# Patient Record
Sex: MALE | Race: WHITE | NOT HISPANIC OR LATINO | Employment: UNEMPLOYED | URBAN - METROPOLITAN AREA
[De-identification: names, ages, dates, MRNs, and addresses within clinical notes are randomized per-mention and may not be internally consistent; named-entity substitution may affect disease eponyms.]

---

## 2017-01-09 ENCOUNTER — HOSPITAL ENCOUNTER (EMERGENCY)
Facility: OTHER | Age: 52
Discharge: HOME | End: 2017-01-09
Attending: EMERGENCY MEDICINE
Payer: MEDICAID

## 2017-01-09 VITALS
TEMPERATURE: 98 F | OXYGEN SATURATION: 98 % | BODY MASS INDEX: 31.08 KG/M2 | SYSTOLIC BLOOD PRESSURE: 118 MMHG | RESPIRATION RATE: 18 BRPM | DIASTOLIC BLOOD PRESSURE: 62 MMHG | WEIGHT: 250 LBS | HEART RATE: 102 BPM | HEIGHT: 75 IN

## 2017-01-09 DIAGNOSIS — M54.50 ACUTE BILATERAL LOW BACK PAIN WITHOUT SCIATICA: Primary | ICD-10-CM

## 2017-01-09 PROCEDURE — 63600175 PHARM REV CODE 636 W HCPCS: Performed by: EMERGENCY MEDICINE

## 2017-01-09 PROCEDURE — 99283 EMERGENCY DEPT VISIT LOW MDM: CPT | Mod: 25

## 2017-01-09 PROCEDURE — 96372 THER/PROPH/DIAG INJ SC/IM: CPT

## 2017-01-09 RX ORDER — DEXAMETHASONE SODIUM PHOSPHATE 4 MG/ML
4 INJECTION, SOLUTION INTRA-ARTICULAR; INTRALESIONAL; INTRAMUSCULAR; INTRAVENOUS; SOFT TISSUE
Status: COMPLETED | OUTPATIENT
Start: 2017-01-09 | End: 2017-01-09

## 2017-01-09 RX ORDER — KETOROLAC TROMETHAMINE 30 MG/ML
60 INJECTION, SOLUTION INTRAMUSCULAR; INTRAVENOUS
Status: COMPLETED | OUTPATIENT
Start: 2017-01-09 | End: 2017-01-09

## 2017-01-09 RX ADMIN — KETOROLAC TROMETHAMINE 60 MG: 30 INJECTION, SOLUTION INTRAMUSCULAR at 10:01

## 2017-01-09 RX ADMIN — DEXAMETHASONE SODIUM PHOSPHATE 4 MG: 4 INJECTION, SOLUTION INTRAMUSCULAR; INTRAVENOUS at 10:01

## 2017-01-09 NOTE — ED AVS SNAPSHOT
[facility]  [address]  [facility] [address]               Yair Granger   2017 10:29 PM   ED    Description:  Male : 1965   Department:  [facility]           Your Care was Coordinated By:     Provider Role From To    Ofe Pat MD Attending Provider 17 [phone] --      Reason for Visit     Back Pain           Diagnoses this Visit        Comments    Acute bilateral low back pain without sciatica    -  Primary       ED Disposition     None           To Do List           Follow-up Information     Schedule an appointment as soon as possible for a visit with [facility].    Why:  As needed    Contact information:    [address] [facility] [address]  [phone]        [facility]     [facility] Care [facility] -  Assistance  Registered nurses in the [facility] provide clinical advisement, health education, appointment booking, and other advisory services.  Call for this free service at [phone].             Medications           Message regarding Medications     Verify the changes and/or additions to your medication regime listed below are the same as discussed with your clinician today.  If any of these changes or additions are incorrect, please notify your healthcare provider.        These medications were administered today        Dose Freq    dexamethasone injection 4 mg 4 mg ED 1 Time    Sig: Inject 1 mL (4 mg total) into the muscle ED 1 Time.    Class: Normal    Route: Intramuscular    ketorolac injection 60 mg 60 mg ED 1 Time    Sig: Inject 60 mg into the muscle ED 1 Time.    Class: Normal    Route: Intramuscular      STOP taking these medications     albuterol 90 mcg/actuation inhaler Inhale 1-2 puffs into the lungs every 6 (six) hours as needed for Wheezing (or cough).    hydrOXYzine HCl (ATARAX) 10 MG Tab Take 1 tablet (10 mg total) by mouth 4 (four) times daily  "as needed for Itching.           Verify that the below list of medications is an accurate representation of the medications you are currently taking.  If none reported, the list may be blank. If incorrect, please contact your healthcare provider. Carry this list with you in case of emergency.           Current Medications            Clinical Reference Information           Your Vitals Were     BP Pulse Temp Resp Height Weight    122/65 (BP Location: Left arm, Patient Position: Sitting) 117 98.3 °F (36.8 °C) (Oral) 18 6' 3" (1.905 m) 113.4 kg (250 lb)    SpO2 BMI             97% 31.25 kg/m2         Allergies as of 1/9/2017     No Known Allergies      Immunizations Administered on Date of Encounter - 1/9/2017     None      ED Micro, Lab, POCT     None      ED Imaging Orders     None        Discharge Instructions         Back Pain (Acute or Chronic)    Back pain is one of the most common problems. The good news is that most people feel better in 1 to 2 weeks, and most of the rest in 1 to 2 months. Most people can remain active.  People experience and describe pain differently; not everyone is the same.  · The pain can be sharp, stabbing, shooting, aching, cramping or burning.  · Movement, standing, bending, lifting, sitting, or walking may worsen pain.  · It can be localized to one spot or area, or it can be more generalized.  · It can spread or radiate upwards, to the front, or go down your arms or legs (sciatica).  · It can cause muscle spasm.  Most of the time, mechanical problems with the muscles or spine cause the pain. Mechanical problems are usually caused by an injury to the muscles or ligaments. While illness can cause back pain, it is usually not caused by a serious illness. Mechanical problems include:   · Physical activity such as sports, exercise, work, or normal activity  · Overexertion, lifting, pushing, pulling incorrectly or too aggressively  · Sudden twisting, bending, or stretching from an accident, " or accidental movement  · Poor posture  · Stretching or moving wrong, without noticing pain at the time  · Poor coordination, lack of regular exercise (check with your doctor about this)  · Spinal disc disease or arthritis  · Stress  Pain can also be related to pregnancy, or illness like appendicitis, bladder or kidney infections, pelvic infections, and many other things.  Acute back pain usually gets better in 1 to 2 weeks. Back pain related to disk disease, arthritis in the spinal joints or spinal stenosis (narrowing of the spinal canal) can become chronic and last for months or years.  Unless you had a physical injury (for example, a car accident or fall) X-rays are usually not needed for the initial evaluation of back pain. If pain continues and does not respond to medical treatment, X-rays and other tests may be needed.  Home care  Try these home care recommendations:  · When in bed, try to find a position of comfort. A firm mattress is best. Try lying flat on your back with pillows under your knees. You can also try lying on your side with your knees bent up towards your chest and a pillow between your knees.  · At first, do not try to stretch out the sore spots. If there is a strain, it is not like the good soreness you get after exercising without an injury. In this case, stretching may make it worse.  · Avoid prolong sitting, long car rides, or travel. This puts more stress on the lower back than standing or walking.  · During the first 24 to 72 hours after an acute injury or flare up of chronic back pain, apply an ice pack to the painful area for 20 minutes and then remove it for 20 minutes. Do this over a period of 60 to 90 minutes or several times a day. This will reduce swelling and pain. Wrap the ice pack in a thin towel or plastic to protect your skin.  · You can start with ice, then switch to heat. Heat (hot shower, hot bath, or heating pad) reduces pain and works well for muscle spasms. Heat can be  applied to the painful area for 20 minutes then remove it for 20 minutes. Do this over a period of 60 to 90 minutes or several times a day. Do not sleep on a heating pad. It can lead to skin burns or tissue damage.  · You can alternate ice and heat therapy. Talk with your doctor about the best treatment for your back pain.  · Therapeutic massage can help relax the back muscles without stretching them.  · Be aware of safe lifting methods and do not lift anything without stretching first.  Medicines  Talk to your doctor before using medicine, especially if you have other medical problems or are taking other medicines.  · You may use over-the-counter medicine as directed on the bottle to control pain, unless another pain medicine was prescribed. If you have chronic conditions like diabetes, liver or kidney disease, stomach ulcers, or gastrointestinal bleeding, or are taking blood thinners, talk to your doctor before taking any medicine.  · Be careful if you are given a prescription medicines, narcotics, or medicine for muscle spasms. They can cause drowsiness, affect your coordination, reflexes, and judgement. Do not drive or operate heavy machinery.  Follow-up care  Follow up with your healthcare provider, or as advised.   A radiologist will review any X-rays that were taken. Your provide will notify you of any new findings that may affect your care.  Call 911  Call emergency services if any of the following occur:  · Trouble breathing  · Confusion  · Very drowsy or trouble awakening  · Fainting or loss of consciousness  · Rapid or very slow heart rate  · Loss of bowel or bladder control  When to seek medical advice  Call your healthcare provider right away if any of these occur:   · Pain becomes worse or spreads to your legs  · Weakness or numbness in one or both legs  · Numbness in the groin or genital area  © 8913-6231 [employer] [address] [place] [address], [place] [address]. All rights reserved. This  information is not intended as a substitute for professional medical care. Always follow your healthcare professional's instructions.          [facility] Sign-Up     Activating your [facility] account is as easy as 1-2-3!     1) Visit [email or web address], select Sign Up Now, enter this activation code and your date of birth, then select Next.  [email or web address]  Expires: 1/28/2017  2:15 PM      2) Create a username and password to use when you visit [facility] in the future and select a security question in case you lose your password and select Next.    3) Enter your e-mail address and click Sign Up!    Additional Information  If you have questions, please e-mail [email or web address] or call [phone] to talk to our [facility] staff. Remember, [facility] is NOT to be used for urgent needs. For medical emergencies, dial 911.         Smoking Cessation     If you would like to quit smoking:   You may be eligible for free services if you are a [place] resident and started smoking cigarettes before September 1, 1988.  Call the Smoking Cessation Trust (SCT) toll free at (305) 916-3610 or (782) 978-1412.   Call -182-QUIT-NOW if you do not meet the above criteria.             [facility] complies with applicable Federal civil rights laws and does not discriminate on the basis of race, color, national origin, age, disability, or sex.        Language Assistance Services     ATTENTION: Language assistance services are available, free of charge. Please call [phone].      ATENCIÓN: Si habla español, tiene a larsen disposición servicios gratuitos de asistencia lingüística. Llame al [phone].     CHÚ Ý: N?u b?n nói Ti?ng Vi?t, có các d?ch v? h? tr? ngôn ng? mi?n phí dành cho b?n. G?i s? [phone].

## 2017-01-10 ENCOUNTER — HOSPITAL ENCOUNTER (EMERGENCY)
Facility: OTHER | Age: 52
Discharge: HOME | End: 2017-01-10
Attending: EMERGENCY MEDICINE
Payer: MEDICAID

## 2017-01-10 VITALS
HEART RATE: 87 BPM | BODY MASS INDEX: 34.35 KG/M2 | TEMPERATURE: 98 F | RESPIRATION RATE: 18 BRPM | DIASTOLIC BLOOD PRESSURE: 84 MMHG | OXYGEN SATURATION: 95 % | SYSTOLIC BLOOD PRESSURE: 129 MMHG | HEIGHT: 75 IN | WEIGHT: 276.25 LBS

## 2017-01-10 DIAGNOSIS — E86.0 MILD DEHYDRATION: Primary | ICD-10-CM

## 2017-01-10 DIAGNOSIS — R41.3 MEMORY LOSS: ICD-10-CM

## 2017-01-10 LAB
ALBUMIN SERPL BCP-MCNC: 3.3 G/DL
ALP SERPL-CCNC: 107 U/L
ALT SERPL W/O P-5'-P-CCNC: 15 U/L
AMMONIA PLAS-SCNC: 24 UMOL/L
ANION GAP SERPL CALC-SCNC: 12 MMOL/L
AST SERPL-CCNC: 11 U/L
BASOPHILS # BLD AUTO: 0.06 K/UL
BASOPHILS NFR BLD: 0.5 %
BILIRUB SERPL-MCNC: 0.7 MG/DL
BUN SERPL-MCNC: 23 MG/DL
CALCIUM SERPL-MCNC: 9.3 MG/DL
CHLORIDE SERPL-SCNC: 110 MMOL/L
CO2 SERPL-SCNC: 21 MMOL/L
CREAT SERPL-MCNC: 1.1 MG/DL
CRP SERPL-MCNC: 12.4 MG/L
DIFFERENTIAL METHOD: ABNORMAL
EOSINOPHIL # BLD AUTO: 0.3 K/UL
EOSINOPHIL NFR BLD: 2 %
ERYTHROCYTE [DISTWIDTH] IN BLOOD BY AUTOMATED COUNT: 13.3 %
ERYTHROCYTE [SEDIMENTATION RATE] IN BLOOD BY WESTERGREN METHOD: 27 MM/HR
EST. GFR  (AFRICAN AMERICAN): >60 ML/MIN/1.73 M^2
EST. GFR  (NON AFRICAN AMERICAN): >60 ML/MIN/1.73 M^2
ETHANOL SERPL-MCNC: <10 MG/DL
GLUCOSE SERPL-MCNC: 137 MG/DL
HCT VFR BLD AUTO: 42.9 %
HGB BLD-MCNC: 14.3 G/DL
HIV1+2 IGG SERPL QL IA.RAPID: NEGATIVE
LYMPHOCYTES # BLD AUTO: 2.3 K/UL
LYMPHOCYTES NFR BLD: 17.2 %
MAGNESIUM SERPL-MCNC: 2.3 MG/DL
MCH RBC QN AUTO: 33 PG
MCHC RBC AUTO-ENTMCNC: 33.3 %
MCV RBC AUTO: 99 FL
MONOCYTES # BLD AUTO: 1.2 K/UL
MONOCYTES NFR BLD: 8.7 %
NEUTROPHILS # BLD AUTO: 9.4 K/UL
NEUTROPHILS NFR BLD: 71.3 %
PLATELET # BLD AUTO: 176 K/UL
PMV BLD AUTO: 12 FL
POTASSIUM SERPL-SCNC: 3.9 MMOL/L
PROT SERPL-MCNC: 6.6 G/DL
RBC # BLD AUTO: 4.33 M/UL
SODIUM SERPL-SCNC: 143 MMOL/L
WBC # BLD AUTO: 13.2 K/UL

## 2017-01-10 PROCEDURE — 82140 ASSAY OF AMMONIA: CPT

## 2017-01-10 PROCEDURE — 99284 EMERGENCY DEPT VISIT MOD MDM: CPT

## 2017-01-10 PROCEDURE — 85025 COMPLETE CBC W/AUTO DIFF WBC: CPT

## 2017-01-10 PROCEDURE — 86140 C-REACTIVE PROTEIN: CPT

## 2017-01-10 PROCEDURE — 80053 COMPREHEN METABOLIC PANEL: CPT

## 2017-01-10 PROCEDURE — 80320 DRUG SCREEN QUANTALCOHOLS: CPT

## 2017-01-10 PROCEDURE — 85651 RBC SED RATE NONAUTOMATED: CPT

## 2017-01-10 PROCEDURE — 83735 ASSAY OF MAGNESIUM: CPT

## 2017-01-10 PROCEDURE — 86701 HIV-1ANTIBODY: CPT

## 2017-01-10 NOTE — ED AVS SNAPSHOT
"          [facility]  [address]  [facility] [address]               Yair Granger   1/10/2017  8:06 PM   ED    Description:  Male : 1965   Department:  [facility]           Your Care was Coordinated By:     Provider Role From To    Joceline Johnson MD Attending Provider 01/10/17 2011 --      Reason for Visit     Memory Loss           Diagnoses this Visit        Comments    Mild dehydration    -  Primary     Memory loss           ED Disposition     None           To Do List           Follow-up Information     Follow up with [facility].    Why:  in 2-3 days    Contact information:    [address] [facility] [address]  [phone]        [facility]     [facility] -  Assistance  Registered nurses in the [facility] provide clinical advisement, health education, appointment booking, and other advisory services.  Call for this free service at [phone].             Medications           Message regarding Medications     Verify the changes and/or additions to your medication regime listed below are the same as discussed with your clinician today.  If any of these changes or additions are incorrect, please notify your healthcare provider.             Verify that the below list of medications is an accurate representation of the medications you are currently taking.  If none reported, the list may be blank. If incorrect, please contact your healthcare provider. Carry this list with you in case of emergency.           Current Medications            Clinical Reference Information           Your Vitals Were     BP Pulse Temp Resp Height Weight    118/68 (BP Location: Left arm, Patient Position: Sitting) 95 98.3 °F (36.8 °C) (Oral) 22 6' 3" (1.905 m) 125.3 kg (276 lb 3.8 oz)    SpO2 BMI             97% 34.53 kg/m2         Allergies as of 1/10/2017     No Known Allergies    "   Immunizations Administered on Date of Encounter - 1/10/2017     None      ED Micro, Lab, POCT     Start Ordered       Status Ordering Provider    01/10/17 2151 01/10/17 2150  TSH  Add-on      Completed     01/10/17 2046 01/10/17 2046  Rapid HIV  STAT      Final result     01/10/17 2046 01/10/17 2046  C-reactive protein  STAT      Final result     01/10/17 2046 01/10/17 2046  Sedimentation rate, manual  STAT      In process     01/10/17 2046 01/10/17 2046  Magnesium  STAT      Final result     01/10/17 2046 01/10/17 2046  CBC auto differential  STAT      Final result     01/10/17 2046 01/10/17 2046  Comprehensive metabolic panel  STAT      Final result     01/10/17 2045 01/10/17 2046  Ethanol  STAT      Final result     01/10/17 2045 01/10/17 2046  Ammonia  STAT      Final result       ED Imaging Orders     Start Ordered       Status Ordering Provider    01/10/17 2046 01/10/17 2046  CT Head Without Contrast  1 time imaging      Final result       Discharge References/Attachments     DEHYDRATION (ADULT) (ENGLISH)    CONFUSION (ENGLISH)      [facility] Sign-Up     Activating your [facility] account is as easy as 1-2-3!     1) Visit [email or web address], select Sign Up Now, enter this activation code and your date of birth, then select Next.  [email or web address]  Expires: 1/28/2017  2:15 PM      2) Create a username and password to use when you visit [facility] in the future and select a security question in case you lose your password and select Next.    3) Enter your e-mail address and click Sign Up!    Additional Information  If you have questions, please e-mail [email or web address] or call [phone] to talk to our [facility] staff. Remember, [facility] is NOT to be used for urgent needs. For medical emergencies, dial 911.         Smoking Cessation     If you would like to quit smoking:   You may be eligible for free services if you are a [place] resident and started smoking cigarettes before September 1, 1988.  Call the  Smoking Cessation Trust ([facility]) toll free at (755) 558-7653 or (645) 561-2348.   Call 1-800-QUIT-NOW if you do not meet the above criteria.             [facility] complies with applicable Federal civil rights laws and does not discriminate on the basis of race, color, national origin, age, disability, or sex.        Language Assistance Services     ATTENTION: Language assistance services are available, free of charge. Please call [phone].      ATENCIÓN: Si habla maribel, tiene a larsen disposición servicios gratuitos de asistencia lingüística. Llame al [phone].     ARNULFO Ý: N?u b?n nói Ti?ng Vi?t, có các d?ch v? h? tr? ngôn ng? mi?n phí dành cho b?n. G?i s? [phone].

## 2017-01-10 NOTE — DISCHARGE INSTRUCTIONS
Back Pain (Acute or Chronic)    Back pain is one of the most common problems. The good news is that most people feel better in 1 to 2 weeks, and most of the rest in 1 to 2 months. Most people can remain active.  People experience and describe pain differently; not everyone is the same.  · The pain can be sharp, stabbing, shooting, aching, cramping or burning.  · Movement, standing, bending, lifting, sitting, or walking may worsen pain.  · It can be localized to one spot or area, or it can be more generalized.  · It can spread or radiate upwards, to the front, or go down your arms or legs (sciatica).  · It can cause muscle spasm.  Most of the time, mechanical problems with the muscles or spine cause the pain. Mechanical problems are usually caused by an injury to the muscles or ligaments. While illness can cause back pain, it is usually not caused by a serious illness. Mechanical problems include:   · Physical activity such as sports, exercise, work, or normal activity  · Overexertion, lifting, pushing, pulling incorrectly or too aggressively  · Sudden twisting, bending, or stretching from an accident, or accidental movement  · Poor posture  · Stretching or moving wrong, without noticing pain at the time  · Poor coordination, lack of regular exercise (check with your doctor about this)  · Spinal disc disease or arthritis  · Stress  Pain can also be related to pregnancy, or illness like appendicitis, bladder or kidney infections, pelvic infections, and many other things.  Acute back pain usually gets better in 1 to 2 weeks. Back pain related to disk disease, arthritis in the spinal joints or spinal stenosis (narrowing of the spinal canal) can become chronic and last for months or years.  Unless you had a physical injury (for example, a car accident or fall) X-rays are usually not needed for the initial evaluation of back pain. If pain continues and does not respond to medical treatment, X-rays and other tests may be  needed.  Home care  Try these home care recommendations:  · When in bed, try to find a position of comfort. A firm mattress is best. Try lying flat on your back with pillows under your knees. You can also try lying on your side with your knees bent up towards your chest and a pillow between your knees.  · At first, do not try to stretch out the sore spots. If there is a strain, it is not like the good soreness you get after exercising without an injury. In this case, stretching may make it worse.  · Avoid prolong sitting, long car rides, or travel. This puts more stress on the lower back than standing or walking.  · During the first 24 to 72 hours after an acute injury or flare up of chronic back pain, apply an ice pack to the painful area for 20 minutes and then remove it for 20 minutes. Do this over a period of 60 to 90 minutes or several times a day. This will reduce swelling and pain. Wrap the ice pack in a thin towel or plastic to protect your skin.  · You can start with ice, then switch to heat. Heat (hot shower, hot bath, or heating pad) reduces pain and works well for muscle spasms. Heat can be applied to the painful area for 20 minutes then remove it for 20 minutes. Do this over a period of 60 to 90 minutes or several times a day. Do not sleep on a heating pad. It can lead to skin burns or tissue damage.  · You can alternate ice and heat therapy. Talk with your doctor about the best treatment for your back pain.  · Therapeutic massage can help relax the back muscles without stretching them.  · Be aware of safe lifting methods and do not lift anything without stretching first.  Medicines  Talk to your doctor before using medicine, especially if you have other medical problems or are taking other medicines.  · You may use over-the-counter medicine as directed on the bottle to control pain, unless another pain medicine was prescribed. If you have chronic conditions like diabetes, liver or kidney disease,  stomach ulcers, or gastrointestinal bleeding, or are taking blood thinners, talk to your doctor before taking any medicine.  · Be careful if you are given a prescription medicines, narcotics, or medicine for muscle spasms. They can cause drowsiness, affect your coordination, reflexes, and judgement. Do not drive or operate heavy machinery.  Follow-up care  Follow up with your healthcare provider, or as advised.   A radiologist will review any X-rays that were taken. Your provide will notify you of any new findings that may affect your care.  Call 911  Call emergency services if any of the following occur:  · Trouble breathing  · Confusion  · Very drowsy or trouble awakening  · Fainting or loss of consciousness  · Rapid or very slow heart rate  · Loss of bowel or bladder control  When to seek medical advice  Call your healthcare provider right away if any of these occur:   · Pain becomes worse or spreads to your legs  · Weakness or numbness in one or both legs  · Numbness in the groin or genital area  © 1873-7757 [employer]. [address] [place] [address], [place] [address]. All rights reserved. This information is not intended as a substitute for professional medical care. Always follow your healthcare professional's instructions.

## 2017-01-10 NOTE — ED PROVIDER NOTES
Encounter Date: 1/9/2017    SCRIBE #1 NOTE: I, Trena Lockett , am scribing for, and in the presence of, Dr. Pat.       History     Chief Complaint   Patient presents with    Back Pain     X a few hours, denies trauma     Review of patient's allergies indicates:  No Known Allergies  HPI Comments: Time seen by provider: 10:32 PM    This is a 51 y.o. male who presents with complaint of back pain. Symptoms began today. Onset of symptoms occurred while walking. Bilateral lower back pain is described as constant and moderate. Pt has no other complaints, and denies fever, chills, nausea, vomiting, incontinence, or any urinary symptoms. Pain worsens with movement, and is better with rest. Pt denies taking any OTC medication, recent injury, or heavy lifting. Episode is inconsistent with previous back pain.        The history is provided by the patient.     History reviewed. No pertinent past medical history.  Past Medical History Pertinent Negatives   Diagnosis Date Noted    Diabetes mellitus 12/14/2016    Hypertension 12/14/2016     Past Surgical History   Procedure Laterality Date    None N/A 6/5/2015     Family History   Problem Relation Age of Onset    Cancer Neg Hx     Diabetes Neg Hx     Heart disease Neg Hx     Hypertension Neg Hx     Stroke Neg Hx      Social History   Substance Use Topics    Smoking status: Current Every Day Smoker     Packs/day: 2.00     Types: Cigarettes    Smokeless tobacco: None    Alcohol use No     Review of Systems   Constitutional: Negative for chills and fever.   HENT: Negative for congestion and sore throat.    Eyes: Negative for redness and visual disturbance.   Respiratory: Negative for cough and shortness of breath.    Cardiovascular: Negative for chest pain and palpitations.   Gastrointestinal: Negative for abdominal pain, diarrhea, nausea and vomiting.   Genitourinary: Negative for decreased urine volume, difficulty urinating, dysuria, frequency, hematuria and urgency.         Positive for inconitinence.    Musculoskeletal: Positive for back pain.   Skin: Negative for rash.   Neurological: Negative for weakness and headaches.   Psychiatric/Behavioral: Negative for confusion.       Physical Exam   Initial Vitals   BP Pulse Resp Temp SpO2   01/09/17 2113 01/09/17 2113 01/09/17 2113 01/09/17 2113 01/09/17 2113   122/65 117 18 98.3 °F (36.8 °C) 97 %     Physical Exam    Nursing note and vitals reviewed.  Constitutional: He appears well-developed and well-nourished. He is not diaphoretic. No distress.   Dirty and disheveled.    HENT:   Head: Normocephalic and atraumatic.   Right Ear: External ear normal.   Left Ear: External ear normal.   Eyes: EOM are normal. Pupils are equal, round, and reactive to light. Right eye exhibits no discharge. Left eye exhibits no discharge.   Neck: Normal range of motion.   Cardiovascular: Normal rate, regular rhythm and normal heart sounds. Exam reveals no gallop and no friction rub.    No murmur heard.  Pulmonary/Chest: Breath sounds normal. No respiratory distress. He has no wheezes. He has no rhonchi. He has no rales.   Abdominal: Soft. There is no tenderness. There is no rebound and no guarding.   Musculoskeletal: Normal range of motion. He exhibits tenderness. He exhibits no edema.   Paraspinal tenderness to palpation to the lumbar region bilaterally. Tenderness over the SI joint. No focal midline tenderness to palpation.    Lymphadenopathy:     He has no cervical adenopathy.   Neurological: He is alert and oriented to person, place, and time.   Strength is 5/5 throughout.    Skin: Skin is warm and dry. No rash and no abscess noted. No erythema. No pallor.   Psychiatric: He has a normal mood and affect. His behavior is normal. Judgment and thought content normal.         ED Course   Procedures  Labs Reviewed - No data to display          Medical Decision Making:   History:   Old Medical Records: I decided to obtain old medical records.  Initial  Assessment:   51-year-old male presents complaining of atraumatic bilateral low back pain which started while walking.  He denied any symptoms concerning for cord compression.  On exam he had diffuse lumbar paraspinal tenderness to palpation bilaterally.  ED Management:  Do not feel any imaging was indicated at this time.  He received Toradol and Decadron 4 mg.  The patient was instructed to follow-up with his primary care doctor if the pain persists.            Scribe Attestation:   Scribe #1: I performed the above scribed service and the documentation accurately describes the services I performed. I attest to the accuracy of the note.    Attending Attestation:           Physician Attestation for Scribe:  Physician Attestation Statement for Scribe #1: I, Dr. Pat, reviewed documentation, as scribed by Trena Lockett  in my presence, and it is both accurate and complete.                 ED Course     Clinical Impression:     1. Acute bilateral low back pain without sciatica                Ofe Pat MD  01/10/17 0138

## 2017-01-11 NOTE — ED PROVIDER NOTES
"Encounter Date: 1/10/2017    SCRIBE #1 NOTE: I, James Rendon, am scribing for, and in the presence of, Dr. Johnson.       History     Chief Complaint   Patient presents with    Memory Loss     today, no trauma, doesn't remember what happened today     Review of patient's allergies indicates:  No Known Allergies  HPI Comments: Time seen by provider: 8:31 PM    This is a 51 y.o. male who presents to the ED complaining he has had difficulty with his memory, stating "its all a blur." He complains he cannot remember events from yesterday. He states that he has not been in the hospital within the last week. He reports that he cannot remember the last time he's seen a physician. The patient's hx is limited.     Additional past medical, surgical, and social history as outlined in the nursing assessment was reviewed by me.    The history is provided by the patient. History limited by: lack of cooperativity.     History reviewed. No pertinent past medical history.  No past medical history pertinent negatives.  Past Surgical History   Procedure Laterality Date    None N/A 6/5/2015     Family History   Problem Relation Age of Onset    Cancer Neg Hx     Diabetes Neg Hx     Heart disease Neg Hx     Hypertension Neg Hx     Stroke Neg Hx      Social History   Substance Use Topics    Smoking status: Current Every Day Smoker     Packs/day: 2.00     Types: Cigarettes    Smokeless tobacco: None    Alcohol use No     Review of Systems   Unable to perform ROS: Other (patient will not answer question)       Physical Exam   Initial Vitals   BP Pulse Resp Temp SpO2   01/10/17 1937 01/10/17 1937 01/10/17 1937 01/10/17 1937 01/10/17 1937   118/68 94 22 98.3 °F (36.8 °C) 97 %     Physical Exam    Nursing note and vitals reviewed.  Constitutional: He appears well-developed and well-nourished. He is not diaphoretic. No distress.   HENT:   Head: Normocephalic and atraumatic.   Right Ear: External ear normal.   Left Ear: External ear " normal.   Nose: Nose normal.   Mouth/Throat: Oropharynx is clear and moist.   Eyes: Conjunctivae and EOM are normal. Pupils are equal, round, and reactive to light. Right eye exhibits no discharge. Left eye exhibits no discharge.   Neck: Normal range of motion. Tracheal deviation present.   Cardiovascular: Normal rate, regular rhythm, normal heart sounds and intact distal pulses. Exam reveals no gallop and no friction rub.    No murmur heard.  Pulmonary/Chest: Breath sounds normal. No stridor. No respiratory distress. He has no wheezes. He has no rhonchi. He has no rales.   Abdominal: Soft. There is no tenderness. There is no rebound and no guarding.   Musculoskeletal: Normal range of motion. He exhibits no edema or tenderness.   Neurological: He is alert. He has normal strength. No cranial nerve deficit.   Oriented to person and place. Unwilling to try and answer what year it is. Normal gait.    Skin: Skin is warm and dry. No rash noted. No erythema. No pallor.   Psychiatric: He has a normal mood and affect. His behavior is normal. Judgment and thought content normal.   Pressured speech.          ED Course   Procedures  Labs Reviewed   C-REACTIVE PROTEIN - Abnormal; Notable for the following:        Result Value    CRP 12.4 (*)     All other components within normal limits   SEDIMENTATION RATE, MANUAL - Abnormal; Notable for the following:     Sed Rate 27 (*)     All other components within normal limits   CBC W/ AUTO DIFFERENTIAL - Abnormal; Notable for the following:     WBC 13.20 (*)     RBC 4.33 (*)     MCV 99 (*)     MCH 33.0 (*)     Gran # 9.4 (*)     Mono # 1.2 (*)     Lymph% 17.2 (*)     All other components within normal limits   COMPREHENSIVE METABOLIC PANEL - Abnormal; Notable for the following:     CO2 21 (*)     Glucose 137 (*)     BUN, Bld 23 (*)     Albumin 3.3 (*)     All other components within normal limits   ALCOHOL,MEDICAL (ETHANOL)   AMMONIA   RAPID HIV   MAGNESIUM   TSH              Imaging  "Results         CT Head Without Contrast (Final result) Result time:  01/10/17 21:06:19    Final result by Fortunato Sanchez MD (01/10/17 21:06:19)    Impression:        No acute intracranial abnormalities, stable chronic findings.            Electronically signed by: FORTUNATO SANCHEZ MD  Date:     01/10/17  Time:    21:06     Narrative:    Comparison: 2/28/2016    Clinical history: Memory loss    Technique:    Axial images of the brain were obtained at 5-mm intervals from the skull base to the vertex without the administration of contrast.    Findings:    Prominent choroidal calcifications again noted.  There is no evidence of acute major vascular territory infarct, hemorrhage, or mass.  There is no hydrocephalus.  There are no abnormal extra-axial fluid collections.  The paranasal sinuses and mastoid air cells are clear, and there is no evidence of calvarial fracture.  The visualized soft tissues are unremarkable.              Medical Decision Making:   Initial Assessment:   Patient presents with memory loss. He is oriented to person and place in the ED. He is minimally cooperative with my hx and exam. When I asked him questions he would not respond and would watch the television. When the television was turned off he became very upset and yelled at the medical team stating "don't touch that, keep your hands to yourself. You've made me mad." His ability to focus his attention and mount such a heightened response leave to suspect that his poor cooperativity is less likely due to delirium or an organic cause for his current complaints.  I will CT his head to look for signs of increased intracranial pressure or space occupying lesion. I will check renal function, ammonia, ethanol level and look for signs of infection. He is following commands and has no evidence of delirium at this time. I will reassess.   ED Management:  Patient labs were unremarkable except a minimally elevated ESR/CRP. I felt as though this " elevation was within the normal standard deviation and I did not believe it represented occult bacteremia or serious bacterial infection. Patient ambulated with ease. I was comfortable with his discharge home.             Scribe Attestation:   Scribe #1: I performed the above scribed service and the documentation accurately describes the services I performed. I attest to the accuracy of the note.    Attending Attestation:           Physician Attestation for Scribe:  Physician Attestation Statement for Scribe #1: I, Dr. Johnson, reviewed documentation, as scribed by James Rendon in my presence, and it is both accurate and complete.                 ED Course     Clinical Impression:     1. Mild dehydration    2. Memory loss                Joceline Johnson MD  01/14/17 4164

## 2017-01-13 ENCOUNTER — HOSPITAL ENCOUNTER (EMERGENCY)
Facility: OTHER | Age: 52
Discharge: HOME | End: 2017-01-13
Attending: EMERGENCY MEDICINE
Payer: MEDICAID

## 2017-01-13 VITALS
HEART RATE: 95 BPM | RESPIRATION RATE: 20 BRPM | WEIGHT: 250 LBS | OXYGEN SATURATION: 96 % | DIASTOLIC BLOOD PRESSURE: 88 MMHG | HEIGHT: 75 IN | TEMPERATURE: 98 F | SYSTOLIC BLOOD PRESSURE: 131 MMHG | BODY MASS INDEX: 31.08 KG/M2

## 2017-01-13 DIAGNOSIS — M54.50 ACUTE RIGHT-SIDED LOW BACK PAIN WITHOUT SCIATICA: Primary | ICD-10-CM

## 2017-01-13 DIAGNOSIS — R21 RASH: ICD-10-CM

## 2017-01-13 PROCEDURE — 63600175 PHARM REV CODE 636 W HCPCS: Performed by: EMERGENCY MEDICINE

## 2017-01-13 PROCEDURE — 96372 THER/PROPH/DIAG INJ SC/IM: CPT

## 2017-01-13 PROCEDURE — 25000003 PHARM REV CODE 250: Performed by: EMERGENCY MEDICINE

## 2017-01-13 PROCEDURE — 99283 EMERGENCY DEPT VISIT LOW MDM: CPT | Mod: 25

## 2017-01-13 RX ORDER — ORPHENADRINE CITRATE 30 MG/ML
60 INJECTION INTRAMUSCULAR; INTRAVENOUS
Status: COMPLETED | OUTPATIENT
Start: 2017-01-13 | End: 2017-01-13

## 2017-01-13 RX ORDER — DIPHENHYDRAMINE HCL 25 MG
25 CAPSULE ORAL EVERY 6 HOURS PRN
Qty: 20 CAPSULE | Refills: 0 | COMMUNITY
Start: 2017-01-13

## 2017-01-13 RX ORDER — ORPHENADRINE CITRATE 100 MG/1
100 TABLET, EXTENDED RELEASE ORAL 2 TIMES DAILY
Qty: 14 TABLET | Refills: 0 | Status: SHIPPED | OUTPATIENT
Start: 2017-01-13 | End: 2017-01-20

## 2017-01-13 RX ORDER — DIPHENHYDRAMINE HCL 25 MG
50 CAPSULE ORAL
Status: COMPLETED | OUTPATIENT
Start: 2017-01-13 | End: 2017-01-13

## 2017-01-13 RX ORDER — KETOROLAC TROMETHAMINE 30 MG/ML
60 INJECTION, SOLUTION INTRAMUSCULAR; INTRAVENOUS
Status: COMPLETED | OUTPATIENT
Start: 2017-01-13 | End: 2017-01-13

## 2017-01-13 RX ORDER — IBUPROFEN 600 MG/1
600 TABLET ORAL EVERY 6 HOURS PRN
Qty: 20 TABLET | Refills: 0 | Status: SHIPPED | OUTPATIENT
Start: 2017-01-13

## 2017-01-13 RX ADMIN — DIPHENHYDRAMINE HYDROCHLORIDE 50 MG: 25 CAPSULE ORAL at 10:01

## 2017-01-13 RX ADMIN — KETOROLAC TROMETHAMINE 60 MG: 30 INJECTION, SOLUTION INTRAMUSCULAR at 10:01

## 2017-01-13 RX ADMIN — ORPHENADRINE CITRATE 60 MG: 30 INJECTION INTRAMUSCULAR; INTRAVENOUS at 10:01

## 2017-01-13 NOTE — ED AVS SNAPSHOT
[facility]  [address]  [facility] [address]               Yair Granger   2017  9:34 PM   ED    Description:  Male : 1965   Department:  [facility]           Your Care was Coordinated By:     Provider Role From To    Del Centeno DO Attending Provider [id number] --      Reason for Visit     Back Pain           Diagnoses this Visit        Comments    Acute right-sided low back pain without sciatica    -  Primary     Rash           ED Disposition     None           To Do List           Follow-up Information     Follow up with [facility] In 1 week.    Contact information:    [address] [facility] [address]  [phone]         These Medications        Disp Refills Start End    ibuprofen (ADVIL,MOTRIN) 600 MG tablet 20 tablet 0 2017     Take 1 tablet (600 mg total) by mouth every 6 (six) hours as needed for Pain. - Oral    orphenadrine (NORFLEX) 100 mg tablet 14 tablet 0 2017    Take 1 tablet (100 mg total) by mouth 2 (two) times daily. - Oral      PURCHASE these Medications (No prescription required)        Start End    diphenhydrAMINE (BENADRYL) 25 mg capsule 2017     Sig - Route: Take 1 each (25 mg total) by mouth every 6 (six) hours as needed for Itching or Allergies. - Oral    Class: OTC      [facility] On Call     [facility] On Call Nurse Care Line -  Assistance  Registered nurses in the [facility] On Call Center provide clinical advisement, health education, appointment booking, and other advisory services.  Call for this free service at [phone].             Medications           Message regarding Medications     Verify the changes and/or additions to your medication regime listed below are the same as discussed with your clinician today.  If any of these changes or additions are incorrect, please notify your healthcare provider.        START taking  these NEW medications        Refills    ibuprofen (ADVIL,MOTRIN) 600 MG tablet 0    Sig: Take 1 tablet (600 mg total) by mouth every 6 (six) hours as needed for Pain.    Class: Print    Route: Oral    diphenhydrAMINE (BENADRYL) 25 mg capsule 0    Sig: Take 1 each (25 mg total) by mouth every 6 (six) hours as needed for Itching or Allergies.    Class: OTC    Route: Oral    orphenadrine (NORFLEX) 100 mg tablet 0    Sig: Take 1 tablet (100 mg total) by mouth 2 (two) times daily.    Class: Print    Route: Oral      These medications were administered today        Dose Freq    diphenhydrAMINE capsule 50 mg 50 mg ED 1 Time    Sig: Take 2 each (50 mg total) by mouth ED 1 Time.    Class: Normal    Route: Oral    ketorolac injection 60 mg 60 mg ED 1 Time    Sig: Inject 60 mg into the muscle ED 1 Time.    Class: Normal    Route: Intramuscular    orphenadrine injection 60 mg 60 mg ED 1 Time    Sig: Inject 2 mLs (60 mg total) into the muscle ED 1 Time.    Class: Normal    Route: Intramuscular           Verify that the below list of medications is an accurate representation of the medications you are currently taking.  If none reported, the list may be blank. If incorrect, please contact your healthcare provider. Carry this list with you in case of emergency.           Current Medications     diphenhydrAMINE (BENADRYL) 25 mg capsule Take 1 each (25 mg total) by mouth every 6 (six) hours as needed for Itching or Allergies.    diphenhydrAMINE capsule 50 mg Take 2 each (50 mg total) by mouth ED 1 Time.    ibuprofen (ADVIL,MOTRIN) 600 MG tablet Take 1 tablet (600 mg total) by mouth every 6 (six) hours as needed for Pain.    ketorolac injection 60 mg Inject 60 mg into the muscle ED 1 Time.    orphenadrine (NORFLEX) 100 mg tablet Take 1 tablet (100 mg total) by mouth 2 (two) times daily.    orphenadrine injection 60 mg Inject 2 mLs (60 mg total) into the muscle ED 1 Time.           Clinical Reference Information           Your Vitals  "Were     BP Pulse Temp Resp Height Weight    134/74 (BP Location: Left arm, Patient Position: Sitting) 93 98 °F (36.7 °C) (Oral) 18 6' 3" (1.905 m) 113.4 kg (250 lb)    SpO2 BMI             96% 31.25 kg/m2         Allergies as of 1/13/2017     No Known Allergies      Immunizations Administered on Date of Encounter - 1/13/2017     None      ED Micro, Lab, POCT     None      ED Imaging Orders     None      Discharge References/Attachments     BACK PAIN (ACUTE OR CHRONIC) (ENGLISH)      [facility] Sign-Up     Activating your [facility] account is as easy as 1-2-3!     1) Visit [email or web address], select Sign Up Now, enter this activation code and your date of birth, then select Next.  [email or web address]  Expires: 1/28/2017  2:15 PM      2) Create a username and password to use when you visit [facility] in the future and select a security question in case you lose your password and select Next.    3) Enter your e-mail address and click Sign Up!    Additional Information  If you have questions, please e-mail [email or web address] or call [phone] to talk to our [facility] staff. Remember, [facility] is NOT to be used for urgent needs. For medical emergencies, dial 911.         Smoking Cessation     If you would like to quit smoking:   You may be eligible for free services if you are a [place] resident and started smoking cigarettes before September 1, 1988.  Call the Smoking Cessation Trust ([facility]) toll free at [phone]) [phone] or [phone] [phone].   Call -295-QUIT-NOW if you do not meet the above criteria.             [facility] complies with applicable Federal civil rights laws and does not discriminate on the basis of race, color, national origin, age, disability, or sex.        Language Assistance Services     ATTENTION: Language assistance services are available, free of charge. Please call [phone].      ATENCIÓN: Si habla español, tiene a larsen disposición servicios gratuitos de asistencia " lingüística. [facility] [phone].     ARNULFO Ý: N?u b?n nói Ti?ng Vi?t, có các d?ch v? h? tr? ngôn ng? mi?n phí dành cho b?n. G?i s? [phone].

## 2017-01-14 NOTE — ED PROVIDER NOTES
Encounter Date: 1/13/2017    SCRIBE #1 NOTE: I, Theresa Leahy, am scribing for, and in the presence of,  Dr. Centeno. I have scribed the entire note.       History     Chief Complaint   Patient presents with    Back Pain     c/o atraumatic low back pain x 1 day. States pain is more on right side     Review of patient's allergies indicates:  No Known Allergies  HPI Comments: Time seen by provider: 9:59 PM    This is a 51 y.o. male who presents with complaint of low back pain. He reports onset of symptoms was a few days ago. The patient describes the pain as sharp. He denies any associated numbness, tingling or weakness in the legs, loss of bowel/bladder control, or genital numbness. The patient denies any recent trauma or injury to the back. According to the patient's record the patient was evaluated for the back pain 4 days ago. The patient received Decadron and Toradol with instructions to follow up with PCP. He reports he has not followed up with his PCP since evaluation.  In addition the patient complains of rash. He reports onset of symptoms was a few days ago. The patient states the rash is present along the shoulders, underarms and back. He notes the rash is itchy. The patient denies the use of any medication for the symptoms. He denies any associated nausea, vomiting, fever or chills.     The history is provided by the patient.     History reviewed. No pertinent past medical history.  No past medical history pertinent negatives.  Past Surgical History   Procedure Laterality Date    None N/A 6/5/2015     Family History   Problem Relation Age of Onset    Cancer Neg Hx     Diabetes Neg Hx     Heart disease Neg Hx     Hypertension Neg Hx     Stroke Neg Hx      Social History   Substance Use Topics    Smoking status: Current Every Day Smoker     Packs/day: 2.00     Types: Cigarettes    Smokeless tobacco: None    Alcohol use No     Review of Systems   Constitutional: Negative for chills and fever.    HENT: Negative for congestion and sore throat.    Eyes: Negative for redness and visual disturbance.   Respiratory: Negative for cough and shortness of breath.    Cardiovascular: Negative for chest pain and palpitations.   Gastrointestinal: Negative for abdominal pain, diarrhea, nausea and vomiting.   Genitourinary: Negative for dysuria.   Musculoskeletal: Positive for back pain.   Skin: Positive for rash.   Neurological: Negative for weakness and headaches.   Psychiatric/Behavioral: Negative for confusion.       Physical Exam   Initial Vitals   BP Pulse Resp Temp SpO2   01/13/17 2058 01/13/17 2058 01/13/17 2058 01/13/17 2058 01/13/17 2058   134/74 93 18 98 °F (36.7 °C) 96 %     Physical Exam    Nursing note and vitals reviewed.  Constitutional: He appears well-developed and well-nourished. He is not diaphoretic. No distress.   HENT:   Head: Normocephalic and atraumatic.   Right Ear: External ear normal.   Left Ear: External ear normal.   Eyes: Conjunctivae and EOM are normal.   Neck: Normal range of motion. Neck supple.   Musculoskeletal: Normal range of motion. He exhibits tenderness. He exhibits no edema.   Tenderness to bilateral lumbar paraspinal region. Tenderness to right SI joint. No midline C/T/L spine tenderness, crepitus or step-off.    Lymphadenopathy:     He has no cervical adenopathy.   Neurological: He is alert and oriented to person, place, and time. He has normal strength.   Skin: Skin is warm and dry. Rash noted.   Maculopapular rash across shoulders and back         ED Course   Procedures  Labs Reviewed - No data to display          Medical Decision Making:   ED Management:  The patient's back pain is likely a musculoskeletal strain.  There are no signs of saddle anesthesia, incontinence, neurologic deficits, fevers, trauma or midline tenderness on history or physical to suggest cauda equina, infectious process, fracture or subluxation.  I will treat with pain medication, anti-inflammatories and  muscle relaxers for relief.     Patient also complains of a rash.  The shoulder axillary region.  Likely from sweat.  Recommend Benadryl and gold Bond medicated powder.  Does not appear to be cellulitic no antibiotics indicated.             Scribe Attestation:   Scribe #1: I performed the above scribed service and the documentation accurately describes the services I performed. I attest to the accuracy of the note.    Attending Attestation:           Physician Attestation for Scribe:  Physician Attestation Statement for Scribe #1: I, Dr. Centeno, reviewed documentation, as scribed by Theresa Leahy in my presence, and it is both accurate and complete.                 ED Course     Clinical Impression:     1. Acute right-sided low back pain without sciatica    2. Yolanda Centeno, DO  01/13/17 4307

## 2017-01-14 NOTE — ED NOTES
LOC: The patient is awake, alert and aware of environment with an appropriate affect, the patient is oriented x 3 and speaking appropriately.  APPEARANCE: Patient resting comfortably and in no acute distress, patient is unkempt and malodorous.   SKIN: The skin is warm and dry, color consistent with ethnicity, patient has normal skin turgor and moist mucus membranes, skin intact, no breakdown or bruising noted.  MUSCULOSKELETAL: Patient moving all extremities well, no obvious swelling or deformities noted.  Pt complaining of lower back pain x 1 day.  RESPIRATORY: Airway is open and patent, respirations are spontaneous, patient has a normal effort and rate, no accessory muscle use noted.  CARDIAC: Patient has a normal rate and rhythm, no periphreal edema noted, capillary refill < 3 seconds.  ABDOMEN: Soft and non tender to palpation, no distention noted.  NEUROLOGIC: PERRL, eyes open spontaneously, behavior appropriate to situation, follows commands, facial expression symmetrical, bilateral hand grasp equal and even, purposeful motor response noted, normal sensation in all extremities when touched with a finger.  Bed in low locked position.  Call light in reach. [place] given

## 2019-12-27 ENCOUNTER — HOSPITAL ENCOUNTER (OUTPATIENT)
Age: 54
Discharge: HOME | End: 2019-12-27
Payer: MEDICAID

## 2019-12-27 LAB
INR BLD: 3.6
PROTHROMBIN TIME: 35.8 SEC (ref 11.8–14.6)

## 2019-12-27 PROCEDURE — 85610 PROTHROMBIN TIME: CPT

## 2019-12-27 PROCEDURE — 36415 COLL VENOUS BLD VENIPUNCTURE: CPT

## 2020-02-18 ENCOUNTER — TELEPHONE (OUTPATIENT)
Dept: UROLOGY | Age: 55
End: 2020-02-18

## 2020-02-26 ENCOUNTER — TELEPHONE (OUTPATIENT)
Dept: UROLOGY | Age: 55
End: 2020-02-26

## 2020-09-23 ENCOUNTER — TELEPHONE (OUTPATIENT)
Dept: PRIMARY CARE CLINIC | Age: 55
End: 2020-09-23

## 2020-09-23 NOTE — TELEPHONE ENCOUNTER
Neisha Mccoy from [facility] states patient will be discharged on 9/25/20 for COPD. She scheduled a new patient appt for hospital f/u on 9/30/20 with Dayan Arrieta. Please requests patient records.

## 2021-05-09 ENCOUNTER — HOSPITAL ENCOUNTER (INPATIENT)
Age: 56
LOS: 3 days | Discharge: HOME | DRG: 140 | End: 2021-05-12
Attending: EMERGENCY MEDICINE | Admitting: INTERNAL MEDICINE
Payer: MEDICAID

## 2021-05-09 ENCOUNTER — APPOINTMENT (OUTPATIENT)
Dept: CT IMAGING | Age: 56
DRG: 140 | End: 2021-05-09
Payer: MEDICAID

## 2021-05-09 ENCOUNTER — APPOINTMENT (OUTPATIENT)
Dept: GENERAL RADIOLOGY | Age: 56
DRG: 140 | End: 2021-05-09
Payer: MEDICAID

## 2021-05-09 DIAGNOSIS — J44.1 COPD EXACERBATION (HCC): Primary | ICD-10-CM

## 2021-05-09 DIAGNOSIS — G47.30 SLEEP APNEA, UNSPECIFIED TYPE: ICD-10-CM

## 2021-05-09 DIAGNOSIS — J44.1 CHRONIC OBSTRUCTIVE PULMONARY DISEASE WITH ACUTE EXACERBATION (HCC): ICD-10-CM

## 2021-05-09 PROBLEM — I82.4Y9 ACUTE VENOUS EMBOLISM AND THROMBOSIS OF DEEP VESSELS OF PROXIMAL LOWER EXTREMITY (HCC): Chronic | Status: ACTIVE | Noted: 2019-12-31

## 2021-05-09 PROBLEM — I82.4Y9 ACUTE VENOUS EMBOLISM AND THROMBOSIS OF DEEP VESSELS OF PROXIMAL LOWER EXTREMITY (HCC): Status: ACTIVE | Noted: 2019-12-31

## 2021-05-09 PROBLEM — J44.9 COPD (CHRONIC OBSTRUCTIVE PULMONARY DISEASE) (HCC): Status: ACTIVE | Noted: 2021-05-09

## 2021-05-09 PROBLEM — E11.9 DIABETES MELLITUS TYPE 2, CONTROLLED (HCC): Status: ACTIVE | Noted: 2021-05-09

## 2021-05-09 PROBLEM — Z72.0 TOBACCO ABUSE: Status: ACTIVE | Noted: 2021-05-09

## 2021-05-09 PROBLEM — R09.02 HYPOXIA: Status: ACTIVE | Noted: 2021-05-09

## 2021-05-09 PROBLEM — K21.9 GERD (GASTROESOPHAGEAL REFLUX DISEASE): Status: ACTIVE | Noted: 2019-11-12

## 2021-05-09 PROBLEM — D68.9 CLOTTING DISORDER (HCC): Status: ACTIVE | Noted: 2021-05-09

## 2021-05-09 LAB
-: NORMAL
ABSOLUTE EOS #: 0.32 K/UL (ref 0–0.4)
ABSOLUTE IMMATURE GRANULOCYTE: ABNORMAL K/UL (ref 0–0.3)
ABSOLUTE LYMPH #: 2.08 K/UL (ref 1–4.8)
ABSOLUTE MONO #: 0.85 K/UL (ref 0.1–1.2)
ALBUMIN SERPL-MCNC: 3.7 G/DL (ref 3.5–5.2)
ALBUMIN/GLOBULIN RATIO: 1.1 (ref 1–2.5)
ALP BLD-CCNC: 156 U/L (ref 40–129)
ALT SERPL-CCNC: 11 U/L (ref 5–41)
ANION GAP SERPL CALCULATED.3IONS-SCNC: 14 MMOL/L (ref 9–17)
AST SERPL-CCNC: 19 U/L
BASOPHILS # BLD: 1 % (ref 0–2)
BASOPHILS ABSOLUTE: 0.04 K/UL (ref 0–0.2)
BILIRUB SERPL-MCNC: 0.52 MG/DL (ref 0.3–1.2)
BNP INTERPRETATION: NORMAL
BUN BLDV-MCNC: 15 MG/DL (ref 6–20)
BUN/CREAT BLD: ABNORMAL (ref 9–20)
CALCIUM SERPL-MCNC: 9.5 MG/DL (ref 8.6–10.4)
CHLORIDE BLD-SCNC: 105 MMOL/L (ref 98–107)
CO2: 24 MMOL/L (ref 20–31)
CREAT SERPL-MCNC: 1 MG/DL (ref 0.7–1.2)
D-DIMER QUANTITATIVE: 0.51 MG/L FEU
D-DIMER QUANTITATIVE: NORMAL MG/L FEU
DIFFERENTIAL TYPE: ABNORMAL
EOSINOPHILS RELATIVE PERCENT: 4 % (ref 1–4)
GFR AFRICAN AMERICAN: >60 ML/MIN
GFR NON-AFRICAN AMERICAN: >60 ML/MIN
GFR SERPL CREATININE-BSD FRML MDRD: ABNORMAL ML/MIN/{1.73_M2}
GFR SERPL CREATININE-BSD FRML MDRD: ABNORMAL ML/MIN/{1.73_M2}
GLUCOSE BLD-MCNC: 113 MG/DL (ref 70–99)
GLUCOSE BLD-MCNC: 137 MG/DL (ref 75–110)
GLUCOSE BLD-MCNC: 174 MG/DL (ref 75–110)
GLUCOSE BLD-MCNC: 238 MG/DL (ref 75–110)
GLUCOSE BLD-MCNC: 274 MG/DL (ref 75–110)
HCT VFR BLD CALC: 48.7 % (ref 41–53)
HEMOGLOBIN: 15.3 G/DL (ref 13.5–17.5)
IMMATURE GRANULOCYTES: ABNORMAL %
LACTIC ACID, SEPSIS WHOLE BLOOD: NORMAL MMOL/L (ref 0.5–1.9)
LACTIC ACID, SEPSIS WHOLE BLOOD: NORMAL MMOL/L (ref 0.5–1.9)
LACTIC ACID, SEPSIS: 1.4 MMOL/L (ref 0.5–1.9)
LACTIC ACID, SEPSIS: 1.6 MMOL/L (ref 0.5–1.9)
LIPASE: 26 U/L (ref 13–60)
LYMPHOCYTES # BLD: 27 % (ref 24–44)
MAGNESIUM: 2.2 MG/DL (ref 1.6–2.6)
MCH RBC QN AUTO: 29.4 PG (ref 26–34)
MCHC RBC AUTO-ENTMCNC: 31.4 G/DL (ref 31–37)
MCV RBC AUTO: 93.7 FL (ref 80–100)
MONOCYTES # BLD: 11 % (ref 2–11)
NRBC AUTOMATED: ABNORMAL PER 100 WBC
PDW BLD-RTO: 16.6 % (ref 12.5–15.4)
PLATELET # BLD: 178 K/UL (ref 140–450)
PLATELET ESTIMATE: ABNORMAL
PMV BLD AUTO: 11.6 FL (ref 8–14)
POC TROPONIN I: 0.03 NG/ML (ref 0–0.1)
POC TROPONIN INTERP: NORMAL
POTASSIUM SERPL-SCNC: 4.4 MMOL/L (ref 3.7–5.3)
PRO-BNP: 95 PG/ML
RBC # BLD: 5.2 M/UL (ref 4.5–5.9)
RBC # BLD: ABNORMAL 10*6/UL
REASON FOR REJECTION: NORMAL
SARS-COV-2, RAPID: NOT DETECTED
SEG NEUTROPHILS: 57 % (ref 36–66)
SEGMENTED NEUTROPHILS ABSOLUTE COUNT: 4.47 K/UL (ref 1.8–7.7)
SODIUM BLD-SCNC: 143 MMOL/L (ref 135–144)
SPECIMEN DESCRIPTION: NORMAL
TOTAL PROTEIN: 7.2 G/DL (ref 6.4–8.3)
TROPONIN INTERP: NORMAL
TROPONIN INTERP: NORMAL
TROPONIN T: NORMAL NG/ML
TROPONIN T: NORMAL NG/ML
TROPONIN, HIGH SENSITIVITY: 12 NG/L (ref 0–22)
TROPONIN, HIGH SENSITIVITY: 15 NG/L (ref 0–22)
WBC # BLD: 7.8 K/UL (ref 3.5–11)
WBC # BLD: ABNORMAL 10*3/UL
ZZ NTE CLEAN UP: ORDERED TEST: NORMAL
ZZ NTE WITH NAME CLEAN UP: SPECIMEN SOURCE: NORMAL

## 2021-05-09 PROCEDURE — 6370000000 HC RX 637 (ALT 250 FOR IP): Performed by: NURSE PRACTITIONER

## 2021-05-09 PROCEDURE — 80053 COMPREHEN METABOLIC PANEL: CPT

## 2021-05-09 PROCEDURE — 2580000003 HC RX 258: Performed by: EMERGENCY MEDICINE

## 2021-05-09 PROCEDURE — 83036 HEMOGLOBIN GLYCOSYLATED A1C: CPT

## 2021-05-09 PROCEDURE — 99255 IP/OBS CONSLTJ NEW/EST HI 80: CPT | Performed by: INTERNAL MEDICINE

## 2021-05-09 PROCEDURE — APPSS180 APP SPLIT SHARED TIME > 60 MINUTES: Performed by: NURSE PRACTITIONER

## 2021-05-09 PROCEDURE — 85379 FIBRIN DEGRADATION QUANT: CPT

## 2021-05-09 PROCEDURE — 85025 COMPLETE CBC W/AUTO DIFF WBC: CPT

## 2021-05-09 PROCEDURE — 6370000000 HC RX 637 (ALT 250 FOR IP): Performed by: EMERGENCY MEDICINE

## 2021-05-09 PROCEDURE — 6360000004 HC RX CONTRAST MEDICATION: Performed by: EMERGENCY MEDICINE

## 2021-05-09 PROCEDURE — 83880 ASSAY OF NATRIURETIC PEPTIDE: CPT

## 2021-05-09 PROCEDURE — 71045 X-RAY EXAM CHEST 1 VIEW: CPT

## 2021-05-09 PROCEDURE — 36415 COLL VENOUS BLD VENIPUNCTURE: CPT

## 2021-05-09 PROCEDURE — 94660 CPAP INITIATION&MGMT: CPT

## 2021-05-09 PROCEDURE — 6360000002 HC RX W HCPCS: Performed by: EMERGENCY MEDICINE

## 2021-05-09 PROCEDURE — 83735 ASSAY OF MAGNESIUM: CPT

## 2021-05-09 PROCEDURE — 94761 N-INVAS EAR/PLS OXIMETRY MLT: CPT

## 2021-05-09 PROCEDURE — 71260 CT THORAX DX C+: CPT

## 2021-05-09 PROCEDURE — 83605 ASSAY OF LACTIC ACID: CPT

## 2021-05-09 PROCEDURE — 96374 THER/PROPH/DIAG INJ IV PUSH: CPT

## 2021-05-09 PROCEDURE — 6360000002 HC RX W HCPCS: Performed by: NURSE PRACTITIONER

## 2021-05-09 PROCEDURE — 87635 SARS-COV-2 COVID-19 AMP PRB: CPT

## 2021-05-09 PROCEDURE — 2000000000 HC ICU R&B

## 2021-05-09 PROCEDURE — 84484 ASSAY OF TROPONIN QUANT: CPT

## 2021-05-09 PROCEDURE — 82947 ASSAY GLUCOSE BLOOD QUANT: CPT

## 2021-05-09 PROCEDURE — 93005 ELECTROCARDIOGRAM TRACING: CPT | Performed by: EMERGENCY MEDICINE

## 2021-05-09 PROCEDURE — 99223 1ST HOSP IP/OBS HIGH 75: CPT | Performed by: INTERNAL MEDICINE

## 2021-05-09 PROCEDURE — 2580000003 HC RX 258: Performed by: NURSE PRACTITIONER

## 2021-05-09 PROCEDURE — 99284 EMERGENCY DEPT VISIT MOD MDM: CPT

## 2021-05-09 PROCEDURE — 94640 AIRWAY INHALATION TREATMENT: CPT

## 2021-05-09 PROCEDURE — 83690 ASSAY OF LIPASE: CPT

## 2021-05-09 PROCEDURE — 99291 CRITICAL CARE FIRST HOUR: CPT | Performed by: NURSE PRACTITIONER

## 2021-05-09 RX ORDER — FUROSEMIDE 40 MG/1
TABLET ORAL
Status: ON HOLD | COMMUNITY
Start: 2021-02-28 | End: 2021-06-12 | Stop reason: HOSPADM

## 2021-05-09 RX ORDER — NICOTINE 21 MG/24HR
1 PATCH, TRANSDERMAL 24 HOURS TRANSDERMAL DAILY PRN
Status: DISCONTINUED | OUTPATIENT
Start: 2021-05-09 | End: 2021-05-12 | Stop reason: HOSPADM

## 2021-05-09 RX ORDER — ACETAMINOPHEN 325 MG/1
650 TABLET ORAL EVERY 6 HOURS PRN
Status: DISCONTINUED | OUTPATIENT
Start: 2021-05-09 | End: 2021-05-12 | Stop reason: HOSPADM

## 2021-05-09 RX ORDER — ACETAMINOPHEN 650 MG/1
650 SUPPOSITORY RECTAL EVERY 6 HOURS PRN
Status: DISCONTINUED | OUTPATIENT
Start: 2021-05-09 | End: 2021-05-12 | Stop reason: HOSPADM

## 2021-05-09 RX ORDER — METHYLPREDNISOLONE SODIUM SUCCINATE 125 MG/2ML
60 INJECTION, POWDER, LYOPHILIZED, FOR SOLUTION INTRAMUSCULAR; INTRAVENOUS EVERY 6 HOURS
Status: COMPLETED | OUTPATIENT
Start: 2021-05-09 | End: 2021-05-11

## 2021-05-09 RX ORDER — FAMOTIDINE 20 MG/1
20 TABLET, FILM COATED ORAL 2 TIMES DAILY
Status: DISCONTINUED | OUTPATIENT
Start: 2021-05-09 | End: 2021-05-12 | Stop reason: HOSPADM

## 2021-05-09 RX ORDER — BUDESONIDE AND FORMOTEROL FUMARATE DIHYDRATE 160; 4.5 UG/1; UG/1
2 AEROSOL RESPIRATORY (INHALATION) 2 TIMES DAILY
Status: DISCONTINUED | OUTPATIENT
Start: 2021-05-09 | End: 2021-05-12 | Stop reason: HOSPADM

## 2021-05-09 RX ORDER — ZOLPIDEM TARTRATE 10 MG/1
10 TABLET ORAL NIGHTLY PRN
COMMUNITY
Start: 2021-04-27

## 2021-05-09 RX ORDER — GABAPENTIN 600 MG/1
600 TABLET ORAL DAILY
Status: ON HOLD | COMMUNITY
End: 2021-05-10

## 2021-05-09 RX ORDER — ALBUTEROL SULFATE 2.5 MG/3ML
SOLUTION RESPIRATORY (INHALATION)
COMMUNITY
Start: 2021-02-22

## 2021-05-09 RX ORDER — LORAZEPAM 2 MG/ML
0.5 INJECTION INTRAMUSCULAR EVERY 4 HOURS PRN
Status: DISCONTINUED | OUTPATIENT
Start: 2021-05-09 | End: 2021-05-12 | Stop reason: HOSPADM

## 2021-05-09 RX ORDER — DEXTROSE MONOHYDRATE 50 MG/ML
100 INJECTION, SOLUTION INTRAVENOUS PRN
Status: DISCONTINUED | OUTPATIENT
Start: 2021-05-09 | End: 2021-05-12 | Stop reason: HOSPADM

## 2021-05-09 RX ORDER — ONDANSETRON 2 MG/ML
4 INJECTION INTRAMUSCULAR; INTRAVENOUS EVERY 6 HOURS PRN
Status: DISCONTINUED | OUTPATIENT
Start: 2021-05-09 | End: 2021-05-12 | Stop reason: HOSPADM

## 2021-05-09 RX ORDER — GABAPENTIN 400 MG/1
400 CAPSULE ORAL 2 TIMES DAILY
Status: DISCONTINUED | OUTPATIENT
Start: 2021-05-09 | End: 2021-05-09

## 2021-05-09 RX ORDER — ALBUTEROL SULFATE 2.5 MG/3ML
2.5 SOLUTION RESPIRATORY (INHALATION) ONCE
Status: COMPLETED | OUTPATIENT
Start: 2021-05-09 | End: 2021-05-09

## 2021-05-09 RX ORDER — PREDNISONE 20 MG/1
40 TABLET ORAL DAILY
Status: DISCONTINUED | OUTPATIENT
Start: 2021-05-11 | End: 2021-05-09

## 2021-05-09 RX ORDER — PROMETHAZINE HYDROCHLORIDE 25 MG/1
12.5 TABLET ORAL EVERY 6 HOURS PRN
Status: DISCONTINUED | OUTPATIENT
Start: 2021-05-09 | End: 2021-05-12 | Stop reason: HOSPADM

## 2021-05-09 RX ORDER — NICOTINE POLACRILEX 4 MG
15 LOZENGE BUCCAL PRN
Status: DISCONTINUED | OUTPATIENT
Start: 2021-05-09 | End: 2021-05-12 | Stop reason: HOSPADM

## 2021-05-09 RX ORDER — DEXTROSE MONOHYDRATE 25 G/50ML
12.5 INJECTION, SOLUTION INTRAVENOUS PRN
Status: DISCONTINUED | OUTPATIENT
Start: 2021-05-09 | End: 2021-05-12 | Stop reason: HOSPADM

## 2021-05-09 RX ORDER — GABAPENTIN 400 MG/1
400 CAPSULE ORAL 2 TIMES DAILY
Status: ON HOLD | COMMUNITY
End: 2021-05-10

## 2021-05-09 RX ORDER — BUDESONIDE AND FORMOTEROL FUMARATE DIHYDRATE 160; 4.5 UG/1; UG/1
2 AEROSOL RESPIRATORY (INHALATION) 2 TIMES DAILY
COMMUNITY
Start: 2020-10-08

## 2021-05-09 RX ORDER — TRAMADOL HYDROCHLORIDE 50 MG/1
TABLET ORAL
COMMUNITY
Start: 2021-04-22

## 2021-05-09 RX ORDER — SODIUM CHLORIDE 0.9 % (FLUSH) 0.9 %
10 SYRINGE (ML) INJECTION PRN
Status: DISCONTINUED | OUTPATIENT
Start: 2021-05-09 | End: 2021-05-12 | Stop reason: HOSPADM

## 2021-05-09 RX ORDER — METHYLPREDNISOLONE SODIUM SUCCINATE 125 MG/2ML
125 INJECTION, POWDER, LYOPHILIZED, FOR SOLUTION INTRAMUSCULAR; INTRAVENOUS ONCE
Status: COMPLETED | OUTPATIENT
Start: 2021-05-09 | End: 2021-05-09

## 2021-05-09 RX ORDER — HYDROXYZINE HYDROCHLORIDE 25 MG/1
25 TABLET, FILM COATED ORAL EVERY 8 HOURS PRN
Status: ON HOLD | COMMUNITY
End: 2021-06-12 | Stop reason: HOSPADM

## 2021-05-09 RX ORDER — FUROSEMIDE 10 MG/ML
20 INJECTION INTRAMUSCULAR; INTRAVENOUS ONCE
Status: COMPLETED | OUTPATIENT
Start: 2021-05-09 | End: 2021-05-09

## 2021-05-09 RX ORDER — SODIUM CHLORIDE 0.9 % (FLUSH) 0.9 %
5-40 SYRINGE (ML) INJECTION EVERY 12 HOURS SCHEDULED
Status: DISCONTINUED | OUTPATIENT
Start: 2021-05-09 | End: 2021-05-12 | Stop reason: HOSPADM

## 2021-05-09 RX ORDER — PREGABALIN 25 MG/1
50 CAPSULE ORAL 3 TIMES DAILY
Status: DISCONTINUED | OUTPATIENT
Start: 2021-05-09 | End: 2021-05-12 | Stop reason: HOSPADM

## 2021-05-09 RX ORDER — PREDNISONE 20 MG/1
40 TABLET ORAL DAILY
Status: DISCONTINUED | OUTPATIENT
Start: 2021-05-11 | End: 2021-05-11

## 2021-05-09 RX ORDER — IPRATROPIUM BROMIDE AND ALBUTEROL SULFATE 2.5; .5 MG/3ML; MG/3ML
1 SOLUTION RESPIRATORY (INHALATION)
Status: DISCONTINUED | OUTPATIENT
Start: 2021-05-09 | End: 2021-05-12 | Stop reason: HOSPADM

## 2021-05-09 RX ORDER — TRAMADOL HYDROCHLORIDE 50 MG/1
50 TABLET ORAL EVERY 6 HOURS PRN
Status: DISCONTINUED | OUTPATIENT
Start: 2021-05-09 | End: 2021-05-12 | Stop reason: HOSPADM

## 2021-05-09 RX ORDER — SODIUM CHLORIDE 9 MG/ML
25 INJECTION, SOLUTION INTRAVENOUS PRN
Status: DISCONTINUED | OUTPATIENT
Start: 2021-05-09 | End: 2021-05-12 | Stop reason: HOSPADM

## 2021-05-09 RX ORDER — POLYETHYLENE GLYCOL 3350 17 G/17G
17 POWDER, FOR SOLUTION ORAL DAILY PRN
Status: DISCONTINUED | OUTPATIENT
Start: 2021-05-09 | End: 2021-05-12 | Stop reason: HOSPADM

## 2021-05-09 RX ORDER — SODIUM CHLORIDE 0.9 % (FLUSH) 0.9 %
5-40 SYRINGE (ML) INJECTION PRN
Status: DISCONTINUED | OUTPATIENT
Start: 2021-05-09 | End: 2021-05-12 | Stop reason: HOSPADM

## 2021-05-09 RX ORDER — LORAZEPAM 2 MG/ML
0.5 INJECTION INTRAMUSCULAR EVERY 6 HOURS PRN
Status: DISCONTINUED | OUTPATIENT
Start: 2021-05-09 | End: 2021-05-12 | Stop reason: HOSPADM

## 2021-05-09 RX ORDER — 0.9 % SODIUM CHLORIDE 0.9 %
80 INTRAVENOUS SOLUTION INTRAVENOUS ONCE
Status: COMPLETED | OUTPATIENT
Start: 2021-05-09 | End: 2021-05-09

## 2021-05-09 RX ORDER — HYDROXYZINE PAMOATE 25 MG/1
CAPSULE ORAL
COMMUNITY
Start: 2021-05-06

## 2021-05-09 RX ORDER — METHYLPREDNISOLONE SODIUM SUCCINATE 40 MG/ML
40 INJECTION, POWDER, LYOPHILIZED, FOR SOLUTION INTRAMUSCULAR; INTRAVENOUS EVERY 6 HOURS
Status: DISCONTINUED | OUTPATIENT
Start: 2021-05-09 | End: 2021-05-09

## 2021-05-09 RX ORDER — 0.9 % SODIUM CHLORIDE 0.9 %
1000 INTRAVENOUS SOLUTION INTRAVENOUS ONCE
Status: COMPLETED | OUTPATIENT
Start: 2021-05-09 | End: 2021-05-09

## 2021-05-09 RX ORDER — ALBUTEROL SULFATE 2.5 MG/3ML
2.5 SOLUTION RESPIRATORY (INHALATION)
Status: DISCONTINUED | OUTPATIENT
Start: 2021-05-09 | End: 2021-05-12 | Stop reason: HOSPADM

## 2021-05-09 RX ORDER — IPRATROPIUM BROMIDE AND ALBUTEROL SULFATE 2.5; .5 MG/3ML; MG/3ML
1 SOLUTION RESPIRATORY (INHALATION) ONCE
Status: COMPLETED | OUTPATIENT
Start: 2021-05-09 | End: 2021-05-09

## 2021-05-09 RX ORDER — GABAPENTIN 600 MG/1
600 TABLET ORAL DAILY
Status: DISCONTINUED | OUTPATIENT
Start: 2021-05-09 | End: 2021-05-09

## 2021-05-09 RX ORDER — PREGABALIN 50 MG/1
50 CAPSULE ORAL 3 TIMES DAILY
COMMUNITY
Start: 2021-04-27

## 2021-05-09 RX ADMIN — INSULIN LISPRO 1 UNITS: 100 INJECTION, SOLUTION INTRAVENOUS; SUBCUTANEOUS at 08:40

## 2021-05-09 RX ADMIN — SODIUM CHLORIDE, PRESERVATIVE FREE 10 ML: 5 INJECTION INTRAVENOUS at 20:13

## 2021-05-09 RX ADMIN — BUDESONIDE AND FORMOTEROL FUMARATE DIHYDRATE 2 PUFF: 160; 4.5 AEROSOL RESPIRATORY (INHALATION) at 08:48

## 2021-05-09 RX ADMIN — INSULIN LISPRO 2 UNITS: 100 INJECTION, SOLUTION INTRAVENOUS; SUBCUTANEOUS at 20:04

## 2021-05-09 RX ADMIN — METHYLPREDNISOLONE SODIUM SUCCINATE 125 MG: 125 INJECTION, POWDER, FOR SOLUTION INTRAMUSCULAR; INTRAVENOUS at 03:30

## 2021-05-09 RX ADMIN — ENOXAPARIN SODIUM 40 MG: 40 INJECTION SUBCUTANEOUS at 08:11

## 2021-05-09 RX ADMIN — ALBUTEROL SULFATE 2.5 MG: 2.5 SOLUTION RESPIRATORY (INHALATION) at 02:34

## 2021-05-09 RX ADMIN — IPRATROPIUM BROMIDE AND ALBUTEROL SULFATE 1 AMPULE: .5; 3 SOLUTION RESPIRATORY (INHALATION) at 19:37

## 2021-05-09 RX ADMIN — LORAZEPAM 0.5 MG: 2 INJECTION INTRAMUSCULAR; INTRAVENOUS at 11:28

## 2021-05-09 RX ADMIN — SODIUM CHLORIDE 1000 ML: 9 INJECTION, SOLUTION INTRAVENOUS at 03:00

## 2021-05-09 RX ADMIN — IPRATROPIUM BROMIDE AND ALBUTEROL SULFATE 1 AMPULE: .5; 3 SOLUTION RESPIRATORY (INHALATION) at 02:33

## 2021-05-09 RX ADMIN — METHYLPREDNISOLONE SODIUM SUCCINATE 60 MG: 125 INJECTION, POWDER, FOR SOLUTION INTRAMUSCULAR; INTRAVENOUS at 11:27

## 2021-05-09 RX ADMIN — GABAPENTIN 600 MG: 600 TABLET ORAL at 16:10

## 2021-05-09 RX ADMIN — FAMOTIDINE 20 MG: 20 TABLET ORAL at 08:11

## 2021-05-09 RX ADMIN — LORAZEPAM 0.5 MG: 2 INJECTION INTRAMUSCULAR; INTRAVENOUS at 20:03

## 2021-05-09 RX ADMIN — BUDESONIDE AND FORMOTEROL FUMARATE DIHYDRATE 2 PUFF: 160; 4.5 AEROSOL RESPIRATORY (INHALATION) at 19:45

## 2021-05-09 RX ADMIN — APIXABAN 5 MG: 5 TABLET, FILM COATED ORAL at 17:02

## 2021-05-09 RX ADMIN — SODIUM CHLORIDE, PRESERVATIVE FREE 10 ML: 5 INJECTION INTRAVENOUS at 08:12

## 2021-05-09 RX ADMIN — SODIUM CHLORIDE, PRESERVATIVE FREE 10 ML: 5 INJECTION INTRAVENOUS at 04:20

## 2021-05-09 RX ADMIN — FAMOTIDINE 20 MG: 20 TABLET ORAL at 20:04

## 2021-05-09 RX ADMIN — INSULIN LISPRO 3 UNITS: 100 INJECTION, SOLUTION INTRAVENOUS; SUBCUTANEOUS at 12:00

## 2021-05-09 RX ADMIN — IPRATROPIUM BROMIDE AND ALBUTEROL SULFATE 1 AMPULE: .5; 3 SOLUTION RESPIRATORY (INHALATION) at 08:35

## 2021-05-09 RX ADMIN — FUROSEMIDE 20 MG: 10 INJECTION, SOLUTION INTRAMUSCULAR; INTRAVENOUS at 08:11

## 2021-05-09 RX ADMIN — METHYLPREDNISOLONE SODIUM SUCCINATE 60 MG: 125 INJECTION, POWDER, FOR SOLUTION INTRAMUSCULAR; INTRAVENOUS at 20:03

## 2021-05-09 RX ADMIN — IPRATROPIUM BROMIDE AND ALBUTEROL SULFATE 1 AMPULE: .5; 3 SOLUTION RESPIRATORY (INHALATION) at 11:43

## 2021-05-09 RX ADMIN — PREGABALIN 50 MG: 25 CAPSULE ORAL at 15:45

## 2021-05-09 RX ADMIN — IPRATROPIUM BROMIDE AND ALBUTEROL SULFATE 1 AMPULE: .5; 3 SOLUTION RESPIRATORY (INHALATION) at 15:34

## 2021-05-09 RX ADMIN — SODIUM CHLORIDE 80 ML: 9 INJECTION, SOLUTION INTRAVENOUS at 04:20

## 2021-05-09 RX ADMIN — PREGABALIN 50 MG: 25 CAPSULE ORAL at 20:03

## 2021-05-09 RX ADMIN — IOPAMIDOL 75 ML: 755 INJECTION, SOLUTION INTRAVENOUS at 04:19

## 2021-05-09 ASSESSMENT — ENCOUNTER SYMPTOMS
VOMITING: 0
COUGH: 1
ABDOMINAL PAIN: 0
NAUSEA: 0
STRIDOR: 0
DIARRHEA: 0
CONSTIPATION: 0
SORE THROAT: 0
BLOOD IN STOOL: 0
CHEST TIGHTNESS: 1
COUGH: 0
WHEEZING: 1
SHORTNESS OF BREATH: 1

## 2021-05-09 ASSESSMENT — PAIN DESCRIPTION - FREQUENCY: FREQUENCY: CONTINUOUS

## 2021-05-09 ASSESSMENT — PAIN SCALES - GENERAL
PAINLEVEL_OUTOF10: 0
PAINLEVEL_OUTOF10: 10

## 2021-05-09 NOTE — ED PROVIDER NOTES
[id number] Pending sale to [facility]  [address] [facility] [address]  Phone: [phone]  Fax: [phone] [facility]          Pt Name: Teresa Kelly  MRN: [id number]  [place] 1965  Date of evaluation: 5/9/2021      CHIEF COMPLAINT     No chief complaint on file. HISTORY OF PRESENT ILLNESS       Teresa Kelly is a 49 y.o. male who presents with difficulty breathing that began about 2 to 3 hours prior to arrival.  States it woke him up from sleeping. Came in by EMS. Had 1 DuoNeb treatment prior to arrival with EMS but still is having difficulty breathing. Does have history of COPD. Nothing otherwise makes his symptoms better or worse. Denies any chest pain or pressure. Denies other symptoms or concerns or anything outside his baseline. REVIEW OF SYSTEMS       Review of Systems   Constitutional: Negative for chills and fever. HENT: Negative for sore throat. Respiratory: Positive for cough, shortness of breath and wheezing. Cardiovascular: Negative for chest pain. Gastrointestinal: Negative for abdominal pain, diarrhea, nausea and vomiting. Musculoskeletal: Negative for neck pain. Skin: Negative for rash. Neurological: Negative for weakness and numbness. PAST MEDICAL HISTORY    has no past medical history on file. SURGICAL HISTORY      has no past surgical history on file. CURRENT MEDICATIONS       Previous Medications    No medications on file       ALLERGIES     has no allergies on file. FAMILY HISTORY     has no family status information on file. family history is not on file. SOCIAL HISTORY          PHYSICAL EXAM     INITIAL VITALS:  oxygen saturation is 88% (abnormal). Physical Exam   Constitutional: He appears well-developed and well-nourished. No distress. HENT:   Head: Normocephalic and atraumatic.    Right Ear: External ear normal.   Left Ear: External ear normal.   Eyes: Lids are normal. Right eye exhibits no discharge. Left eye exhibits no discharge. Neck: No tracheal deviation present. Cardiovascular: Normal rate and regular rhythm. Pulmonary/Chest: Effort normal. No accessory muscle usage. No tachypnea. No respiratory distress. He has decreased breath sounds. He has wheezes. Abdominal: Soft. There is no abdominal tenderness. Neurological: He is alert. GCS eye subscore is 4. GCS verbal subscore is 5. GCS motor subscore is 6. Skin: Skin is warm and dry. Psychiatric: He has a normal mood and affect. His behavior is normal.         DIFFERENTIAL DIAGNOSIS/ MDM:     Plan at this time will be to initiate a further work-up and treat symptomatically. We will also start steroids. DIAGNOSTIC RESULTS     EKG: All EKG's are interpreted by the Emergency Department Physician who either signs or Co-signs this chart in the absence of a cardiologist.  Interpreted by Chelo Collins DO     Rhythm: sinus   Rate: 111  Axis: Left  Ectopy: PACs  Conduction: Sinus tachycardia  ST Segments: No acute elevation or depression  T Waves: No acute findings    Clinical Impression: Nonspecific ECG. Sinus rhythm. No acute infarction/ischemia noted. RADIOLOGY:   Interpretation per the Radiologist below, if available at the time of this note:  CT CHEST PULMONARY EMBOLISM W CONTRAST   Final Result   No evidence of central and proximal pulmonary embolus but the exam is quite   limited as pulmonary arterial opacification is poor. Cholelithiasis without evidence of cholecystitis. No results found.     LABS:  Results for orders placed or performed during the hospital encounter of 05/09/21   Troponin   Result Value Ref Range    Troponin, High Sensitivity 15 0 - 22 ng/L    Troponin T NOT REPORTED <0.03 ng/mL    Troponin Interp NOT REPORTED    Comprehensive Metabolic Panel   Result Value Ref Range    Glucose 113 (H) 70 - 99 mg/dL    BUN 15 6 - 20 mg/dL    CREATININE 1.00 0.70 - 1.20 mg/dL    Bun/Cre Ratio NOT REPORTED 9 - 20    Calcium 9.5 8.6 - 10.4 mg/dL    Sodium 143 135 - 144 mmol/L    Potassium 4.4 3.7 - 5.3 mmol/L    Chloride 105 98 - 107 mmol/L    CO2 24 20 - 31 mmol/L    Anion Gap 14 9 - 17 mmol/L    Alkaline Phosphatase 156 (H) 40 - 129 U/L    ALT 11 5 - 41 U/L    AST 19 <40 U/L    Total Bilirubin 0.52 0.3 - 1.2 mg/dL    Total Protein 7.2 6.4 - 8.3 g/dL    Albumin 3.7 3.5 - 5.2 g/dL    Albumin/Globulin Ratio 1.1 1.0 - 2.5    GFR Non-African American >60 >60 mL/min    GFR African American >60 >60 mL/min    GFR Comment          GFR Staging NOT REPORTED    Magnesium   Result Value Ref Range    Magnesium 2.2 1.6 - 2.6 mg/dL   Lactate, Sepsis   Result Value Ref Range    Lactic Acid, Sepsis 1.4 0.5 - 1.9 mmol/L    Lactic Acid, Sepsis, Whole Blood NOT REPORTED 0.5 - 1.9 mmol/L   Lipase   Result Value Ref Range    Lipase 26 13 - 60 U/L   D-Dimer, Quantitative   Result Value Ref Range    D-Dimer, Quant 0.56 mg/L FEU   Brain Natriuretic Peptide   Result Value Ref Range    Pro-BNP 95 <300 pg/mL    BNP Interpretation Pro-BNP Reference Range:    CBC Auto Differential   Result Value Ref Range    WBC 7.8 3.5 - 11.0 k/uL    RBC 5.20 4.5 - 5.9 m/uL    Hemoglobin 15.3 13.5 - 17.5 g/dL    Hematocrit 48.7 41 - 53 %    MCV 93.7 80 - 100 fL    MCH 29.4 26 - 34 pg    MCHC 31.4 31 - 37 g/dL    RDW 16.6 (H) 12.5 - 15.4 %    Platelets 178 008 - 246 k/uL    MPV 11.6 8.0 - 14.0 fL    NRBC Automated NOT REPORTED per 100 WBC    Differential Type NOT REPORTED     Immature Granulocytes NOT REPORTED 0 %    Absolute Immature Granulocyte NOT REPORTED 0.00 - 0.30 k/uL    WBC Morphology NOT REPORTED     RBC Morphology NOT REPORTED     Platelet Estimate NOT REPORTED     Seg Neutrophils 57 36 - 66 %    Lymphocytes 27 24 - 44 %    Monocytes 11 2 - 11 %    Eosinophils % 4 1 - 4 %    Basophils 1 0 - 2 %    Segs Absolute 4.47 1.8 - 7.7 k/uL    Absolute Lymph # 2.08 1.0 - 4.8 k/uL    Absolute Mono # 0.85 0.1 - 1.2 k/uL    Absolute Eos # 0.32 0.0 - 0.4 k/uL    Basophils Absolute 0.04 0.0 - 0.2 k/uL   D-Dimer, Quantitative   Result Value Ref Range    D-Dimer, Quant 0.51 mg/L FEU   SPECIMEN REJECTION   Result Value Ref Range    Specimen Source . BLOOD     Ordered Test DIME CDP     Reason for Rejection Unable to perform testing: Specimen clotted. - NOT REPORTED    POCT troponin   Result Value Ref Range    POC Troponin I 0.03 0.00 - 0.10 ng/mL    POC Troponin Interp             EMERGENCY DEPARTMENT COURSE:     The patient was given the following medications:  Orders Placed This Encounter   Medications    0.9 % sodium chloride bolus    ipratropium-albuterol (DUONEB) nebulizer solution 1 ampule    albuterol (PROVENTIL) nebulizer solution 2.5 mg    methylPREDNISolone sodium (SOLU-MEDROL) injection 125 mg    iopamidol (ISOVUE-370) 76 % injection 75 mL    sodium chloride flush 0.9 % injection 10 mL    0.9 % sodium chloride bolus        Vitals:    Vitals:    05/09/21 0449   SpO2: (!) 88%     -------------------------   ,  ,  ,        Re-evaluation Notes    Patient doing well on reevaluation. No acute changes. I spoke with the hospitalist who accepted admission of the patient. Patient still having respiratory complaints and is still wheezy. Will check coronavirus swab. CT shows no evidence of lobar consolidation or pneumonia. I feel this is a COPD exacerbation. We will continue with steroids. Patient updated and agreeable with the plan. Suspicion low for PE. No leukocytosis. I do not feel he requires antibiotics at this time. CONSULTS:    None    CRITICAL CARE:     None    PROCEDURES:    None    FINAL IMPRESSION      1. COPD exacerbation (HCC)          DISPOSITION/PLAN   DISPOSITION        Condition on Disposition    Improved    PATIENT REFERRED TO:  No follow-up provider specified.     DISCHARGE MEDICATIONS:  New Prescriptions    No medications on file       (Please note that portions of this note were completed with a voice recognition program.

## 2021-05-09 NOTE — H&P
Attending Supervising Physicians Attestation Statement  I have seen and examined Moi Gonzalez and the reed elements of all parts of the encounter have been performed by me. I agree with the assessment, plan and orders as documented by the Advanced Practice Provider. I discussed the findings and plans with the nurse practitioner and agree as documented in her note . See note of Alex Peace for more details    Additional Comments:   49 M with hx of COPD who presented with sob  History of chronic sob, symptoms worse the past day  Smokes 1 pack every 4 days  No home O2 use  In ER CT negative for PE/PNA    Principal Problem:    Chronic obstructive pulmonary disease with acute exacerbation ([facility] [address]  Active Problems:    Diabetes mellitus type 2, controlled ([facility] [address]    GERD (gastroesophageal reflux disease)    Sleep apnea    Tobacco abuse    COPD exacerbation ([facility] [address]  Resolved Problems:    * No resolved hospital problems.  *    Plan:  - Continue IV steroids  - Supplemental O2  - Home O2 eval on d/c if needed  - Tobacco cessation     Electronically signed by Shree Montoya MD on 5/9/2021 at 10:00 AM      Addendum:    Notified of increasing sob  O2 saturations stable  Seen at bedside   Started on bipap, RT at bedside  Anti-anxiety medications   Pulmonology consulted  Discussed with NP    Electronically signed by Shree Montoya MD on 5/9/2021 at 11:17 AM

## 2021-05-09 NOTE — CONSULTS
Pulmonary/Critical Care consultation    Patient's name:  Rosalinda Haynes  Medical Record Number: [id number]  Patient's account/billing number: [de-identified]  Patient's YOB: 1965  Age: 49 y.o. Date of Admission: 5/9/2021  2:24 AM  Date of Consult: 5/9/2021      Primary Care Physician: Paz Soulier, APRN - CNP      Code Status: Full Code    Reason for consult: Acute respiratory failure with hypoxemia secondary to COPD exacerbation in a patient with morbid obesity and sleep apnea      HISTORY OF PRESENT ILLNESS:   History was obtained from chart review and the patient. Mr. Rosalinda Haynes is a 49 y. o. white gentleman with known history of COPD and sleep apnea has been having increasing shortness of breath lately but worse in the past 1 day. Evaluation for pulmonary embolism was negative. Patient has worsening shortness of breath requiring noninvasive ventilation. When I saw the patient, he was on noninvasive ventilation and appears comfortable without using multiple accessory musculature for exhalation  Denied much of cough or sputum production  No hemoptysis  No orthopnea or PND  Has shortness of breath with wheezing, improving with medications  No chest pain or pressure  No increasing pedal edema  Patient is a smoker and promises to quit  Also with known sleep apnea but never had used any machine but now willing to use the CPAP machine          Past Medical History:        Diagnosis Date    COPD (chronic obstructive pulmonary disease) ([facility] [address]     Diabetes mellitus ([facility] [address]        Past Surgical History:  History reviewed. No pertinent surgical history. Allergies:    No Known Allergies      Home Meds:   Prior to Admission medications    Medication Sig Start Date End Date Taking?  Authorizing Provider   albuterol (PROVENTIL) (2.5 MG/3ML) 0.083% nebulizer solution USE 1 VIAL IN NEBULIZER EVERY 6 HOURS AS NEEDED FOR WHEEZING FOR SHORTNESS OF BREATH 2/22/21 Yes Historical Provider, MD   apixaban (ELIQUIS) 5 MG TABS tablet Take 1 tablet by mouth twice daily 3/29/21  Yes Historical Provider, MD   budesonide-formoterol (SYMBICORT) 160-4.5 MCG/ACT AERO Inhale 2 puffs into the lungs 2 times daily 10/8/20  Yes Historical Provider, MD   furosemide (LASIX) 40 MG tablet Take 1 tablet by mouth once daily 2/28/21  Yes Historical Provider, MD   hydrOXYzine (VISTARIL) 25 MG capsule TAKE 1 CAPSULE BY MOUTH EVERY 8 HOURS AS NEEDED FOR ANXIETY 5/6/21  Yes Historical Provider, MD   pregabalin (LYRICA) 50 MG capsule Take 50 mg by mouth 3 times daily. 4/27/21  Yes Historical Provider, MD   traMADol (ULTRAM) 50 MG tablet TAKE 1 TABLET BY MOUTH ONCE DAILY AS NEEDED FOR PAIN 4/22/21  Yes Historical Provider, MD   zolpidem (AMBIEN) 10 MG tablet Take 10 mg by mouth nightly as needed. 4/27/21  Yes Historical Provider, MD   gabapentin (NEURONTIN) 400 MG capsule Take 400 mg by mouth 2 times daily. Yes Historical Provider, MD   hydrOXYzine (ATARAX) 25 MG tablet Take 25 mg by mouth every 8 hours as needed for Itching   Yes Historical Provider, MD   gabapentin (NEURONTIN) 600 MG tablet Take 600 mg by mouth daily. Every night   Yes Historical Provider, MD       Family History:       Problem Relation Age of Onset    Lung Cancer Mother          Social History:   TOBACCO:   reports that he has been smoking cigarettes. He has a 25.00 pack-year smoking history. He has never used smokeless tobacco.  ETOH:   reports previous alcohol use. DRUGS:  reports previous drug use. OCCUPATION:   There  is not history of TB or TB exposure. There is not asbestos or silica dust exposure. The patient reports does not have coal, foundry, quarry or [employer] exposure. Travel history reveals nothing of significance. There is not  history of recreational or IV drug use. There is not hot tube exposure. The patient does not have pets, dogs, cats turtles or exotic birds.            REVIEW OF SYSTEMS:    Review of Systems -   General ROS: Completed and except as mentioned above were negative   Psychological ROS:  Completed and except as mentioned above were negative  Ophthalmic ROS:  Completed and except as mentioned above were negative  ENT ROS:  Completed and except as mentioned above were negative  Allergy and Immunology ROS:  Completed and except as mentioned above were negative  Hematological and Lymphatic ROS:  Completed and except as mentioned above were negative  Endocrine ROS: Completed and except as mentioned above were negative  Breast ROS:  Completed and except as mentioned above were negative  Respiratory ROS:  Completed and except as mentioned above were negative  Cardiovascular ROS:  Completed and except as mentioned above were negative  Gastrointestinal ROS: Completed and except as mentioned above were negative  Genito-Urinary ROS:  Completed and except as mentioned above were negative  Musculoskeletal ROS:  Completed and except as mentioned above were negative  Neurological ROS:  Completed and except as mentioned above were negative  Dermatological ROS:  Completed and except as mentioned above were negative          Physical Exam:    Vitals: /77   Pulse 79   Temp 98.1 °F (36.7 °C) (Oral)   Resp 20   Ht 6' 3\" (1.905 m)   Wt (!) 335 lb 4.8 oz (152.1 kg)   SpO2 96%   BMI 41.91 kg/m²     Last Body weight:   Wt Readings from Last 3 Encounters:   05/09/21 (!) 335 lb 4.8 oz (152.1 kg)       Body Mass Index : Body mass index is 41.91 kg/m².       Intake and Output summary:     Intake/Output Summary (Last 24 hours) at 5/9/2021 1350  Last data filed at 5/9/2021 1000  Gross per 24 hour   Intake --   Output 700 ml   Net -700 ml       Physical Examination:   PHYSICAL EXAMINATION:  Vitals:    05/09/21 0645 05/09/21 0835 05/09/21 1050 05/09/21 1143   BP: 124/77      Pulse: 79      Resp: 16 20 23 20   Temp: 98.1 °F (36.7 °C)      TempSrc: Oral      SpO2: 95% 94%  96%   Weight: (!) 335 lb 4.8 oz (152.1 kg) Height: 6' 3\" (1.905 m)        Constitutional: This is a well developed, well nourished, Greater than 40 - Morbid Obesity / Extreme Obesity / Grade III 49y.o. year old male who is alert, oriented, cooperative and in no apparent distress. Head:normocephalic and atraumatic. EENT:   YOLETTE. No conjunctival injections. Septum was midline, mucosa was without erythema, exudates or cobblestoning. No thrush was noted. Mallampati III (soft palate, base of uvula visible)  Neck: Supple without thyromegaly. No elevated JVP. Trachea was midline. Respiratory: Chest was symmetrical without dullness to percussion. Breath sounds bilaterally were clear to auscultation. There were no wheezes, rhonchi or rales. There is no intercostal retraction or use of accessory muscles. No egophony noted. Patient is on the BiPAP  Cardiovascular: Regular without murmur, clicks, gallops or rubs. Abdomen: Slightly rounded and soft without organomegaly. No rebound tenderness, rigidity or guarding was appreciated. Lymphatic: No lymphadenopathy. Musculoskeletal: Normal curvature of the spine. No gross muscle weakness. Extremities:  No lower extremity edema, ulcerations, tenderness, varicosities or erythema. Muscle size, tone and strength are normal.  No involuntary movements are noted. Skin:  Warm and dry. Good color, turgor and pigmentation. No lesions or scars. No cyanosis or clubbing  Neurological/Psychiatric: The patient's general behavior, level of consciousness, thought content and emotional status is normal.            Laboratory findings:-    CBC:   Recent Labs     05/09/21  0338   WBC 7.8   HGB 15.3        BMP:    Recent Labs     05/09/21  0239      K 4.4      CO2 24   BUN 15   CREATININE 1.00   GLUCOSE 113*     S. Calcium:  Recent Labs     05/09/21  0239   CALCIUM 9.5     S. Ionized Calcium:No results for input(s): IONCA in the last 72 hours.   S. Magnesium:  Recent Labs     05/09/21  0239   MG 2.2     S. Phosphorus:No results for input(s): PHOS in the last 72 hours. S. Glucose:  Recent Labs     05/09/21  0810 05/09/21  1112   POCGLU 174* 274*     Glycosylated hemoglobin A1C: No results for input(s): LABA1C in the last 72 hours. INR: No results for input(s): INR in the last 72 hours. Hepatic functions:   Recent Labs     05/09/21  0239   ALKPHOS 156*   ALT 11   AST 19   PROT 7.2   BILITOT 0.52   LABALBU 3.7     Pancreatic functions:No results for input(s): LACTA, AMYLASE in the last 72 hours. S. Lactic Acid: No results for input(s): LACTA in the last 72 hours. Cardiac enzymes:  Recent Labs     05/09/21  0256   TROPONINI 0.03     BNP:No results for input(s): BNP in the last 72 hours. Lipid profile: No results for input(s): CHOL, TRIG, HDL, LDLCALC in the last 72 hours. Invalid input(s): LDL  Blood Gases: No results found for: PH, PCO2, PO2, HCO3, O2SAT  Thyroid functions: No results found for: TSH         Microbiology:    Cultures during this admission:     Blood cultures:      [] None drawn      [] Negative             []  Positive (Details:  )  Urine Culture:        [] None drawn      [] Negative             []  Positive (Details:  )  Sputum Culture:   [] None drawn       [] Negative             []  Positive (Details:  )     Pulmonary function tests: None    Radiological reports:    CXR: REVIEWED:    Result Date: 5/9/2021  No acute process. Ct Chest Pulmonary Embolism W Contrast    Result Date: 5/9/2021  No evidence of central and proximal pulmonary embolus but the exam is quite limited as pulmonary arterial opacification is poor. Cholelithiasis without evidence of cholecystitis. Assessment and Plan       IMPRESSION:   1.  COPD exacerbation (HCC)        Principal Problem:    Chronic obstructive pulmonary disease with acute exacerbation (HCC)  Active Problems:    Diabetes mellitus type 2, controlled ([facility] [address]    GERD (gastroesophageal reflux disease)    Sleep apnea    Tobacco abuse Hypoxia  Resolved Problems:    * No resolved hospital problems. *  Acute hypoxemic respiratory failure secondary to COPD exacerbation along with sleep apnea and possible obesity hypoventilation syndrome    PLAN:       Continue noninvasive ventilation  Corticosteroids  Bronchodilators  Patient is on Eliquis  Patient is on Pepcid  Patient was recommended to have prednisone and an antibiotic available for use during an exacerbation  Educated and clarified the medication use. Discussed use, benefit, and side effects of prescribed medications. Barriers to medication compliance addressed. Fernanda Matthews received counseling on the following healthy behaviors: nutrition, exercise and medication adherence  Recommend flu vaccination in the fall annually. Recommendations given regarding pneumococcal vaccinations. Maintain an active lifestyle. recommend smoking cessation. Fernanda Matthews was instructed on smoking cessation for greater than 10 minutes. I discussed with this patient today the risks and benefits of various tobacco cessation strategies  Recommend pulmonary rehabilitation. Patient was explained importance of pulmonary rehabilitation with regards to decreasing the hospitalization risk and also help with his dyspnea  Patient was educated on how to use the respiratory medications. All the questions that the patient has had were answered to   his satisfaction. Home O2 evaluation will be done prior to discharge  Supplemental oxygen  Will most likely be needed   Pulmonary function tests were not available  Chest x-ray was reviewed and discussed. CT scan of the chest was reviewed and discussed  After reviewing the patient's smoking history and his age patient does not meet the criteria for lung cancer screening. Polysomnogram with CPAP/BiPAP titration if needed as an outpatient. Weight loss was recommended and discussed. Recommended following good sleep hygiene instructions.   Explained importance of compliance with treatment of sleep apnea. Pt is not to drive if sleepy. Thank you for having us involved in the care of your patient. Please call us if you have any questions or concerns.         Fernando Sinclair M.D.            5/9/2021, 1:50 PM

## 2021-05-09 NOTE — PLAN OF CARE
Problem: Pain:  Description: Pain management should include both nonpharmacologic and pharmacologic interventions.   Goal: Pain level will decrease  Description: Pain level will decrease  5/9/2021 1020 by Los Beck RN  Outcome: Ongoing  5/9/2021 0820 by Joseline Serrato  Outcome: Ongoing  Goal: Control of acute pain  Description: Control of acute pain  5/9/2021 1020 by Los Beck RN  Outcome: Ongoing  5/9/2021 0820 by Joseline Serrato  Outcome: Ongoing  Goal: Control of chronic pain  Description: Control of chronic pain  5/9/2021 1020 by Los Beck RN  Outcome: Ongoing  5/9/2021 0820 by Joseline Serrato  Outcome: Ongoing  Goal: Patient's pain/discomfort is manageable  Description: Patient's pain/discomfort is manageable  5/9/2021 1020 by Los Beck RN  Outcome: Ongoing  5/9/2021 0820 by Joseline Serrato  Outcome: Ongoing     Problem: Skin Integrity:  Goal: Will show no infection signs and symptoms  Description: Will show no infection signs and symptoms  5/9/2021 1020 by Los Beck RN  Outcome: Ongoing  5/9/2021 0820 by Joseline Serrato  Outcome: Ongoing  Goal: Absence of new skin breakdown  Description: Absence of new skin breakdown  5/9/2021 1020 by Los Beck RN  Outcome: Ongoing  5/9/2021 0820 by Joseline Serrato  Outcome: Ongoing  Goal: Risk for impaired skin integrity will decrease  Description: Risk for impaired skin integrity will decrease  Outcome: Ongoing     Problem: Falls - Risk of:  Goal: Will remain free from falls  Description: Will remain free from falls  5/9/2021 1020 by Los Beck RN  Outcome: Ongoing  5/9/2021 0820 by Joseline Serrato  Outcome: Ongoing  Goal: Absence of physical injury  Description: Absence of physical injury  5/9/2021 1020 by Los Beck RN  Outcome: Ongoing  5/9/2021 0820 by Joseline Serrato  Outcome: Ongoing     Problem: Infection:  Goal: Will remain free from infection  Description: Will remain free from infection  5/9/2021 1020 by Los Beck RN  Outcome: Ongoing  5/9/2021 0820 by Liebertha Burn  Outcome: Ongoing     Problem: Safety:  Goal: Free from accidental physical injury  Description: Free from accidental physical injury  5/9/2021 1020 by Alondra Cramer RN  Outcome: Ongoing  5/9/2021 0820 by Liebertha Burn  Outcome: Ongoing  Goal: Free from intentional harm  Description: Free from intentional harm  5/9/2021 1020 by Alondra Cramer RN  Outcome: Ongoing  5/9/2021 0820 by  Burn  Outcome: Ongoing     Problem: Daily Care:  Goal: Daily care needs are met  Description: Daily care needs are met  5/9/2021 1020 by Alondra Cramer RN  Outcome: Ongoing  5/9/2021 0820 by  Burn  Outcome: Ongoing     Problem: Skin Integrity:  Goal: Skin integrity will stabilize  Description: Skin integrity will stabilize  5/9/2021 1020 by Alondra Cramer RN  Outcome: Ongoing  5/9/2021 0820 by  Burn  Outcome: Ongoing     Problem: Discharge Planning:  Goal: Patients continuum of care needs are met  Description: Patients continuum of care needs are met  5/9/2021 1020 by Alondra Cramer RN  Outcome: Ongoing  5/9/2021 0820 by  Burn  Outcome: Ongoing     Problem: Breathing Pattern - Ineffective:  Goal: Ability to achieve and maintain a regular respiratory rate will improve  Description: Ability to achieve and maintain a regular respiratory rate will improve  5/9/2021 1020 by Alondra Cramer RN  Outcome: Ongoing  5/9/2021 0820 by  Burn  Outcome: Ongoing     Problem: Respiratory:  Goal: Ability to maintain a clear airway will improve  Description: Ability to maintain a clear airway will improve  Outcome: Ongoing  Goal: Ability to maintain adequate ventilation will improve  Description: Ability to maintain adequate ventilation will improve  Outcome: Ongoing     Problem:  Activity:  Goal: Risk for activity intolerance will decrease  Description: Risk for activity intolerance will decrease  Outcome: Ongoing     Problem: Coping:  Goal: Ability to adjust to condition or change in health will improve  Description: Ability to adjust to condition or change in health will improve  Outcome: Ongoing     Problem: Fluid Volume:  Goal: Ability to maintain a balanced intake and output will improve  Description: Ability to maintain a balanced intake and output will improve  Outcome: Ongoing     Problem: Health Behavior:  Goal: Ability to identify and utilize available resources and services will improve  Description: Ability to identify and utilize available resources and services will improve  Outcome: Ongoing  Goal: Ability to manage health-related needs will improve  Description: Ability to manage health-related needs will improve  Outcome: Ongoing     Problem: Metabolic:  Goal: Ability to maintain appropriate glucose levels will improve  Description: Ability to maintain appropriate glucose levels will improve  Outcome: Ongoing     Problem: Nutritional:  Goal: Maintenance of adequate nutrition will improve  Description: Maintenance of adequate nutrition will improve  Outcome: Ongoing  Goal: Progress toward achieving an optimal weight will improve  Description: Progress toward achieving an optimal weight will improve  Outcome: Ongoing     Problem: Tissue Perfusion:  Goal: Adequacy of tissue perfusion will improve  Description: Adequacy of tissue perfusion will improve  Outcome: Ongoing

## 2021-05-09 NOTE — PROGRESS NOTES
Patient is resting on cot. Respirations easy and unlabored. Patient updated on plan of care. Patients aeration has increased.

## 2021-05-09 NOTE — PROGRESS NOTES
I was called and asked to evaluate the patient related to shortness of breath and stating he feels like he cannot breathe. When out of the room he is slightly tachypneic and tearful. Tells me he feels like he cannot breathe. Lung sounds are diminished. I requested respiratory place the patient on BiPAP. Ordered Ativan 0.5 mg IV to help with anxiety and consulted pulmonary medicine. I also requested they move the patient into our ICU room 324 for closer observation. After placing the patient on BiPAP he immediately says he feels a little better. And to continue to monitor. Due to the immediate potential for life-threatening deterioration due to respiratory disctress , I spent >30 minutes providing critical care. This time is excluding time spent performing procedures.

## 2021-05-09 NOTE — H&P
[facility]  Office: Karina Huddleston, DO, Ida Cullen, DO, Giselle Bates, DO, Stefania Pizarro Blood, DO, Dl Heath MD, Hunter Fonseca MD, Gaudencio Metz MD, Nilton Lopez MD, Laya Stephenson MD, Loi Garza MD, Manfred Cota MD, Wandy Parnell MD, Yahaira Sahu, DO, Adelina Donaldson MD, Carla Greer, DO, Josh Anderson MD,  Jer Ortega DO, Farhad Noel MD, Victoria Cole MD, Sorin Anglin MD, Odalys Godinez MD, Roula Medrano, [facility], [facility], CNP, Queenie Claros, CNP, Miguel Cordoba, CNS, Cady Zhang, CNP, Taniya Sheehan, CNP, Emely Lopez, CNP, Evelyn Shannon, CNP, Felecia Velazquez, CNP, Andrews Smith PA-C, Brook Khoury, [facility], Tiffanie Taylor, CNP, Keon Alfonso, [facility], Dilshad Raymundo, CNP, Amy Cespedes, CNP, Orlando Zavala, CNP, Marcelle Kimble, [facility]   [address] [facility]    HISTORY AND PHYSICAL EXAMINATION            Date:   5/9/2021  Patient name:  Romy Arechiga  Date of admission:  5/9/2021  2:24 AM  MRN:   [id number]  Account:  [de-identified]  YOB: 1965  PCP:    DANIEL Sanchez CNP  Room:   94/284-65  Code Status:    Full Code    Chief Complaint:     SOB    History Obtained From:     patient    History of Present Illness:     Romy Arechiga is a 49 y.o. Non-/non  male who presents with No chief complaint on file. and is admitted to the hospital for the management of Chronic obstructive pulmonary disease with acute exacerbation ([facility] [address] Patient states he woke up abruptly short of breath last night. Said his chest and lungs felt tight. States he has had problems with shortness of breath and COPD for years but last night it was worse. He reports noticing some wheezing but he denies cough, fever, chills or nausea and vomiting. He also reports chronic lower leg edema.   He does not follow with a pulmonologist.  According to the chart he has a history of COPD, clotting disorder with history of DVT, long-term anticoagulation, diabetes, GERD, sleep apnea and tobacco use. The patient himself is not a good historian he says he may or may not have had a sleep study but he does not wear a CPAP and is not sure if he is on any blood thinners. I called his sister who does confirm that he takes Eliquis. BMP and CBC are unremarkable. BNP is 95  CT shows no evidence of central or proximal pulmonary embolus but the exam is quite limited as pulmonary arterial opacification is poor. Per documentation he was hypoxic at 88% and placed on 2 L nasal cannula while in the ER    Solu-Medrol and duo nebs ordered  Continuing home Symbicort and Eliquis      Past Medical History:     Past Medical History:   Diagnosis Date    COPD (chronic obstructive pulmonary disease) ([facility] [address]     Diabetes mellitus ([facility] [address]         Past Surgical History:     History reviewed. No pertinent surgical history. Medications Prior to Admission:     Prior to Admission medications    Medication Sig Start Date End Date Taking? Authorizing Provider   albuterol (PROVENTIL) (2.5 MG/3ML) 0.083% nebulizer solution USE 1 VIAL IN NEBULIZER EVERY 6 HOURS AS NEEDED FOR WHEEZING FOR SHORTNESS OF BREATH 2/22/21  Yes Historical Provider, MD   apixaban (ELIQUIS) 5 MG TABS tablet Take 1 tablet by mouth twice daily 3/29/21  Yes Historical Provider, MD   budesonide-formoterol (SYMBICORT) 160-4.5 MCG/ACT AERO Inhale 2 puffs into the lungs 2 times daily 10/8/20  Yes Historical Provider, MD   furosemide (LASIX) 40 MG tablet Take 1 tablet by mouth once daily 2/28/21  Yes Historical Provider, MD   hydrOXYzine (VISTARIL) 25 MG capsule TAKE 1 CAPSULE BY MOUTH EVERY 8 HOURS AS NEEDED FOR ANXIETY 5/6/21  Yes Historical Provider, MD   pregabalin (LYRICA) 50 MG capsule Take 50 mg by mouth 3 times daily.  4/27/21  Yes Historical Provider, MD   traMADol (ULTRAM) 50 MG tablet TAKE 1 TABLET BY MOUTH ONCE DAILY AS NEEDED FOR PAIN 4/22/21  Yes Historical Provider, MD hours ending 21 1032    Physical Exam  Vitals signs and nursing note reviewed. HENT:      Mouth/Throat:      Mouth: Mucous membranes are moist.   Eyes:      Extraocular Movements: Extraocular movements intact. Neck:      Musculoskeletal: Neck supple. Cardiovascular:      Rate and Rhythm: Normal rate and regular rhythm. Pulses: Normal pulses. Heart sounds: Normal heart sounds. Pulmonary:      Effort: Pulmonary effort is normal.      Breath sounds: Decreased air movement present. Examination of the right-upper field reveals decreased breath sounds and wheezing. Examination of the left-upper field reveals decreased breath sounds. Examination of the right-middle field reveals decreased breath sounds and wheezing. Examination of the left-middle field reveals decreased breath sounds. Examination of the right-lower field reveals decreased breath sounds and wheezing. Examination of the left-lower field reveals decreased breath sounds. Decreased breath sounds and wheezing present. Abdominal:      General: Bowel sounds are normal.      Palpations: Abdomen is soft. Musculoskeletal:      Right lower le+ Edema present. Left lower le+ Edema present. Skin:     General: Skin is warm and dry. Capillary Refill: Capillary refill takes less than 2 seconds. Comments: Chronic vascular discoloration to the lower extremites   Neurological:      Mental Status: He is alert and oriented to person, place, and time.          Investigations:      Laboratory Testing:  Recent Results (from the past 24 hour(s))   Troponin    Collection Time: 21  2:39 AM   Result Value Ref Range    Troponin, High Sensitivity 15 0 - 22 ng/L    Troponin T NOT REPORTED <0.03 ng/mL    Troponin Interp NOT REPORTED    Comprehensive Metabolic Panel    Collection Time: 21  2:39 AM   Result Value Ref Range    Glucose 113 (H) 70 - 99 mg/dL    BUN 15 6 - 20 mg/dL    CREATININE 1.00 0.70 - 1.20 mg/dL    Bun/Cre Ratio NOT REPORTED 9 - 20    Calcium 9.5 8.6 - 10.4 mg/dL    Sodium 143 135 - 144 mmol/L    Potassium 4.4 3.7 - 5.3 mmol/L    Chloride 105 98 - 107 mmol/L    CO2 24 20 - 31 mmol/L    Anion Gap 14 9 - 17 mmol/L    Alkaline Phosphatase 156 (H) 40 - 129 U/L    ALT 11 5 - 41 U/L    AST 19 <40 U/L    Total Bilirubin 0.52 0.3 - 1.2 mg/dL    Total Protein 7.2 6.4 - 8.3 g/dL    Albumin 3.7 3.5 - 5.2 g/dL    Albumin/Globulin Ratio 1.1 1.0 - 2.5    GFR Non-African American >60 >60 mL/min    GFR African American >60 >60 mL/min    GFR Comment          GFR Staging NOT REPORTED    Magnesium    Collection Time: 05/09/21  2:39 AM   Result Value Ref Range    Magnesium 2.2 1.6 - 2.6 mg/dL   Lactate, Sepsis    Collection Time: 05/09/21  2:39 AM   Result Value Ref Range    Lactic Acid, Sepsis 1.4 0.5 - 1.9 mmol/L    Lactic Acid, Sepsis, Whole Blood NOT REPORTED 0.5 - 1.9 mmol/L   Lipase    Collection Time: 05/09/21  2:39 AM   Result Value Ref Range    Lipase 26 13 - 60 U/L   D-Dimer, Quantitative    Collection Time: 05/09/21  2:39 AM   Result Value Ref Range    D-Dimer, Quant DISREGARD RESULT. SPECIMEN CLOTTED. mg/L FEU   Brain Natriuretic Peptide    Collection Time: 05/09/21  2:39 AM   Result Value Ref Range    Pro-BNP 95 <300 pg/mL    BNP Interpretation Pro-BNP Reference Range:    SPECIMEN REJECTION    Collection Time: 05/09/21  2:39 AM   Result Value Ref Range    Specimen Source . BLOOD     Ordered Test DIME CDP     Reason for Rejection Unable to perform testing: Specimen clotted.      - NOT REPORTED    POCT troponin    Collection Time: 05/09/21  2:56 AM   Result Value Ref Range    POC Troponin I 0.03 0.00 - 0.10 ng/mL    POC Troponin Interp         EKG 12 Lead    Collection Time: 05/09/21  3:10 AM   Result Value Ref Range    Ventricular Rate 111 BPM    Atrial Rate 111 BPM    P-R Interval 178 ms    QRS Duration 88 ms    Q-T Interval 324 ms    QTc Calculation (Bazett) 440 ms    P Axis 78 degrees    R Axis -37 degrees    T Axis 67 proximal pulmonary embolus but the exam is quite limited as pulmonary arterial opacification is poor. Cholelithiasis without evidence of cholecystitis. Assessment :      Hospital Problems           Last Modified POA    * (Principal) Chronic obstructive pulmonary disease with acute exacerbation ([facility] [address] 5/9/2021 Yes    Diabetes mellitus type 2, controlled ([facility] [address] 5/9/2021 Yes    GERD (gastroesophageal reflux disease) 5/9/2021 Yes    Sleep apnea 5/9/2021 Yes    Tobacco abuse 5/9/2021 Yes    COPD exacerbation ([facility] [address] 5/9/2021 Yes    Hypoxia 5/9/2021 Yes          Plan:     Patient status observation in the Med/Surge    COPD with acute exacerbation and hypoxia; Solu-Medrol, DuoNeb, PPI, oxygen to maintain sat greater than 90%, resume home Symbicort. Monitor vitals including pulse ox    Type 2 diabetes; insulin sliding scale and check A1c    Tobacco abuse; encourage cessation of tobacco and offer nicotine patch    Continue home Lyrica    Repeat BMP and CBC in a.m.     Low-carb diet    Patient encouraged to follow through with outpatient sleep study to assess for ROSEY    Consultations:   None        DANIEL Irvin CNP  5/9/2021  10:32 AM    Copy sent to DANIEL Araujo - ASHA

## 2021-05-09 NOTE — PROGRESS NOTES
RT in to administer Aerosol therapy . Pt has remained on the Bipap unit prior to tx . Good mask tolerated . Breath sounds remains unchanged diminished with faint expiratory wheezes . Congested non productive present . Pt off machine for a break to eat lunch , will resume as need ed . 2lpm of nasal 02 was reapplied .

## 2021-05-09 NOTE — PLAN OF CARE
Problem: Pain:  Description: Pain management should include both nonpharmacologic and pharmacologic interventions. Goal: Pain level will decrease  Description: Pain level will decrease  Outcome: Ongoing  Goal: Control of acute pain  Description: Control of acute pain  Outcome: Ongoing  Goal: Control of chronic pain  Description: Control of chronic pain  Outcome: Ongoing  Goal: Patient's pain/discomfort is manageable  Description: Patient's pain/discomfort is manageable  Outcome: Ongoing    : Will assess for pain throughout shift and administer pain medication as needed      Problem: Skin Integrity:  Goal: Will show no infection signs and symptoms  Description: Will show no infection signs and symptoms  Outcome: Ongoing  Goal: Absence of new skin breakdown  Description: Absence of new skin breakdown  Outcome: Ongoing    : Assessed patient's skin; will help ensure skin remains intact. Will assess. Problem: Falls - Risk of:  Goal: Will remain free from falls  Description: Will remain free from falls  Outcome: Ongoing  Goal: Absence of physical injury  Description: Absence of physical injury  Outcome: Ongoing    : Siderails up x 2  Hourly rounding  Call light in reach  Instructed to call for assist before attempting out of bed. Remains free from falls and accidental injury at this time   Floor free from obstacles  Bed is locked and in lowest position  Adequate lighting provided     Problem: Infection:  Goal: Will remain free from infection  Description: Will remain free from infection  Outcome: Ongoing    : Will frequently check for signs and symptoms of infection and address accordingly      Problem: Safety:  Goal: Free from accidental physical injury  Description: Free from accidental physical injury  Outcome: Ongoing  Goal: Free from intentional harm  Description: Free from intentional harm  Outcome: Ongoing    :  Will ensure the safety of the patient and do what is in their best interest in conjunction with their care plan. Problem: Daily Care:  Goal: Daily care needs are met  Description: Daily care needs are met  Outcome: Ongoing    : Will assist patient with their ADLs as needed      Problem: Discharge Planning:  Goal: Patients continuum of care needs are met  Description: Patients continuum of care needs are met  Outcome: Ongoing    : Will ask open-ended questions to assess patient's understanding of plan of care. Problem: Breathing Pattern - Ineffective:  Goal: Ability to achieve and maintain a regular respiratory rate will improve  Description: Ability to achieve and maintain a regular respiratory rate will improve  Outcome: Ongoing    : Will monitor SpO2, lung sounds, and will notify RT when necessary.     : Patient is in bed with no needs at this time. Call light is within reach.  Will continue to monitor and assess hourly/PRN

## 2021-05-10 LAB
ANION GAP SERPL CALCULATED.3IONS-SCNC: 11 MMOL/L (ref 9–17)
BUN BLDV-MCNC: 22 MG/DL (ref 6–20)
BUN/CREAT BLD: ABNORMAL (ref 9–20)
CALCIUM SERPL-MCNC: 9.6 MG/DL (ref 8.6–10.4)
CHLORIDE BLD-SCNC: 108 MMOL/L (ref 98–107)
CO2: 22 MMOL/L (ref 20–31)
CREAT SERPL-MCNC: 1.02 MG/DL (ref 0.7–1.2)
EKG ATRIAL RATE: 111 BPM
EKG P AXIS: 78 DEGREES
EKG P-R INTERVAL: 178 MS
EKG Q-T INTERVAL: 324 MS
EKG QRS DURATION: 88 MS
EKG QTC CALCULATION (BAZETT): 440 MS
EKG R AXIS: -37 DEGREES
EKG T AXIS: 67 DEGREES
EKG VENTRICULAR RATE: 111 BPM
ESTIMATED AVERAGE GLUCOSE: 114 MG/DL
GFR AFRICAN AMERICAN: >60 ML/MIN
GFR NON-AFRICAN AMERICAN: >60 ML/MIN
GFR SERPL CREATININE-BSD FRML MDRD: ABNORMAL ML/MIN/{1.73_M2}
GFR SERPL CREATININE-BSD FRML MDRD: ABNORMAL ML/MIN/{1.73_M2}
GLUCOSE BLD-MCNC: 177 MG/DL (ref 70–99)
GLUCOSE BLD-MCNC: 198 MG/DL (ref 75–110)
GLUCOSE BLD-MCNC: 324 MG/DL (ref 75–110)
GLUCOSE BLD-MCNC: 351 MG/DL (ref 75–110)
HBA1C MFR BLD: 5.6 % (ref 4–6)
HCT VFR BLD CALC: 43.8 % (ref 41–53)
HEMOGLOBIN: 13.9 G/DL (ref 13.5–17.5)
LV EF: 65 %
LVEF MODALITY: NORMAL
MCH RBC QN AUTO: 29 PG (ref 26–34)
MCHC RBC AUTO-ENTMCNC: 31.8 G/DL (ref 31–37)
MCV RBC AUTO: 91.1 FL (ref 80–100)
NRBC AUTOMATED: ABNORMAL PER 100 WBC
PDW BLD-RTO: 17.2 % (ref 12.5–15.4)
PLATELET # BLD: 204 K/UL (ref 140–450)
PMV BLD AUTO: 9.8 FL (ref 6–12)
POTASSIUM SERPL-SCNC: 4.5 MMOL/L (ref 3.7–5.3)
RBC # BLD: 4.81 M/UL (ref 4.5–5.9)
SODIUM BLD-SCNC: 141 MMOL/L (ref 135–144)
WBC # BLD: 15.1 K/UL (ref 3.5–11)

## 2021-05-10 PROCEDURE — 6360000002 HC RX W HCPCS: Performed by: NURSE PRACTITIONER

## 2021-05-10 PROCEDURE — 2580000003 HC RX 258: Performed by: NURSE PRACTITIONER

## 2021-05-10 PROCEDURE — 94660 CPAP INITIATION&MGMT: CPT

## 2021-05-10 PROCEDURE — 6370000000 HC RX 637 (ALT 250 FOR IP): Performed by: NURSE PRACTITIONER

## 2021-05-10 PROCEDURE — 85027 COMPLETE CBC AUTOMATED: CPT

## 2021-05-10 PROCEDURE — 36415 COLL VENOUS BLD VENIPUNCTURE: CPT

## 2021-05-10 PROCEDURE — 94761 N-INVAS EAR/PLS OXIMETRY MLT: CPT

## 2021-05-10 PROCEDURE — 2700000000 HC OXYGEN THERAPY PER DAY

## 2021-05-10 PROCEDURE — 94640 AIRWAY INHALATION TREATMENT: CPT

## 2021-05-10 PROCEDURE — 97530 THERAPEUTIC ACTIVITIES: CPT

## 2021-05-10 PROCEDURE — 80048 BASIC METABOLIC PNL TOTAL CA: CPT

## 2021-05-10 PROCEDURE — 99232 SBSQ HOSP IP/OBS MODERATE 35: CPT | Performed by: INTERNAL MEDICINE

## 2021-05-10 PROCEDURE — APPSS180 APP SPLIT SHARED TIME > 60 MINUTES: Performed by: NURSE PRACTITIONER

## 2021-05-10 PROCEDURE — 93306 TTE W/DOPPLER COMPLETE: CPT

## 2021-05-10 PROCEDURE — 87070 CULTURE OTHR SPECIMN AEROBIC: CPT

## 2021-05-10 PROCEDURE — 97535 SELF CARE MNGMENT TRAINING: CPT

## 2021-05-10 PROCEDURE — 82947 ASSAY GLUCOSE BLOOD QUANT: CPT

## 2021-05-10 PROCEDURE — 97166 OT EVAL MOD COMPLEX 45 MIN: CPT

## 2021-05-10 PROCEDURE — 2060000000 HC ICU INTERMEDIATE R&B

## 2021-05-10 PROCEDURE — 87205 SMEAR GRAM STAIN: CPT

## 2021-05-10 PROCEDURE — 97162 PT EVAL MOD COMPLEX 30 MIN: CPT

## 2021-05-10 RX ADMIN — IPRATROPIUM BROMIDE AND ALBUTEROL SULFATE 1 AMPULE: .5; 3 SOLUTION RESPIRATORY (INHALATION) at 12:33

## 2021-05-10 RX ADMIN — PREGABALIN 50 MG: 25 CAPSULE ORAL at 12:42

## 2021-05-10 RX ADMIN — PREGABALIN 50 MG: 25 CAPSULE ORAL at 07:59

## 2021-05-10 RX ADMIN — IPRATROPIUM BROMIDE AND ALBUTEROL SULFATE 1 AMPULE: .5; 3 SOLUTION RESPIRATORY (INHALATION) at 16:10

## 2021-05-10 RX ADMIN — INSULIN LISPRO 2 UNITS: 100 INJECTION, SOLUTION INTRAVENOUS; SUBCUTANEOUS at 07:59

## 2021-05-10 RX ADMIN — BUDESONIDE AND FORMOTEROL FUMARATE DIHYDRATE 2 PUFF: 160; 4.5 AEROSOL RESPIRATORY (INHALATION) at 19:48

## 2021-05-10 RX ADMIN — FAMOTIDINE 20 MG: 20 TABLET ORAL at 07:59

## 2021-05-10 RX ADMIN — SODIUM CHLORIDE, PRESERVATIVE FREE 10 ML: 5 INJECTION INTRAVENOUS at 20:45

## 2021-05-10 RX ADMIN — INSULIN LISPRO 8 UNITS: 100 INJECTION, SOLUTION INTRAVENOUS; SUBCUTANEOUS at 18:08

## 2021-05-10 RX ADMIN — FAMOTIDINE 20 MG: 20 TABLET ORAL at 20:45

## 2021-05-10 RX ADMIN — BUDESONIDE AND FORMOTEROL FUMARATE DIHYDRATE 2 PUFF: 160; 4.5 AEROSOL RESPIRATORY (INHALATION) at 12:33

## 2021-05-10 RX ADMIN — LORAZEPAM 0.5 MG: 2 INJECTION INTRAMUSCULAR; INTRAVENOUS at 22:25

## 2021-05-10 RX ADMIN — METHYLPREDNISOLONE SODIUM SUCCINATE 60 MG: 125 INJECTION, POWDER, FOR SOLUTION INTRAMUSCULAR; INTRAVENOUS at 18:08

## 2021-05-10 RX ADMIN — INSULIN LISPRO 2 UNITS: 100 INJECTION, SOLUTION INTRAVENOUS; SUBCUTANEOUS at 12:42

## 2021-05-10 RX ADMIN — METHYLPREDNISOLONE SODIUM SUCCINATE 60 MG: 125 INJECTION, POWDER, FOR SOLUTION INTRAMUSCULAR; INTRAVENOUS at 12:42

## 2021-05-10 RX ADMIN — INSULIN LISPRO 4 UNITS: 100 INJECTION, SOLUTION INTRAVENOUS; SUBCUTANEOUS at 20:44

## 2021-05-10 RX ADMIN — METHYLPREDNISOLONE SODIUM SUCCINATE 60 MG: 125 INJECTION, POWDER, FOR SOLUTION INTRAMUSCULAR; INTRAVENOUS at 06:34

## 2021-05-10 RX ADMIN — METHYLPREDNISOLONE SODIUM SUCCINATE 60 MG: 125 INJECTION, POWDER, FOR SOLUTION INTRAMUSCULAR; INTRAVENOUS at 00:33

## 2021-05-10 RX ADMIN — SODIUM CHLORIDE, PRESERVATIVE FREE 10 ML: 5 INJECTION INTRAVENOUS at 07:59

## 2021-05-10 RX ADMIN — IPRATROPIUM BROMIDE AND ALBUTEROL SULFATE 1 AMPULE: .5; 3 SOLUTION RESPIRATORY (INHALATION) at 19:48

## 2021-05-10 RX ADMIN — PREGABALIN 50 MG: 25 CAPSULE ORAL at 20:45

## 2021-05-10 RX ADMIN — IPRATROPIUM BROMIDE AND ALBUTEROL SULFATE 1 AMPULE: .5; 3 SOLUTION RESPIRATORY (INHALATION) at 09:30

## 2021-05-10 RX ADMIN — APIXABAN 5 MG: 5 TABLET, FILM COATED ORAL at 20:44

## 2021-05-10 RX ADMIN — APIXABAN 5 MG: 5 TABLET, FILM COATED ORAL at 07:59

## 2021-05-10 ASSESSMENT — ENCOUNTER SYMPTOMS
CONSTIPATION: 0
COUGH: 1
WHEEZING: 0
ABDOMINAL PAIN: 0
DIARRHEA: 0
BLOOD IN STOOL: 0
SHORTNESS OF BREATH: 0
NAUSEA: 0
VOMITING: 0
CHEST TIGHTNESS: 0

## 2021-05-10 ASSESSMENT — PAIN SCALES - GENERAL
PAINLEVEL_OUTOF10: 0

## 2021-05-10 NOTE — PROGRESS NOTES
Restriction  Other position/activity restrictions: \"Can increase activity level as tolerated as long as O2 saturation maintained above 92% and as tolerated. \"   Pt on 4L via nasal canula at therapist arrival, increased to 6L O2 per RN with activity, returned back to 4L O2 at end of session.      Subjective    General  Patient assessed for rehabilitation services?: Yes  Family / Caregiver Present: No  General Comment  Comments: RN ok'd eval this PM. Pt was pleasant and cooperative during session  Patient Currently in Pain: Denies    Social/Functional History  Social/Functional History  Lives With: Family  Type of Home: House  Home Layout: One level  Home Access: Stairs to enter without rails  Entrance Stairs - Number of Steps: 3  Bathroom Shower/Tub: Tub/Shower unit  Bathroom Toilet: Standard  Bathroom Equipment: Grab bars in shower, Shower chair, Toilet raiser  Bathroom Accessibility: Gaetana Scales accessible  Home Equipment: 4 wheeled walker  ADL Assistance: Independent  Homemaking Assistance: (Family performs)  Homemaking Responsibilities: No  Ambulation Assistance: Independent  Transfer Assistance: Independent  Active : No  Patient's  Info: sister or niece drive  Mode of Transportation: Family, [employer]  Occupation: Retired  Type of occupation:   Leisure & Hobbies: Enjoys working on cars       Objective   Vision: Impaired  Hearing: Within functional limits    Orientation  Overall Orientation Status: Within Functional Limits        Balance  Sitting Balance: Contact guard assistance(Pt seated EOB > toliet > recliner chair during sitting balance to promote trunk stability and pursed lip/deep breathing secondary to fatigue/SOB)  Standing Balance: Contact guard assistance  Time: ~45secs/1min  Activity: Pt performed standing balance during toileting task requiring increased/time effort secondary to fatigue/SOB    Functional Mobility  Functional - Mobility Device: Rolling Walker  Activity: To/from bathroom  Assist WFL(Pt denies numbness/tingling at this time)        LUE AROM : WFL  Left Hand AROM: WFL  RUE AROM : WFL  Right Hand AROM: WFL  LUE Strength  Gross LUE Strength: WFL  L Shoulder Flex: 4/5  L Elbow Flex: 4/5  L Hand General: 4/5  RUE Strength  Gross RUE Strength: WFL  R Shoulder Flex: 4/5  R Elbow Flex: 4/5  R Hand General: 4/5    Plan   Plan  Times per week: 5-6x/wk  Times per day: Daily  Current Treatment Recommendations: Balance Training, Functional Mobility Training, Endurance Training, Safety Education & Training, Self-Care / ADL, Home Management Training    AM-PAC Score  AM-PAC Inpatient Daily Activity Raw Score: 17 (05/10/21 1506)  AM-PAC Inpatient ADL T-Scale Score : 37.26 (05/10/21 1506)  ADL Inpatient CMS 0-100% Score: 50.11 (05/10/21 1506)  ADL Inpatient CMS G-Code Modifier : CK (05/10/21 1506)    Goals  Short term goals  Time Frame for Short term goals: 14 visits  Short term goal 1: Pt will demo functional mobility/transfers w/mod IND using LRD w/no LOB  Short term goal 2: Pt will demo 10mins of activity tolerance to increase engagement in ADL/functional tasks  Short term goal 3: Pt will demo LB ADLs w/mod IND use of AE/DME PRN  Short term goal 4: Pt will demo BUE strengthing HEP to increase participation in functional tasks/ADLs  Short term goal 5: Pt will independently demo good safety awareness throughout engagement in all ADLs/functional tasks  Short term goal 6: Pt will independently demo ability to incorporate energy conservation techniques as needed during engagement in all functional tasks        Therapy Time   Individual Concurrent Group Co-treatment   Time In 1356         Time Out 1431         Minutes 35         Timed Code Treatment Minutes: [address] [facility] S/OT

## 2021-05-10 NOTE — PROGRESS NOTES
[facility]  Office: [address], DO, Maria A Weston, DO, Shabana Pali, DO, Francoise Ibarra Blood, DO, Cuco Isabel MD, Gene Grossman MD, Alisson Noble MD, Lexi Solomon MD, Sindi Crawford MD, Joelle Kern MD, Kobi Hernandez MD, Annmarie Kirby MD, Todd Daugherty, DO, Shameka Rosado MD, Memo Bonner, DO, Nj Bush MD,  Pako Rivas, DO, Buffy Fowler MD, Lizzie Crigler, MD, Wilmar Young MD, Aspen Chamberlain MD, Jonny Vazquez, [facility], [facility] Jonatan, CNP, Mirian Trujillo, CNP, Serge Darby, [facility], Erik Allen, CNP, Mario Phillip, CNP, Miriam Mayberry, CNP, Reema Zhang, CNP, Steve Horn, CNP, Davidson Walden PA-C, Michele King, [facility], Chay Gomez, CNP, Fawad Brown, CNP, Mayco Lundberg, CNP, Silvestre Godinez, CNP, Wei Gifford, CNP, [id number]    Progress Note    5/10/2021    8:03 AM    Name:   Hudson Jennings  MRN:     [id number]     [place]:      [de-identified]   Room:   [address] [place] [address] Day:  1  Admit Date:  5/9/2021  2:24 AM    PCP:   DANIEL Galloway CNP  Code Status:  Full Code    Subjective:     C/C:sob    Interval History Status: improved. Not necessarily sob at rest but he hasn't done much. Planning to work with PT/OT today. May need home oxygen. Off BiPAP since last night. He says he has had 2 sleep studies but no one every got back with him about a machine. He has a productive cough but hasn't looked at what he coughs up. Currently on 4 L NC. He continues to have Wheezing t/o on assessment    VSS  Glucose 177-ISS started  Continue solumedrol  Leukocytosis related to steroids-continue to monitor for fever, chills  Wean oxygen as tolerated-may need home o2 evaluation    Brief History:     Hudson Jennings is a 49 y.o. Non-/non  male who presents with No chief complaint on file.    and is admitted to the hospital for the management of Chronic obstructive pulmonary disease with acute exacerbation ([facility] [address]    Patient states he woke up abruptly short of breath last night. Said his chest and lungs felt tight. States he has had problems with shortness of breath and COPD for years but last night it was worse. He reports noticing some wheezing but he denies cough, fever, chills or nausea and vomiting. He also reports chronic lower leg edema. He does not follow with a pulmonologist.  According to the chart he has a history of COPD, clotting disorder with history of DVT, long-term anticoagulation, diabetes, GERD, sleep apnea and tobacco use. The patient himself is not a good historian he says he may or may not have had a sleep study but he does not wear a CPAP and is not sure if he is on any blood thinners. I called his sister who does confirm that he takes Eliquis.     BMP and CBC are unremarkable. BNP is 95  CT shows no evidence of central or proximal pulmonary embolus but the exam is quite limited as pulmonary arterial opacification is poor. Per documentation he was hypoxic at 88% and placed on 2 L nasal cannula while in the ER     Solu-Medrol and duo nebs ordered  Continuing home Symbicort and Eliquis    On Sunday I was called and asked to evaluate the patient related to shortness of breath and stating he feels like he cannot breathe. When out of the room he is slightly tachypneic and tearful. Tells me he feels like he cannot breathe. Lung sounds are diminished. I requested respiratory place the patient on BiPAP. Ordered Ativan 0.5 mg IV to help with anxiety and consulted pulmonary medicine. I also requested they move the patient into our ICU room 324 for closer observation. After placing the patient on BiPAP he immediately says he feels a little better. Doing much better  Pulmonary following    Review of Systems:     Review of Systems   Constitutional: Negative for chills, diaphoresis and fever. HENT: Negative for congestion.     Eyes: Negative for visual disturbance. Respiratory: Positive for cough. Negative for chest tightness, shortness of breath and wheezing. Cardiovascular: Negative for chest pain, palpitations and leg swelling. Gastrointestinal: Negative for abdominal pain, blood in stool, constipation, diarrhea, nausea and vomiting. Genitourinary: Negative for difficulty urinating. Neurological: Negative for dizziness, weakness, light-headedness, numbness and headaches. All other systems reviewed and are negative. Medications: Allergies:  No Known Allergies    Current Meds:   Scheduled Meds:    sodium chloride flush  5-40 mL Intravenous 2 times per day    famotidine  20 mg Oral BID    ipratropium-albuterol  1 ampule Inhalation Q4H WA    methylPREDNISolone  60 mg Intravenous Q6H    Followed by   Esaw Demar ON 5/11/2021] predniSONE  40 mg Oral Daily    apixaban  5 mg Oral BID    budesonide-formoterol  2 puff Inhalation BID    pregabalin  50 mg Oral TID    insulin lispro  0-12 Units Subcutaneous TID WC    insulin lispro  0-6 Units Subcutaneous Nightly     Continuous Infusions:    sodium chloride      dextrose       PRN Meds: sodium chloride flush, sodium chloride flush, sodium chloride, promethazine **OR** ondansetron, nicotine, polyethylene glycol, acetaminophen **OR** acetaminophen, albuterol, glucose, dextrose, glucagon (rDNA), dextrose, traMADol, LORazepam, LORazepam    Data:     Past Medical History:   has a past medical history of COPD (chronic obstructive pulmonary disease) ([facility] [address] and Diabetes mellitus ([facility] Social History:   reports that he has been smoking cigarettes. He has a 25.00 pack-year smoking history. He has never used smokeless tobacco. He reports previous alcohol use. He reports previous drug use.      Family History:   Family History   Problem Relation Age of Onset    Lung Cancer Mother        Vitals:  /76   Pulse 75   Temp 97.7 °F (36.5 °C) (Oral)   Resp 20   Ht 6' 3\" (1.905 m)   Wt (!) 334 lb 4.8 oz (151.6 kg)   SpO2 92%   BMI 41.78 kg/m²   Temp (24hrs), Av.2 °F (36.8 °C), Min:97.7 °F (36.5 °C), Max:98.5 °F (36.9 °C)    Recent Labs     21  1556 21   POCGLU 174* 274* 137* 238*       I/O (24Hr): Intake/Output Summary (Last 24 hours) at 5/10/2021 0803  Last data filed at 5/10/2021 0030  Gross per 24 hour   Intake 490 ml   Output 1250 ml   Net -760 ml       Labs:  Hematology:  Recent Labs     21  0338 05/10/21  0544   WBC  --  7.8 15.1*   RBC  --  5.20 4.81   HGB  --  15.3 13.9   HCT  --  48.7 43.8   MCV  --  93.7 91.1   MCH  --  29.4 29.0   MCHC  --  31.4 31.8   RDW  --  16.6* 17.2*   PLT  --  178 204   MPV  --  11.6 9.8   DDIMER DISREGARD RESULT. SPECIMEN CLOTTED. 0.51  --      Chemistry:  Recent Labs     21  0607 05/10/21  0544     --  141   K 4.4  --  4.5     --  108*   CO2 24  --  22   GLUCOSE 113*  --  177*   BUN 15  --  22*   CREATININE 1.00  --  1.02   MG 2.2  --   --    ANIONGAP 14  --  11   LABGLOM >60  --  >60   GFRAA >60  --  >60   CALCIUM 9.5  --  9.6   PROBNP 95  --   --    TROPHS 15 12  --      Recent Labs     21  1556 21   PROT 7.2  --   --   --   --    LABALBU 3.7  --   --   --   --    AST 19  --   --   --   --    ALT 11  --   --   --   --    ALKPHOS 156*  --   --   --   --    BILITOT 0.52  --   --   --   --    LIPASE 26  --   --   --   --    POCGLU  --  174* 274* 137* 238*     ABG:No results found for: POCPH, PHART, PH, POCPCO2, MDJ7PNF, PCO2, POCPO2, PO2ART, PO2, POCHCO3, IFQ3UPU, HCO3, NBEA, PBEA, BEART, BE, THGBART, THB, JQS7TGO, KJQU7DEG, R0PAVTUQ, O2SAT, FIO2  No results found for: SPECIAL  No results found for: CULTURE    Radiology:  Xr Chest Portable    Result Date: 2021  No acute process.      Ct Chest Pulmonary Embolism W Contrast    Result Date: 2021  No evidence of central and proximal pulmonary embolus but the exam is quite limited as pulmonary arterial opacification is poor. Cholelithiasis without evidence of cholecystitis. Physical Examination:     Physical Exam  Vitals signs and nursing note reviewed. HENT:      Mouth/Throat:      Mouth: Mucous membranes are moist.   Eyes:      Extraocular Movements: Extraocular movements intact. Cardiovascular:      Rate and Rhythm: Normal rate and regular rhythm. Pulses: Normal pulses. Heart sounds: Normal heart sounds. Pulmonary:      Effort: Pulmonary effort is normal.      Breath sounds: Examination of the right-upper field reveals wheezing. Examination of the left-upper field reveals wheezing. Examination of the right-middle field reveals wheezing. Examination of the left-middle field reveals wheezing. Examination of the right-lower field reveals wheezing. Examination of the left-lower field reveals wheezing. Wheezing present. Abdominal:      General: Bowel sounds are normal.      Palpations: Abdomen is soft. Musculoskeletal: Normal range of motion. Skin:     General: Skin is warm. Capillary Refill: Capillary refill takes less than 2 seconds. Neurological:      Mental Status: He is alert and oriented to person, place, and time. Assessment:     Hospital Problems           Last Modified POA    * (Principal) Chronic obstructive pulmonary disease with acute exacerbation ([address] 5/9/2021 Yes    Diabetes mellitus type 2, controlled ([address] 5/9/2021 Yes    GERD (gastroesophageal reflux disease) 5/9/2021 Yes    Sleep apnea 5/9/2021 Yes    Tobacco abuse 5/9/2021 Yes    Hypoxia 5/9/2021 Yes          Plan:     COPD with acute exacerbation and hypoxia; Solu-Medrol, DuoNeb, PPI, oxygen to maintain sat greater than 90%, resume home Symbicort. Monitor vitals including pulse ox.  Pulmonary following     Type 2 diabetes; insulin sliding scale and check A1c     Tobacco abuse; encourage cessation of tobacco and offer nicotine patch     Continue home Lyrica     Repeat BMP and CBC in a.m.     He asked to stop the low carb diet     Patient encouraged to follow through with outpatient sleep study to assess for ROSEY    DANIEL Montez CNP  5/10/2021  8:03 AM

## 2021-05-10 NOTE — PLAN OF CARE
Respiratory therapy following patient. Taking aerosol treatments and Symbicort as ordered. Breath sounds diminished with expiratory wheezing. SpO2 95% on 4lpm. O2 increased to 6lpm with exertion to maintain SpO2. Patient requested to wear BiPAP for short period of time today, during nap. Sputum sent to lab today. Will continue to follow respiratory status.

## 2021-05-10 NOTE — PROGRESS NOTES
Nutrition Note    Consulted for diet education. Pt declines any education on diet at this time. Did briefly discuss rationale for carbohydrate controlled diet to help with blood sugar management, however pt continues to decline any verbal or written information. Please re-consult if pt is accepting of education at a later date.      Electronically signed by Alberto North RD, LD on 5/10/21 at 11:20 AM EDT    Alberto North RD,RJ  Clinical Dietitian  [facility]  [phone]

## 2021-05-10 NOTE — PROGRESS NOTES
Physical Therapy    Facility/Department: [facility] SURG ICU  Initial Assessment    NAME: Holly Souza  : 1965  MRN: [id number]    Date of Service: 5/10/2021  Pt presented with difficulty breathing with need for BiPap. Discharge Recommendations:  Patient would benefit from continued therapy after discharge   PT Equipment Recommendations  Equipment Needed: (TBD- likely none as pt owns a rollator but will continue assess once able to attempt rollator at next session)    Assessment   Body structures, Functions, Activity limitations: Decreased functional mobility ; Decreased strength;Decreased endurance;Decreased balance  Assessment: Pt is most limited at this time by his decreased endurance. Pt able to ambulate 12ft at farther bout of gait on 6L of oxygen with walker and CGA. Pt requires min A to stand. Pt currently doesn't have the endurance to ambulate a household distance and therefore would be unsafe to return to prior living arrangements but will continue to assess once able to further attempt mobility. Pt likely to benefit from further therapy at discharge. Prognosis: Good  Decision Making: Medium Complexity  PT Education: Goals;PT Role;Plan of Care;Transfer Training;Functional Mobility Training;Gait Training  REQUIRES PT FOLLOW UP: Yes  Activity Tolerance  Activity Tolerance: Patient limited by fatigue;Patient limited by endurance       Patient Diagnosis(es): The encounter diagnosis was COPD exacerbation ([facility] [address] has a past medical history of COPD (chronic obstructive pulmonary disease) ([facility] [address] and Diabetes mellitus ([facility] [address] has no past surgical history on file. Restrictions  Restrictions/Precautions  Restrictions/Precautions: Fall Risk, Up as Tolerated  Required Braces or Orthoses?: No  Position Activity Restriction  Other position/activity restrictions: \"Can increase activity level as tolerated as long as O2 saturation maintained above 92% and as tolerated. \"  Vision/Hearing  Vision: Impaired(reports he needs glasses but doesn't own any)  Hearing: Within functional limits     Subjective  General  Patient assessed for rehabilitation services?: Yes  Response To Previous Treatment: Not applicable  Family / Caregiver Present: No  Follows Commands: Within Functional Limits  Subjective  Subjective: Pt seated on EOB and agreeable to therapy this afternoon. Pt denies any pain at this time. RN agreeable to therapy.   Pain Screening  Patient Currently in Pain: Denies  Vital Signs  Patient Currently in Pain: Denies       Orientation  Orientation  Overall Orientation Status: Within Functional Limits  Social/Functional History  Social/Functional History  Lives With: Family  Type of Home: House  Home Layout: One level  Home Access: Stairs to enter without rails  Entrance Stairs - Number of Steps: 3  Bathroom Shower/Tub: Tub/Shower unit  Bathroom Toilet: Standard  Bathroom Equipment: Grab bars in shower, Shower chair, Toilet raiser  Bathroom Accessibility: [facility] Blotter accessible  Home Equipment: 4 wheeled walker  ADL Assistance: [address] [place] [address] Avenue: (Family performs)  Homemaking Responsibilities: No  Ambulation Assistance: Independent  Transfer Assistance: Independent  Active : No  Patient's  Info: sister or niece drive  Mode of Transportation: Family, [employer]  Occupation: Retired  Type of occupation:   Leisure & Hobbies: Enjoys working on cars  Cognition   Cognition  Overall Cognitive Status: WFL    Objective          AROM RLE (degrees)  RLE AROM: WFL  AROM LLE (degrees)  LLE AROM : WFL  AROM RUE (degrees)  RUE AROM : WFL  AROM LUE (degrees)  LUE AROM : WFL  Strength RLE  Comment: grossly 5/5  Strength LLE  Comment: grossly 5/5  Strength RUE  Comment: Co evaluation with OT-see OT evaluation for full UE assessment  Strength LUE  Comment: Co evaluation with OT-see OT evaluation for full UE assessment     Sensation  Overall Sensation Status: WFL(Pt denies any numbness or tingling)  Bed mobility  Supine to Sit: (Pt up to EOB upon arrival)  Sit to Supine: (Pt retired to chair at end of session)  Scooting: Stand by assistance  Transfers  Sit to Stand: Minimal Assistance  Stand to sit: Minimal Assistance  Ambulation  Ambulation?: Yes  Ambulation 1  Surface: level tile  Device: Rolling Walker  Other Apparatus: O2(6L)  Assistance: Contact guard assistance  Quality of Gait: forward flexed, decreased endurance (cues for breathing techniques), decreased step length, decreased gait speed  Gait Deviations: Slow Ashley;Decreased step length  Distance: 8ft, seated rest break, 12ft  Comments: Oxygen saturation 97% when checked once seated on toilet; 93% upon seated in chair  Stairs/Curb  Stairs?: No     Balance  Posture: Fair  Sitting - Static: Good  Sitting - Dynamic: Good;-  Standing - Static: Fair;+  Standing - Dynamic: Fair;+  Comments: standing balance assessed with RW        Plan   Plan  Times per week: 5-6x  Times per day: Daily  Current Treatment Recommendations: Strengthening, Transfer Training, Functional Mobility Training, Balance Training, Endurance Training, Gait Training, Stair training, Patient/Caregiver Education & Training, Home Exercise Program, Safety Education & Training  Safety Devices  Type of devices: Call light within reach, Gait belt, Nurse notified, Left in chair  Restraints  Initially in place: No      AM-PAC Score  AM-PAC Inpatient Mobility Raw Score : 16 (05/10/21 1559)  AM-PAC Inpatient T-Scale Score : 40.78 (05/10/21 1559)  Mobility Inpatient CMS 0-100% Score: 54.16 (05/10/21 1559)  Mobility Inpatient CMS G-Code Modifier : CK (05/10/21 1559)          Goals  Short term goals  Time Frame for Short term goals: 14 visits  Short term goal 1: Pt to ambulate [id number] modified independently with rollator to allow for return to prior functional level  Short term goal 2: Pt to sit <> stand transfer independently to allow for return to prior functional level  Short term goal 3: Pt to ascend/descend 3 steps independently without rails to allow for safe entrance/exit to the home  Short term goal 4: Pt to tolerate 30 minutes of therapy for endurance  Short term goal 5: Pt to demonstrate independent bed mobility to allow for return to prior functional level       Therapy Time   Individual Concurrent Group Co-treatment   Time In 1355         Time Out 1432         Minutes 37         Timed Code Treatment Minutes: 1325 [facility], PT

## 2021-05-10 NOTE — PLAN OF CARE
Problem: Pain:  Goal: Control of chronic pain  Description: Control of chronic pain  5/10/2021 0023 by Aubrie Oviedo RN  Outcome: Ongoing   No signs/symptoms of pain noted, pain rating <3 on scale 0-10, pain controlled with medication/repositioning   Problem: Skin Integrity:  Goal: Will show no infection signs and symptoms  Description: Will show no infection signs and symptoms  5/10/2021 0023 by Aubrie Oviedo RN  Outcome: Ongoing   No signs/symptoms of pain noted, pain rating <3 on scale 0-10, pain controlled with medication/repositioning   Problem: Falls - Risk of:  Goal: Will remain free from falls  Description: Will remain free from falls  5/10/2021 0023 by Aubrie Oviedo RN  Outcome: Ongoing   No falls/injuries this shift, bed in lowest position, brakes on, bed alarm on, call light in reach, side rails up x2   Problem: Safety:  Goal: Free from accidental physical injury  Description: Free from accidental physical injury  5/10/2021 0023 by Aubrie Oviedo RN  Outcome: Ongoing   No falls/injuries this shift, bed in lowest position, brakes on, bed alarm on, call light in reach, side rails up x2   Problem: Daily Care:  Goal: Daily care needs are met  Description: Daily care needs are met  5/10/2021 0023 by Aubrie Oviedo RN  Outcome: Ongoing   Pt. Encouraged to participate in ADL's and plan of care   Problem: Discharge Planning:  Goal: Patients continuum of care needs are met  Description: Patients continuum of care needs are met  5/10/2021 0023 by Aubrie Oviedo RN  Outcome: Ongoing   Pt.  Encouraged to participate in ADL's and plan of care   Problem: Breathing Pattern - Ineffective:  Goal: Ability to achieve and maintain a regular respiratory rate will improve  Description: Ability to achieve and maintain a regular respiratory rate will improve  5/10/2021 0023 by Aubrie Oviedo RN  Outcome: Ongoing   Wore Bipap this shift, O2 sats > 90%

## 2021-05-11 LAB
-: NORMAL
ANION GAP SERPL CALCULATED.3IONS-SCNC: 8 MMOL/L (ref 9–17)
BUN BLDV-MCNC: 23 MG/DL (ref 6–20)
BUN/CREAT BLD: ABNORMAL (ref 9–20)
CALCIUM SERPL-MCNC: 9 MG/DL (ref 8.6–10.4)
CHLORIDE BLD-SCNC: 105 MMOL/L (ref 98–107)
CO2: 24 MMOL/L (ref 20–31)
CREAT SERPL-MCNC: 0.84 MG/DL (ref 0.7–1.2)
CULTURE: ABNORMAL
DIRECT EXAM: ABNORMAL
GFR AFRICAN AMERICAN: >60 ML/MIN
GFR NON-AFRICAN AMERICAN: >60 ML/MIN
GFR SERPL CREATININE-BSD FRML MDRD: ABNORMAL ML/MIN/{1.73_M2}
GFR SERPL CREATININE-BSD FRML MDRD: ABNORMAL ML/MIN/{1.73_M2}
GLUCOSE BLD-MCNC: 192 MG/DL (ref 75–110)
GLUCOSE BLD-MCNC: 198 MG/DL (ref 70–99)
GLUCOSE BLD-MCNC: 204 MG/DL (ref 75–110)
GLUCOSE BLD-MCNC: 235 MG/DL (ref 75–110)
GLUCOSE BLD-MCNC: 263 MG/DL (ref 75–110)
HCT VFR BLD CALC: 42.6 % (ref 41–53)
HEMOGLOBIN: 13.7 G/DL (ref 13.5–17.5)
Lab: ABNORMAL
MCH RBC QN AUTO: 29.1 PG (ref 26–34)
MCHC RBC AUTO-ENTMCNC: 32.1 G/DL (ref 31–37)
MCV RBC AUTO: 90.5 FL (ref 80–100)
NRBC AUTOMATED: ABNORMAL PER 100 WBC
PDW BLD-RTO: 17.6 % (ref 12.5–15.4)
PLATELET # BLD: 188 K/UL (ref 140–450)
PMV BLD AUTO: 10.2 FL (ref 6–12)
POTASSIUM SERPL-SCNC: 4.7 MMOL/L (ref 3.7–5.3)
RBC # BLD: 4.7 M/UL (ref 4.5–5.9)
REASON FOR REJECTION: NORMAL
SODIUM BLD-SCNC: 137 MMOL/L (ref 135–144)
SPECIMEN DESCRIPTION: ABNORMAL
WBC # BLD: 14.6 K/UL (ref 3.5–11)
ZZ NTE CLEAN UP: ORDERED TEST: NORMAL
ZZ NTE WITH NAME CLEAN UP: SPECIMEN SOURCE: NORMAL

## 2021-05-11 PROCEDURE — 36415 COLL VENOUS BLD VENIPUNCTURE: CPT

## 2021-05-11 PROCEDURE — 6360000002 HC RX W HCPCS: Performed by: NURSE PRACTITIONER

## 2021-05-11 PROCEDURE — 97110 THERAPEUTIC EXERCISES: CPT

## 2021-05-11 PROCEDURE — 82947 ASSAY GLUCOSE BLOOD QUANT: CPT

## 2021-05-11 PROCEDURE — 6370000000 HC RX 637 (ALT 250 FOR IP): Performed by: NURSE PRACTITIONER

## 2021-05-11 PROCEDURE — 94660 CPAP INITIATION&MGMT: CPT

## 2021-05-11 PROCEDURE — 94761 N-INVAS EAR/PLS OXIMETRY MLT: CPT

## 2021-05-11 PROCEDURE — 99232 SBSQ HOSP IP/OBS MODERATE 35: CPT | Performed by: INTERNAL MEDICINE

## 2021-05-11 PROCEDURE — 94640 AIRWAY INHALATION TREATMENT: CPT

## 2021-05-11 PROCEDURE — 2060000000 HC ICU INTERMEDIATE R&B

## 2021-05-11 PROCEDURE — 2700000000 HC OXYGEN THERAPY PER DAY

## 2021-05-11 PROCEDURE — 2580000003 HC RX 258: Performed by: NURSE PRACTITIONER

## 2021-05-11 PROCEDURE — 85027 COMPLETE CBC AUTOMATED: CPT

## 2021-05-11 PROCEDURE — 80048 BASIC METABOLIC PNL TOTAL CA: CPT

## 2021-05-11 PROCEDURE — 97116 GAIT TRAINING THERAPY: CPT

## 2021-05-11 RX ORDER — METHYLPREDNISOLONE SODIUM SUCCINATE 40 MG/ML
40 INJECTION, POWDER, LYOPHILIZED, FOR SOLUTION INTRAMUSCULAR; INTRAVENOUS EVERY 8 HOURS
Status: DISCONTINUED | OUTPATIENT
Start: 2021-05-11 | End: 2021-05-12

## 2021-05-11 RX ADMIN — APIXABAN 5 MG: 5 TABLET, FILM COATED ORAL at 08:08

## 2021-05-11 RX ADMIN — SODIUM CHLORIDE, PRESERVATIVE FREE 10 ML: 5 INJECTION INTRAVENOUS at 08:07

## 2021-05-11 RX ADMIN — PREDNISONE 40 MG: 20 TABLET ORAL at 08:07

## 2021-05-11 RX ADMIN — TRAMADOL HYDROCHLORIDE 50 MG: 50 TABLET, FILM COATED ORAL at 20:59

## 2021-05-11 RX ADMIN — IPRATROPIUM BROMIDE AND ALBUTEROL SULFATE 1 AMPULE: .5; 3 SOLUTION RESPIRATORY (INHALATION) at 19:32

## 2021-05-11 RX ADMIN — INSULIN LISPRO 4 UNITS: 100 INJECTION, SOLUTION INTRAVENOUS; SUBCUTANEOUS at 12:09

## 2021-05-11 RX ADMIN — BUDESONIDE AND FORMOTEROL FUMARATE DIHYDRATE 2 PUFF: 160; 4.5 AEROSOL RESPIRATORY (INHALATION) at 19:32

## 2021-05-11 RX ADMIN — FAMOTIDINE 20 MG: 20 TABLET ORAL at 21:00

## 2021-05-11 RX ADMIN — CEFTRIAXONE SODIUM 1000 MG: 1 INJECTION, POWDER, FOR SOLUTION INTRAMUSCULAR; INTRAVENOUS at 12:09

## 2021-05-11 RX ADMIN — METHYLPREDNISOLONE SODIUM SUCCINATE 40 MG: 40 INJECTION, POWDER, FOR SOLUTION INTRAMUSCULAR; INTRAVENOUS at 20:59

## 2021-05-11 RX ADMIN — INSULIN LISPRO 3 UNITS: 100 INJECTION, SOLUTION INTRAVENOUS; SUBCUTANEOUS at 20:50

## 2021-05-11 RX ADMIN — SODIUM CHLORIDE, PRESERVATIVE FREE 10 ML: 5 INJECTION INTRAVENOUS at 20:59

## 2021-05-11 RX ADMIN — LORAZEPAM 0.5 MG: 2 INJECTION INTRAMUSCULAR; INTRAVENOUS at 20:59

## 2021-05-11 RX ADMIN — INSULIN LISPRO 4 UNITS: 100 INJECTION, SOLUTION INTRAVENOUS; SUBCUTANEOUS at 16:19

## 2021-05-11 RX ADMIN — BUDESONIDE AND FORMOTEROL FUMARATE DIHYDRATE 2 PUFF: 160; 4.5 AEROSOL RESPIRATORY (INHALATION) at 08:29

## 2021-05-11 RX ADMIN — IPRATROPIUM BROMIDE AND ALBUTEROL SULFATE 1 AMPULE: .5; 3 SOLUTION RESPIRATORY (INHALATION) at 15:54

## 2021-05-11 RX ADMIN — PREGABALIN 50 MG: 25 CAPSULE ORAL at 13:42

## 2021-05-11 RX ADMIN — IPRATROPIUM BROMIDE AND ALBUTEROL SULFATE 1 AMPULE: .5; 3 SOLUTION RESPIRATORY (INHALATION) at 08:28

## 2021-05-11 RX ADMIN — IPRATROPIUM BROMIDE AND ALBUTEROL SULFATE 1 AMPULE: .5; 3 SOLUTION RESPIRATORY (INHALATION) at 12:04

## 2021-05-11 RX ADMIN — INSULIN LISPRO 2 UNITS: 100 INJECTION, SOLUTION INTRAVENOUS; SUBCUTANEOUS at 08:07

## 2021-05-11 RX ADMIN — APIXABAN 5 MG: 5 TABLET, FILM COATED ORAL at 21:00

## 2021-05-11 RX ADMIN — METHYLPREDNISOLONE SODIUM SUCCINATE 40 MG: 40 INJECTION, POWDER, FOR SOLUTION INTRAMUSCULAR; INTRAVENOUS at 16:19

## 2021-05-11 RX ADMIN — METHYLPREDNISOLONE SODIUM SUCCINATE 60 MG: 125 INJECTION, POWDER, FOR SOLUTION INTRAMUSCULAR; INTRAVENOUS at 01:58

## 2021-05-11 RX ADMIN — FAMOTIDINE 20 MG: 20 TABLET ORAL at 08:08

## 2021-05-11 RX ADMIN — PREGABALIN 50 MG: 25 CAPSULE ORAL at 21:00

## 2021-05-11 RX ADMIN — PREGABALIN 50 MG: 25 CAPSULE ORAL at 08:07

## 2021-05-11 ASSESSMENT — PAIN SCALES - GENERAL
PAINLEVEL_OUTOF10: 7
PAINLEVEL_OUTOF10: 0

## 2021-05-11 ASSESSMENT — ENCOUNTER SYMPTOMS
DIARRHEA: 0
BLOOD IN STOOL: 0
WHEEZING: 0
CHEST TIGHTNESS: 0
NAUSEA: 0
COUGH: 1
CONSTIPATION: 0
VOMITING: 0
ABDOMINAL PAIN: 0
SHORTNESS OF BREATH: 0

## 2021-05-11 ASSESSMENT — PAIN DESCRIPTION - LOCATION: LOCATION: LEG

## 2021-05-11 ASSESSMENT — PAIN DESCRIPTION - PAIN TYPE: TYPE: CHRONIC PAIN

## 2021-05-11 NOTE — PROGRESS NOTES
[facility]  Office: [address], DO, Mayra Saleh, DO, Carrie Loboport, DO, Marlo Kanner Blood, DO, Josef Loving MD, Etelvina Day MD, Kelle Stephens MD, Jaleesa Low MD, Rebecca Mccoy MD, Faith Rausch MD, Savi Cordova MD, Marco Antonio Herrera MD, Chandrika Bassett DO, Tez Sanders MD, Caryn Sanchez, DO, Luis Flor MD,  Maryann Pantoja DO, Yael Akers MD, Arnel Ramirez MD, Nereida Larsen MD, Cristi Oseguera MD, Bertram Abrams [facility], [facility], CNP, Mor Knight, CNP, April Salvador, CNS, Ghazal Vidales, CNP, Guy Lira, CNP, Nidhi Bourgeois, CNP, Anna Sainz, CNP, Leonard Horner, CNP, Caitlyn Rebolledo PA-C, Kj Cabrera, [facility], Janeth Rooney, CNP, Isael Kumar, CNP, Geri Camacho, CNP, Chiqui Fermin, CNP, Ellie Ybarra, CNP, [facility] [address]    Progress Note    5/11/2021    4:41 PM    Name:   Holly Souza  MRN:     [id number]     [place]:      [de-identified]   Room:   [address] [place] [address] Day:  2  Admit Date:  5/9/2021  2:24 AM    PCP:   DANIEL Carter CNP  Code Status:  Full Code    Subjective:     C/C:sob    Interval History Status: improved. No issues overnight  Respiratory status improved  Still requiring O2  Still having some wheezing at bases  Did not sleep well  No other complaints     Brief History:     Holly Souza is a 49 y.o. Non-/non  male who presents with No chief complaint on file. and is admitted to the hospital for the management of Chronic obstructive pulmonary disease with acute exacerbation ([facility] [address] [facility]    Patient states he woke up abruptly short of breath last night. Said his chest and lungs felt tight. States he has had problems with shortness of breath and COPD for years but last night it was worse. He reports noticing some wheezing but he denies cough, fever, chills or nausea and vomiting. He also reports chronic lower leg edema.   He does not follow with a pulmonologist.  According to the chart he has a history of COPD, clotting disorder with history of DVT, long-term anticoagulation, diabetes, GERD, sleep apnea and tobacco use. The patient himself is not a good historian he says he may or may not have had a sleep study but he does not wear a CPAP and is not sure if he is on any blood thinners. I called his sister who does confirm that he takes Eliquis.     BMP and CBC are unremarkable. BNP is 95  CT shows no evidence of central or proximal pulmonary embolus but the exam is quite limited as pulmonary arterial opacification is poor. Per documentation he was hypoxic at 88% and placed on 2 L nasal cannula while in the ER     Solu-Medrol and duo nebs ordered  Continuing home Symbicort and Eliquis    On Sunday I was called and asked to evaluate the patient related to shortness of breath and stating he feels like he cannot breathe. When out of the room he is slightly tachypneic and tearful. Tells me he feels like he cannot breathe. Lung sounds are diminished. I requested respiratory place the patient on BiPAP. Ordered Ativan 0.5 mg IV to help with anxiety and consulted pulmonary medicine. I also requested they move the patient into our ICU room 324 for closer observation. After placing the patient on BiPAP he immediately says he feels a little better. Doing much better  Pulmonary following    Review of Systems:     Review of Systems   Constitutional: Negative for chills, diaphoresis and fever. HENT: Negative for congestion. Eyes: Negative for visual disturbance. Respiratory: Positive for cough. Negative for chest tightness, shortness of breath and wheezing. Cardiovascular: Negative for chest pain, palpitations and leg swelling. Gastrointestinal: Negative for abdominal pain, blood in stool, constipation, diarrhea, nausea and vomiting. Genitourinary: Negative for difficulty urinating.    Neurological: Negative for dizziness, weakness, light-headedness, numbness and headaches. All other systems reviewed and are negative. Medications: Allergies:  No Known Allergies    Current Meds:   Scheduled Meds:    methylPREDNISolone  40 mg Intravenous Q8H    sodium chloride flush  5-40 mL Intravenous 2 times per day    famotidine  20 mg Oral BID    ipratropium-albuterol  1 ampule Inhalation Q4H WA    apixaban  5 mg Oral BID    budesonide-formoterol  2 puff Inhalation BID    pregabalin  50 mg Oral TID    insulin lispro  0-12 Units Subcutaneous TID WC    insulin lispro  0-6 Units Subcutaneous Nightly     Continuous Infusions:    sodium chloride      dextrose       PRN Meds: sodium chloride flush, sodium chloride flush, sodium chloride, promethazine **OR** ondansetron, nicotine, polyethylene glycol, acetaminophen **OR** acetaminophen, albuterol, glucose, dextrose, glucagon (rDNA), dextrose, traMADol, LORazepam, LORazepam    Data:     Past Medical History:   has a past medical history of COPD (chronic obstructive pulmonary disease) ([facility] [address] [facility] and Diabetes mellitus ([facility] Social History:   reports that he has been smoking cigarettes. He has a 25.00 pack-year smoking history. He has never used smokeless tobacco. He reports previous alcohol use. He reports previous drug use. Family History:   Family History   Problem Relation Age of Onset    Lung Cancer Mother        Vitals:  /74   Pulse 72   Temp 98.1 °F (36.7 °C) (Oral)   Resp 16   Ht 6' 3\" (1.905 m)   Wt (!) 340 lb 8 oz (154.4 kg)   SpO2 94%   BMI 42.56 kg/m²   Temp (24hrs), Av.2 °F (36.8 °C), Min:97.7 °F (36.5 °C), Max:98.5 °F (36.9 °C)    Recent Labs     05/10/21  2016 21  0751 21  1103 21  1558   POCGLU 351* 192* 204* 235*       I/O (24Hr):     Intake/Output Summary (Last 24 hours) at 2021 1641  Last data filed at 2021 1640  Gross per 24 hour   Intake 200 ml   Output 1700 ml   Net -1500 ml       Labs:  Hematology:  Recent Labs 05/09/21  0239 05/09/21  0338 05/10/21  0544 05/11/21  0543   WBC  --  7.8 15.1* 14.6*   RBC  --  5.20 4.81 4.70   HGB  --  15.3 13.9 13.7   HCT  --  48.7 43.8 42.6   MCV  --  93.7 91.1 90.5   MCH  --  29.4 29.0 29.1   MCHC  --  31.4 31.8 32.1   RDW  --  16.6* 17.2* 17.6*   PLT  --  178 204 188   MPV  --  11.6 9.8 10.2   DDIMER DISREGARD RESULT. SPECIMEN CLOTTED. 0.51  --   --      Chemistry:  Recent Labs     05/09/21 0239 05/09/21  0607 05/10/21  0544 05/11/21  0728     --  141 137   K 4.4  --  4.5 4.7     --  108* 105   CO2 24  --  22 24   GLUCOSE 113*  --  177* 198*   BUN 15  --  22* 23*   CREATININE 1.00  --  1.02 0.84   MG 2.2  --   --   --    ANIONGAP 14  --  11 8*   LABGLOM >60  --  >60 >60   GFRAA >60  --  >60 >60   CALCIUM 9.5  --  9.6 9.0   PROBNP 95  --   --   --    TROPHS 15 12  --   --      Recent Labs     05/09/21  0239 05/09/21  0338 05/09/21  0338 05/10/21  1202 05/10/21  1634 05/10/21  2016 05/11/21  0751 05/11/21  1103 05/11/21  1558   PROT 7.2  --   --   --   --   --   --   --   --    LABALBU 3.7  --   --   --   --   --   --   --   --    LABA1C  --  5.6  --   --   --   --   --   --   --    AST 19  --   --   --   --   --   --   --   --    ALT 11  --   --   --   --   --   --   --   --    ALKPHOS 156*  --   --   --   --   --   --   --   --    BILITOT 0.52  --   --   --   --   --   --   --   --    LIPASE 26  --   --   --   --   --   --   --   --    POCGLU  --   --    < > 198* 324* 351* 192* 204* 235*    < > = values in this interval not displayed.      ABG:No results found for: POCPH, PHART, PH, POCPCO2, UBC9VUQ, PCO2, POCPO2, PO2ART, PO2, POCHCO3, JMH3AWS, HCO3, NBEA, PBEA, BEART, BE, THGBART, THB, LSA4GLT, CZLH5MZK, U1XVQHBP, O2SAT, FIO2  Lab Results   Component Value Date/Time    SPECIAL NOT REPORTED 05/10/2021 09:35 AM     Lab Results   Component Value Date/Time    CULTURE NORMAL RESPIRATORY CARLOS ALBERTO HEAVY GROWTH 05/10/2021 09:35 AM       Radiology:  [facility] Chest Portable    Result Date: study as OP  - Continue home medications  - Likely dc tomorrow if respiratory status stable     Lazara Kendall MD  5/11/2021  4:41 PM

## 2021-05-11 NOTE — FLOWSHEET NOTE
SPIRITUAL CARE DEPARTMENT - [facility]  PROGRESS NOTE    Shift date: Day   Shift day: Tuesday   Shift # 1    Room # 324/324-01   Name: Wei Dumont            Age: 49 y.o. Gender: male          Alevism: Unknown    Place of Evangelical: Unknown     Referral: Routine Visit    Admit Date & Time: 5/9/2021  2:24 AM    PATIENT/EVENT DESCRIPTION:  Wei Dumont is a 49 y.o. male whom writer met in the inpatient setting. RN gave referral for writer to visit. SPIRITUAL ASSESSMENT/INTERVENTION:  Mr. Maikel Ferrari was sitting in the bedside chair, watching TV. He greeted writer. He told writer he wanted to go home. He told writer where he lived and that he lived with his sister. He denied any spiritual needs at this time. Writer offered words of support and encouragement and wished Pt well. SPIRITUAL CARE FOLLOW-UP PLAN:  Chaplains will remain available to offer spiritual and emotional support as needed. 05/11/21 1449   Encounter Summary   Services provided to: Patient   Referral/Consult From: [facility] Family members   Continue Visiting   (5/11/21)   Complexity of Encounter Low   Length of Encounter 15 minutes   Routine   Type Initial   Assessment Calm; Approachable   Intervention Sustaining presence/ Ministry of presence; Explored feelings, thoughts, concerns;Explored coping resources   Outcome Expressed gratitude;Coping     Electronically signed by Karma Garcia, Oncology Outpatient [facility], [address] [facility] Radiation Oncology  5/11/2021  2:53 PM

## 2021-05-11 NOTE — PLAN OF CARE
Problem: Pain:  Goal: Pain level will decrease  Description: Pain level will decrease  Outcome: Ongoing  Goal: Control of acute pain  Description: Control of acute pain  Outcome: Ongoing  Goal: Control of chronic pain  Description: Control of chronic pain  Outcome: Ongoing  Goal: Patient's pain/discomfort is manageable  Description: Patient's pain/discomfort is manageable  Outcome: Ongoing   Pt assessed for pain, non-pharm interventions used, pain meds given if needed, Pt reassesed for pain. Problem: Skin Integrity:  Goal: Will show no infection signs and symptoms  Description: Will show no infection signs and symptoms  Outcome: Ongoing  Goal: Absence of new skin breakdown  Description: Absence of new skin breakdown  Outcome: Ongoing  Goal: Risk for impaired skin integrity will decrease  Description: Risk for impaired skin integrity will decrease  Outcome: Ongoing   Patient turned and repositioned q 2 hrs and as needed. Heels elevated as needed. Wedges / pillow support used. Dressings changed as needed and per orders. Waffle mattress in place and properly inflated. Continue to monitor skin for breakdown. Problem: Falls - Risk of:  Goal: Will remain free from falls  Description: Will remain free from falls  Outcome: Ongoing  Goal: Absence of physical injury  Description: Absence of physical injury  Outcome: Ongoing   Pt remained free of falls, non skid socks are on, bed alarm on, call light within reach, and patient instructed to call for help when needed.      Problem: Infection:  Goal: Will remain free from infection  Description: Will remain free from infection  Outcome: Ongoing   Pt VS will remain stable and Labs will return to normal.      Problem: Safety:  Goal: Free from accidental physical injury  Description: Free from accidental physical injury  Outcome: Ongoing  Goal: Free from intentional harm  Description: Free from intentional harm  Outcome: Ongoing   Pt remained free of falls, non skid socks are on, bed alarm on, call light within reach, and patient instructed to call for help when needed. Problem: Daily Care:  Goal: Daily care needs are met  Description: Daily care needs are met  Outcome: Ongoing   RN will assist pt with ADLs as needed. Problem: Respiratory:  Goal: Ability to maintain a clear airway will improve  Description: Ability to maintain a clear airway will improve  5/10/2021 2348 by Nathaly Barber RN  Outcome: Ongoing  5/10/2021 1757 by Pepe Lopez RCP  Outcome: Ongoing  Goal: Ability to maintain adequate ventilation will improve  Description: Ability to maintain adequate ventilation will improve  5/10/2021 2348 by Nathaly Barber RN  Outcome: Ongoing  5/10/2021 1757 by Pepe Lopez RCP  Outcome: Ongoing   Will monitor pt Spo2 continuously and adjust O2 liters as needed. Problem: Activity:  Goal: Risk for activity intolerance will decrease  Description: Risk for activity intolerance will decrease  Outcome: Ongoing   Pt will improve strength and gate with use of adaptive equipment and Physical Therapy.      Problem: Coping:  Goal: Ability to adjust to condition or change in health will improve  Description: Ability to adjust to condition or change in health will improve  Outcome: Ongoing

## 2021-05-11 NOTE — PROGRESS NOTES
Physical Therapy  Facility/Department: [facility] SURG ICU  Daily Treatment Note  NAME: Romy Arechiga  : 1965  MRN: [id number]    Date of Service: 2021    Discharge Recommendations:  Patient would benefit from continued therapy after discharge        Assessment   Body structures, Functions, Activity limitations: Decreased functional mobility ; Decreased strength;Decreased endurance;Decreased balance  Assessment: Pt with improved activity tolerance this date with two bouts of gait 160' and 200' with CGA and RW on 3 L O2. Pt noted slight SOB after mobility. Pt can ambulate household distances as of this date and would be able to return to prior living arrangements with assistance as needed. Pt will benefit from further therapy at discharge. Prognosis: Good  PT Education: Transfer Training;Functional Mobility Training;Gait Training;General Safety  REQUIRES PT FOLLOW UP: Yes  Activity Tolerance  Activity Tolerance: Patient limited by endurance     Patient Diagnosis(es): The encounter diagnosis was COPD exacerbation ([facility] has a past medical history of COPD (chronic obstructive pulmonary disease) ([facility] [address] and [facility] mellitus ([facility] has no past surgical history on file. Restrictions  Restrictions/Precautions  Restrictions/Precautions: Fall Risk, Up as Tolerated  Required Braces or Orthoses?: No  Position Activity Restriction  Other position/activity restrictions: \"Can increase activity level as tolerated as long as O2 saturation maintained above 92% and as tolerated. \"  Subjective   General  Response To Previous Treatment: Patient with no complaints from previous session. Family / Caregiver Present: No  Subjective  Subjective: Pt up in chair and agreeable to therapy. Pt denies pain at this time.   Pain Screening  Patient Currently in Pain: No  Pain Assessment  Pain Assessment: 0-10  Pain Level: 0  Vital Signs  Patient Currently in Pain: No       Orientation  Orientation  Overall Orientation Status: Within Functional Limits  Cognition      Objective   Bed mobility  Comment: Pt up in chair at start of session and retired to chair at end. Transfers  Sit to Stand: Contact guard assistance  Stand to sit: Contact guard assistance  Stand Pivot Transfers: Contact guard assistance  Comment: Transfers with RW. Ambulation  Ambulation?: Yes  Ambulation 1  Surface: level tile  Device: Rolling Walker  Other Apparatus: O2(3 L)  Assistance: Contact guard assistance  Quality of Gait: Improved endurance with increased gait distance, speed, and elmer.   Gait Deviations: Slow Elmer;Decreased step length  Distance: 160', 200' x 1  Comments: O2 saturation 94-96% during ambulation with O2 at 3 L  Stairs/Curb  Stairs?: No     Balance  Posture: Fair  Sitting - Static: Good  Sitting - Dynamic: Good  Standing - Static: Good;-  Standing - Dynamic: Fair;+  Comments: standing balance assessed with RW  Exercises  Hamstring Sets: 2 x 10 (B) LE in chair  Hip Flexion: 2 x 6 (B) LE  Knee Long Arc Quad: 2 x 10 (B) LE                        G-Code     OutComes Score                                                     AM-PAC Score             Goals  Short term goals  Time Frame for Short term goals: 14 visits  Short term goal 1: Pt to ambulate [id number] modified independently with rollator to allow for return to prior functional level  Short term goal 2: Pt to sit <> stand transfer independently to allow for return to prior functional level  Short term goal 3: Pt to ascend/descend 3 steps independently without rails to allow for safe entrance/exit to the home  Short term goal 4: Pt to tolerate 30 minutes of therapy for endurance  Short term goal 5: Pt to demonstrate independent bed mobility to allow for return to prior functional level    Plan    Plan  Times per week: 5-6x  Times per day: Daily  Current Treatment Recommendations: Strengthening, Transfer Training, Functional Mobility Training, Balance Training, Endurance Training, Gait

## 2021-05-11 NOTE — PROGRESS NOTES
92% and as tolerated. \"     Subjective   General  Patient assessed for rehabilitation services?: Yes  Response to previous treatment: Patient with no complaints from previous session  Family / Caregiver Present: No  General Comment  Comments: RN ok'd for tx this PM. Increased encouragement provided for activity participation this date. Vital Signs  Patient Currently in Pain: Denies     Orientation  Orientation  Overall Orientation Status: Within Functional Limits     Objective    ADL  Additional Comments: 2x verbal prompts,benefit, and encouragement for ADL participation with persistent pt decline        Balance  Sitting Balance: Supervision(seated in recliner for BUE rom/strengthening)    Functional Mobility  Functional Mobility Comments: Pt provided with verbal prompts/benefit for functional mobility to/from door of room with persistent decline for participation    Toilet Transfers  Toilet Transfers Comments: Pt declines use/need for toileting at this time. Bed mobility  Comment: Pt up to chair at start and end of today's session. Transfers  Transfer Comments: Pt declines all attempts for functional transfers/mobility as this time. Cognition  Overall Cognitive Status: WFL           Type of ROM/Therapeutic Exercise  Type of ROM/Therapeutic Exercise: AROM; Resistive Bands  Comment: Pt provided education on, HEP, and demo'd BUE ROM/strengthening with yellow theraband to promote activity tolerance required for participation in ADLs and functional transfers/mobility.   Exercises  Shoulder Elevation: Bilaterally, 1x10 reps  Shoulder Flexion: Bilaterally 1x10 reps  Shoulder ABduction: Bilaterally 1x5, 1x5 reps  Horizontal ABduction: Bilaterally 1x5, 1x5 reps  Horizontal ADduction: Bilaterally 1x5, 1x5 reps  Elbow Flexion: Bilaterally, 1x10 reps  Elbow Extension: Bilaterally, 1x10 reps  Wrist Flexion: yellow theraband, bilaterallly, 1x 10 reps  Wrist Extension: yellow theraband, bilaterally, 1x10 reps  Other: pt seated in recliner during BUE exercises - Increase time to perform, rest breaks x3 required throughout with O2 ranging from ~86-94% via monitor - education and prompts provided to incorporate energy conservation techniques (pursed liip breathing)         Plan   Plan  Times per week: 5-6x/wk  Times per day: Daily  Current Treatment Recommendations: Balance Training, Functional Mobility Training, Endurance Training, Safety Education & Training, Self-Care / ADL, Home Management Training, Strengthening, ROM, Patient/Caregiver Education & Training    Goals  Short term goals  Time Frame for Short term goals: 14 visits  Short term goal 1: Pt will demo functional mobility/transfers w/mod IND using LRD w/no LOB  Short term goal 2: Pt will demo 10mins of activity tolerance to increase engagement in ADL/functional tasks  Short term goal 3: Pt will demo LB ADLs w/mod IND use of AE/DME PRN  Short term goal 4: Pt will demo BUE strengthing HEP to increase participation in functional tasks/ADLs  Short term goal 5: Pt will independently demo good safety awareness throughout engagement in all ADLs/functional tasks  Short term goal 6: Pt will independently demo ability to incorporate energy conservation techniques as needed during engagement in all functional tasks       Therapy Time   Individual Concurrent Group Co-treatment   Time In 1349         Time Out 1419         Minutes 30         Timed Code Treatment Minutes: [address], OTR/L

## 2021-05-12 VITALS
HEART RATE: 70 BPM | TEMPERATURE: 98.2 F | BODY MASS INDEX: 39.17 KG/M2 | DIASTOLIC BLOOD PRESSURE: 88 MMHG | SYSTOLIC BLOOD PRESSURE: 141 MMHG | OXYGEN SATURATION: 95 % | WEIGHT: 315 LBS | RESPIRATION RATE: 14 BRPM | HEIGHT: 75 IN

## 2021-05-12 PROBLEM — J44.1 CHRONIC OBSTRUCTIVE PULMONARY DISEASE WITH ACUTE EXACERBATION (HCC): Status: RESOLVED | Noted: 2021-05-09 | Resolved: 2021-05-12

## 2021-05-12 PROBLEM — R09.02 HYPOXIA: Status: RESOLVED | Noted: 2021-05-09 | Resolved: 2021-05-12

## 2021-05-12 LAB
GLUCOSE BLD-MCNC: 165 MG/DL (ref 75–110)
HCT VFR BLD CALC: 42.6 % (ref 41–53)
HEMOGLOBIN: 13.7 G/DL (ref 13.5–17.5)
MCH RBC QN AUTO: 29.3 PG (ref 26–34)
MCHC RBC AUTO-ENTMCNC: 32.1 G/DL (ref 31–37)
MCV RBC AUTO: 91.1 FL (ref 80–100)
NRBC AUTOMATED: ABNORMAL PER 100 WBC
PDW BLD-RTO: 17.7 % (ref 12.5–15.4)
PLATELET # BLD: 192 K/UL (ref 140–450)
PMV BLD AUTO: 10.4 FL (ref 6–12)
RBC # BLD: 4.68 M/UL (ref 4.5–5.9)
WBC # BLD: 10.5 K/UL (ref 3.5–11)

## 2021-05-12 PROCEDURE — 97116 GAIT TRAINING THERAPY: CPT

## 2021-05-12 PROCEDURE — 94660 CPAP INITIATION&MGMT: CPT

## 2021-05-12 PROCEDURE — 6370000000 HC RX 637 (ALT 250 FOR IP): Performed by: NURSE PRACTITIONER

## 2021-05-12 PROCEDURE — 97535 SELF CARE MNGMENT TRAINING: CPT

## 2021-05-12 PROCEDURE — 97530 THERAPEUTIC ACTIVITIES: CPT

## 2021-05-12 PROCEDURE — 2580000003 HC RX 258: Performed by: NURSE PRACTITIONER

## 2021-05-12 PROCEDURE — 85027 COMPLETE CBC AUTOMATED: CPT

## 2021-05-12 PROCEDURE — 6360000002 HC RX W HCPCS: Performed by: NURSE PRACTITIONER

## 2021-05-12 PROCEDURE — 36415 COLL VENOUS BLD VENIPUNCTURE: CPT

## 2021-05-12 PROCEDURE — 94761 N-INVAS EAR/PLS OXIMETRY MLT: CPT

## 2021-05-12 PROCEDURE — 82947 ASSAY GLUCOSE BLOOD QUANT: CPT

## 2021-05-12 PROCEDURE — 94640 AIRWAY INHALATION TREATMENT: CPT

## 2021-05-12 PROCEDURE — 99239 HOSP IP/OBS DSCHRG MGMT >30: CPT | Performed by: INTERNAL MEDICINE

## 2021-05-12 PROCEDURE — 2700000000 HC OXYGEN THERAPY PER DAY

## 2021-05-12 PROCEDURE — 94618 PULMONARY STRESS TESTING: CPT

## 2021-05-12 PROCEDURE — APPSS45 APP SPLIT SHARED TIME 31-45 MINUTES: Performed by: NURSE PRACTITIONER

## 2021-05-12 RX ORDER — PREDNISONE 10 MG/1
30 TABLET ORAL DAILY
Qty: 9 TABLET | Refills: 0 | Status: SHIPPED | OUTPATIENT
Start: 2021-05-12 | End: 2021-05-15

## 2021-05-12 RX ORDER — PREDNISONE 10 MG/1
20 TABLET ORAL DAILY
Qty: 6 TABLET | Refills: 0 | Status: SHIPPED | OUTPATIENT
Start: 2021-05-12 | End: 2021-05-15

## 2021-05-12 RX ORDER — PREDNISONE 20 MG/1
40 TABLET ORAL DAILY
Qty: 6 TABLET | Refills: 0 | Status: SHIPPED | OUTPATIENT
Start: 2021-05-12 | End: 2021-05-15

## 2021-05-12 RX ORDER — METHYLPREDNISOLONE SODIUM SUCCINATE 40 MG/ML
40 INJECTION, POWDER, LYOPHILIZED, FOR SOLUTION INTRAMUSCULAR; INTRAVENOUS EVERY 12 HOURS
Status: DISCONTINUED | OUTPATIENT
Start: 2021-05-12 | End: 2021-05-12 | Stop reason: HOSPADM

## 2021-05-12 RX ORDER — PREDNISONE 1 MG/1
10 TABLET ORAL DAILY
Qty: 6 TABLET | Refills: 0 | Status: SHIPPED | OUTPATIENT
Start: 2021-05-12 | End: 2021-05-15

## 2021-05-12 RX ADMIN — PREGABALIN 50 MG: 25 CAPSULE ORAL at 08:01

## 2021-05-12 RX ADMIN — SODIUM CHLORIDE, PRESERVATIVE FREE 10 ML: 5 INJECTION INTRAVENOUS at 08:01

## 2021-05-12 RX ADMIN — INSULIN LISPRO 4 UNITS: 100 INJECTION, SOLUTION INTRAVENOUS; SUBCUTANEOUS at 08:01

## 2021-05-12 RX ADMIN — INSULIN LISPRO 2 UNITS: 100 INJECTION, SOLUTION INTRAVENOUS; SUBCUTANEOUS at 12:12

## 2021-05-12 RX ADMIN — IPRATROPIUM BROMIDE AND ALBUTEROL SULFATE 1 AMPULE: .5; 3 SOLUTION RESPIRATORY (INHALATION) at 15:39

## 2021-05-12 RX ADMIN — FAMOTIDINE 20 MG: 20 TABLET ORAL at 08:01

## 2021-05-12 RX ADMIN — APIXABAN 5 MG: 5 TABLET, FILM COATED ORAL at 08:02

## 2021-05-12 RX ADMIN — IPRATROPIUM BROMIDE AND ALBUTEROL SULFATE 1 AMPULE: .5; 3 SOLUTION RESPIRATORY (INHALATION) at 07:37

## 2021-05-12 RX ADMIN — IPRATROPIUM BROMIDE AND ALBUTEROL SULFATE 1 AMPULE: .5; 3 SOLUTION RESPIRATORY (INHALATION) at 11:54

## 2021-05-12 RX ADMIN — METHYLPREDNISOLONE SODIUM SUCCINATE 40 MG: 40 INJECTION, POWDER, FOR SOLUTION INTRAMUSCULAR; INTRAVENOUS at 06:44

## 2021-05-12 RX ADMIN — BUDESONIDE AND FORMOTEROL FUMARATE DIHYDRATE 2 PUFF: 160; 4.5 AEROSOL RESPIRATORY (INHALATION) at 07:37

## 2021-05-12 RX ADMIN — PREGABALIN 50 MG: 25 CAPSULE ORAL at 12:12

## 2021-05-12 ASSESSMENT — PAIN SCALES - GENERAL
PAINLEVEL_OUTOF10: 0
PAINLEVEL_OUTOF10: 0

## 2021-05-12 ASSESSMENT — PAIN DESCRIPTION - LOCATION: LOCATION: LEG

## 2021-05-12 ASSESSMENT — PAIN DESCRIPTION - PAIN TYPE: TYPE: CHRONIC PAIN

## 2021-05-12 NOTE — FLOWSHEET NOTE
Home Oxygen Evaluation    Home Oxygen Evaluation completed. Faye Rued Resting SpO2 on room air = 91%    SpO2 on room air with exercise = 94%      Nocturnal Oximetry with patient on room air is recommended is SpO2 is between 89% and 95% (requires additional order).     Melva Andrews  11:15 AM

## 2021-05-12 NOTE — PROGRESS NOTES
CLINICAL PHARMACY NOTE: MEDS TO [address] Select Patient?: No  Total # of Prescriptions Filled: 3   The following medications were delivered to the patient:  · Prednisone 20mg  · Prednisone 10mg  · Prednisone 5mg  Total # of Interventions Completed: 0  Time Spent (min): 15    Additional Documentation:  meds to beds delivered

## 2021-05-12 NOTE — DISCHARGE SUMMARY
has a known history of COPD, diabetes, sleep apnea and tobacco use. CT PE chest was completed and revealed no evidence of central or proximal PE. He was hypoxic while in the ED and 88% placed on 2 L per nasal cannula. During this hospital stay he received IV steroid and his home Symbicort was continued. He was also wearing BiPAP at night. Pulmonology evaluated patient and recommended appropriate vaccinations for both flu and pneumonia, pulmonary rehab, smoking cessation, weight loss, home O2 eval prior to discharge. Home O2 eval was completed and patient did not qualify for home O2. He did require supplemental O2 this hospital stay and BiPAP at night. Supplemental O2 was successfully weaned to room air prior to discharge. Significant therapeutic interventions:     Supplemental O2    BiPAP    IV steroid    Pulmonology consultation and evaluation    Symbicort    Nebulizer respiratory treatments    Significant Diagnostic Studies: As above  Labs / Micro:  CBC:   Lab Results   Component Value Date    WBC 10.5 05/12/2021    RBC 4.68 05/12/2021    HGB 13.7 05/12/2021    HCT 42.6 05/12/2021    MCV 91.1 05/12/2021    MCH 29.3 05/12/2021    MCHC 32.1 05/12/2021    RDW 17.7 05/12/2021     05/12/2021     BMP:    Lab Results   Component Value Date    GLUCOSE 198 05/11/2021     05/11/2021    K 4.7 05/11/2021     05/11/2021    CO2 24 05/11/2021    ANIONGAP 8 05/11/2021    BUN 23 05/11/2021    CREATININE 0.84 05/11/2021    BUNCRER NOT REPORTED 05/11/2021    CALCIUM 9.0 05/11/2021    LABGLOM >60 05/11/2021    GFRAA >60 05/11/2021    GFR      05/11/2021    GFR NOT REPORTED 05/11/2021         Radiology:  Xr Chest Portable    Result Date: 5/9/2021  No acute process. Ct Chest Pulmonary Embolism W Contrast    Result Date: 5/9/2021  No evidence of central and proximal pulmonary embolus but the exam is quite limited as pulmonary arterial opacification is poor.  Cholelithiasis without evidence of cholecystitis. Consultations:    Consults:     Final Specialist Recommendations/Findings:   IP CONSULT TO PULMONOLOGY      The patient was seen and examined on day of discharge and this discharge summary is in conjunction with any daily progress note from day of discharge. Discharge plan:     Disposition: Home    Physician Follow Up:     DANIEL Garibay CNP  [id number]  [place] [phone]    In 1 week  Make an appointment follow-up within 1 week post hospital discharge       Requiring Further Evaluation/Follow Up POST HOSPITALIZATION/Incidental Findings:     Patient should be referred to a pulmonologist    Recommended PFTs outpatient        Diet: diabetic diet    Activity: As tolerated    Instructions to Patient:     Take medications as prescribed    Attend pulmonary rehab    Follow-up with PCP within 1 week    Complete entire prednisone course    Follow with a pulmonologist    Discharge Medications:      Medication List      START taking these medications    * predniSONE 20 MG tablet  Commonly known as: DELTASONE  Take 2 tablets by mouth daily for 3 days     * predniSONE 10 MG tablet  Commonly known as: DELTASONE  Take 3 tablets by mouth daily for 3 days     * predniSONE 10 MG tablet  Commonly known as: DELTASONE  Take 2 tablets by mouth daily for 3 days     * predniSONE 5 MG tablet  Commonly known as: DELTASONE  Take 2 tablets by mouth daily for 3 days         * This list has 4 medication(s) that are the same as other medications prescribed for you. Read the directions carefully, and ask your doctor or other care provider to review them with you.             CONTINUE taking these medications    albuterol (2.5 MG/3ML) 0.083% nebulizer solution  Commonly known as: PROVENTIL     Eliquis 5 MG Tabs tablet  Generic drug: apixaban     furosemide 40 MG tablet  Commonly known as: LASIX     hydrOXYzine 25 MG capsule  Commonly known as: VISTARIL     hydrOXYzine 25 MG tablet  Commonly known as: ATARAX     pregabalin 50 MG capsule  Commonly known as: LYRICA     Symbicort 160-4.5 MCG/ACT Aero  Generic drug: budesonide-formoterol     traMADol 50 MG tablet  Commonly known as: ULTRAM     zolpidem 10 MG tablet  Commonly known as: AMBIEN           Where to Get Your Medications      These medications were sent to  [facility] [phone] - F [phone]  [address] [place] [address]    Phone: [phone]   · predniSONE 10 MG tablet  · predniSONE 10 MG tablet  · predniSONE 20 MG tablet  · predniSONE 5 MG tablet         Discharge Procedure Orders   External Referral To Pulmonary Rehab   Referral Priority: Routine Referral Type: Eval and Treat   Referral Reason: Specialty Services Required   Requested Specialty: Rehabilitation   Number of Visits Requested: 1       Time Spent on discharge is  31 mins in patient examination, evaluation, counseling as well as medication reconciliation, prescriptions for required medications, discharge plan and follow up. Electronically signed by   DANIEL Richards NP  5/12/2021  3:27 PM      Thank you DANIEL Buckley - ASHA for the opportunity to be involved in this patient's care.

## 2021-05-12 NOTE — PROGRESS NOTES
Physical Therapy  Facility/Department: [facility] SURG ICU  Daily Treatment Note  NAME: Ulices Killian  : 1965  MRN: [id number]    Date of Service: 2021    Discharge Recommendations:  Patient would benefit from continued therapy after discharge   PT Equipment Recommendations  Equipment Needed: No  Other: Pt owns walker- will benefit from use of device at all times    Assessment   Body structures, Functions, Activity limitations: Decreased functional mobility ; Decreased strength;Decreased endurance;Decreased balance  Assessment: Pt able to ambulate a household distance with use of walker and SBA today on room air. Pt will benefit from use of device at all times and would benefit from further therapy for his overall endurance deficits. Prognosis: Good  PT Education: Transfer Training;Functional Mobility Training;Gait Training;General Safety;Home Exercise Program  REQUIRES PT FOLLOW UP: Yes  Activity Tolerance  Activity Tolerance: Patient limited by endurance     Patient Diagnosis(es): The encounter diagnosis was COPD exacerbation ([facility] has a past medical history of COPD (chronic obstructive pulmonary disease) ([facility] and Diabetes mellitus ([facility] has no past surgical history on file. Restrictions  Restrictions/Precautions  Restrictions/Precautions: Fall Risk, Up as Tolerated  Required Braces or Orthoses?: No  Position Activity Restriction  Other position/activity restrictions: \"Can increase activity level as tolerated as long as O2 saturation maintained above 92% and as tolerated. \"  Subjective   General  Response To Previous Treatment: Patient with no complaints from previous session. Family / Caregiver Present: No  Subjective  Subjective: Pt up to chair upon arrival and agreeable to therapy. Pt denies any pain at this time. Pt quiet today during session.   Pain Screening  Patient Currently in Pain: Denies  Vital Signs  Patient Currently in Pain: Denies       Orientation  Orientation  Overall Orientation Status: Within Functional Limits  Cognition   Cognition  Overall Cognitive Status: WFL  Objective   Bed mobility  Supine to Sit: (Pt up to chair upon arrival)  Sit to Supine: (Pt retired to chair at end of session)  Scooting: Stand by assistance(Scooting assessed in chair)  Transfers  Sit to Stand: Stand by assistance  Stand to sit: Stand by assistance  Comment: Good hand placement without cues  Ambulation  Ambulation?: Yes  More Ambulation?: Yes  Ambulation 1  Surface: level tile  Device: Rolling Walker  Other Apparatus: (room air)  Assistance: Stand by assistance  Quality of Gait: improving endurance with good use of breathing techniques once cued initially, decreased step length and gait speed overall  Gait Deviations: Slow Ashley;Decreased step length  Distance: 250ft, additional 60ft later in session  Comments: Pt's oxygen saturation remained in 90s throughout ambulation on room air (RT present monitoring oxygen saturation)  Ambulation 2  Surface - 2: level tile  Device 2: Rollator  Assistance 2: Contact guard assistance  Quality of Gait 2: decreased step length, decreased gait speed, forward flexed, increase in unsteadiness compared to RW  Gait Deviations: Slow Ashley;Decreased step length  Distance: 60ft  Stairs/Curb  Stairs?: Yes  Stairs  # Steps : 2  Stairs Height: 6\"  Rails: Bilateral  Device: No Device  Assistance: Contact guard assistance  Comment: Pt discusses with pt what he uses for UE support to enter the home as he doesn't have rails but is able to use bilateral UE support. Balance  Posture: Fair  Sitting - Static: Good  Sitting - Dynamic: Good  Standing - Static: Good;-  Standing - Dynamic: Fair;+  Comments: standing balance assessed with Rollator  Exercises  Comments: Pt declines performing today but accepts seated and standing exercise HEP. Pt demonstrates and verbalizes understanding of seated HEP for LAQ, marches, ankle pumps and hip abduction.  Pt then educated on standing HEP

## 2021-05-12 NOTE — PROGRESS NOTES
[facility]  Office: [address], DO, Nathan Medina, DO, Nasjermain Giron, DO, Meliza Topete, DO, Jarret Copeland MD, Edgar Carpenter MD, Jarret Chan MD, Rema Villafana MD, Nikolai Peters MD, Nury Vee MD, Jm Pickard MD, Paige Jones MD, Fabi Salinas DO, Nyasia Kessler MD, Archana Patiño DO, Heath Dickens MD,  Toby Mccurdy DO, Ko Mckeon MD, Derald Jeans, MD, Ciro Barclay MD, Caroline Glass MD, Griselda Botello, [facility], [facility] Jonatan, [facility], John Burnham CNP, Cate Ta, CNS, Rebecca Arnold, CNP, Jocelyn Lincoln, CNP, Lyn Ashraf, CNP, Mulu Arguello, CNP, Adonis Kelley, CNP, Marshall Corrales PA-C, Eduardo Nicholson, [facility], Amelie Mohan, CNP, Kalani Snyder, CNP, Mika Walker, CNP, Antoine Holter, CNP, Paxton Dixon, CNP, [address]    Progress Note    5/12/2021    10:56 AM    Name:   Rosalinda Haynes  MRN:     [id number]     [place]:      [de-identified]   Room:   [address] [place] [address] Day:  3  Admit Date:  5/9/2021  2:24 AM    PCP:   Paz Soulier, APRN - CNP  Code Status:  Full Code    Subjective:     C/C: Cough  Interval History Status: improved. Patient resting in chair. He reports cough remains although it is nonproductive. He denies feeling short of breath. He is currently on room air at rest.  No complaints of chest pain, abdominal pain, nausea, vomiting, headache, dizziness. Brief History:     Patient is a 72-year-old  male who presented to the hospital for management of acute COPD exacerbation. Prior to coming to the ED he woke up abruptly short of breath. He says he has had problems with shortness of breath and COPD for years however it was much worse prior to coming to the ED. He had noticed some wheezing but denied cough, fever, chills. He does not follow with a pulmonologist.  He has chronic lower leg swelling.   Is a significant medical history of COPD, clotting disorder with history of DVT, long-term anticoagulation, diabetes, GERD, sleep apnea, and tobacco use. CT PE chest was completed and revealed no evidence of central or proximal PE. He was hypoxic while in the ED at 88% placed on 2 L per nasal cannula. He was given IV Solu-Medrol and continued home Symbicort and Eliquis for anticoagulation. He has worn BiPAP at night. He has been advised to stop smoking. Pulmonary evaluated patient and recommendations were made. Patient has been improving with IV steroid and BiPAP and respiratory treatments. Patient to have CPAP delivered to his home today. Patient on room air at rest at this time. Will complete home O2 eval prior to discharge. Review of Systems:     Constitutional:  negative for chills, fevers, sweats  Respiratory:  negative for  dyspnea on exertion, shortness of breath,  + wheezing (improved), + nonproductive cough  Cardiovascular:  negative for chest pain, chest pressure/discomfort, palpitations, + BLE swelling (chronic)  Gastrointestinal:  negative for abdominal pain, constipation, diarrhea, nausea, vomiting  Neurological:  negative for dizziness, headache    Medications:      Allergies:  No Known Allergies    Current Meds:   Scheduled Meds:    methylPREDNISolone  40 mg Intravenous Q12H    sodium chloride flush  5-40 mL Intravenous 2 times per day    famotidine  20 mg Oral BID    ipratropium-albuterol  1 ampule Inhalation Q4H WA    apixaban  5 mg Oral BID    budesonide-formoterol  2 puff Inhalation BID    pregabalin  50 mg Oral TID    insulin lispro  0-12 Units Subcutaneous TID WC    insulin lispro  0-6 Units Subcutaneous Nightly     Continuous Infusions:    sodium chloride      dextrose       PRN Meds: sodium chloride flush, sodium chloride flush, sodium chloride, promethazine **OR** ondansetron, nicotine, polyethylene glycol, acetaminophen **OR** acetaminophen, albuterol, glucose, dextrose, glucagon (rDNA), dextrose, traMADol, LORazepam, LORazepam    Data:     Past Medical History:   has a past medical history of COPD (chronic obstructive pulmonary disease) ([facility] and Diabetes mellitus ([facility] Social History:   reports that he has been smoking cigarettes. He has a 25.00 pack-year smoking history. He has never used smokeless tobacco. He reports previous alcohol use. He reports previous drug use. Family History:   Family History   Problem Relation Age of Onset    Lung Cancer Mother        Vitals:  BP (!) 141/88   Pulse 70   Temp 98.2 °F (36.8 °C) (Oral)   Resp 14   Ht 6' 3\" (1.905 m)   Wt (!) 341 lb 11.4 oz (155 kg)   SpO2 91%   BMI 42.71 kg/m²   Temp (24hrs), Av.2 °F (36.8 °C), Min:98 °F (36.7 °C), Max:98.6 °F (37 °C)    Recent Labs     21  0751 21  1103 21  1558 21  2107   POCGLU 192* 204* 235* 263*       I/O (24Hr):     Intake/Output Summary (Last 24 hours) at 2021 1056  Last data filed at 2021 0600  Gross per 24 hour   Intake 480 ml   Output 1450 ml   Net -970 ml       Labs:  Hematology:  Recent Labs     05/10/21  0544 21  0543 21  0602   WBC 15.1* 14.6* 10.5   RBC 4.81 4.70 4.68   HGB 13.9 13.7 13.7   HCT 43.8 42.6 42.6   MCV 91.1 90.5 91.1   MCH 29.0 29.1 29.3   MCHC 31.8 32.1 32.1   RDW 17.2* 17.6* 17.7*    188 192   MPV 9.8 10.2 10.4     Chemistry:  Recent Labs     05/10/21  0544 21  0728    137   K 4.5 4.7   * 105   CO2 22 24   GLUCOSE 177* 198*   BUN 22* 23*   CREATININE 1.02 0.84   ANIONGAP 11 8*   LABGLOM >60 >60   GFRAA >60 >60   CALCIUM 9.6 9.0     Recent Labs     05/10/21  1634 05/10/21  2016 21  0751 21  1103 21  1558 21  2107   POCGLU 324* 351* 192* 204* 235* 263*       Lab Results   Component Value Date/Time    SPECIAL NOT REPORTED 05/10/2021 09:35 AM     Lab Results   Component Value Date/Time    CULTURE NORMAL RESPIRATORY CARLOS ALBERTO HEAVY GROWTH 05/10/2021 09:35 AM       Radiology:  Ankit Almazan Chest Portable    Result Date: medically stable for discharge home this afternoon.     DANIEL Zendejas NP  5/12/2021  10:56 AM

## 2021-05-12 NOTE — PROGRESS NOTES
Occupational Therapy  Facility/Department: [facility] SURG ICU  Daily Treatment Note  NAME: Rayna Horan  : 1965  MRN: [id number]    Date of Service: 2021     C/C: Cough    Discharge Recommendations:  Patient would benefit from continued therapy after discharge    OT Equipment Recommendations  Equipment Needed: No  Other: Pt reported he has a RW at home    Assessment   Performance deficits / Impairments: Decreased functional mobility ; Decreased ADL status; Decreased endurance;Decreased high-level IADLs;Decreased safe awareness  Comments: Pt educated on energy conservation strategies and HEP to increase activity tolerance to safely engage in functional tasks when returning to prior living arrangements. Pt verbalized understanding with positive feedback. Pt would benefit from continued skilled OT services upon discharge due to the above noted deficiets to maximize independence in functional mobility/transfers and basic ADLs. Prognosis: Good  Decision Making: Medium Complexity  OT Education: Home Exercise Program;Plan of Care;Energy Conservation  REQUIRES OT FOLLOW UP: Yes  Activity Tolerance  Activity Tolerance: Patient Tolerated treatment well  Safety Devices  Safety Devices in place: Yes  Type of devices: Call light within reach;Gait belt;Left in chair;Nurse notified  Restraints  Initially in place: No         Patient Diagnosis(es): The encounter diagnosis was COPD exacerbation ([facility] has a past medical history of COPD (chronic obstructive pulmonary disease) ([facility] and Diabetes mellitus ([facility] has no past surgical history on file.     Restrictions  Restrictions/Precautions  Restrictions/Precautions: Fall Risk, Up as Tolerated  Required Braces or Orthoses?: No  Position Activity Restriction    Oxygen Therapy  Comments: SPO2 saturation 90% functional mobility; 94-93% when seated in recliner chair; O2 saturation monitored throughout     Subjective   General  Patient assessed for rehabilitation services?: Yes  Response to previous treatment: Patient with no complaints from previous session  Family / Caregiver Present: No  General Comment  Comments: RN ok'd for tx this PM. Pt agreeable to participate in session following encouragement, pt cooperative throughout. Patient Currently in Pain: Denies     Orientation  Orientation  Overall Orientation Status: Within Functional Limits     Objective    ADL  Grooming: Supervision(Pt performed facial washing and hair combing seated unsupported in recliner chair)  UE Dressing: Supervision(Pt doff both hosptial gowns and donned clean shirt seated unsupported in recliner chair)  LE Dressing: Stand by assistance(Pt threaded clean shorts seated unsupported in recliner chair; pt completed task by donning shorts standing at recliner chair)  Additional Comments: Pt was compliant w/encouragment to complete ADL participation tasks      Balance  Sitting Balance: Supervision(Pt seated in recliner chair unsupported ~15mins)  Standing Balance: Contact guard assistance  Standing Balance  Time: ~2mins  Activity: pt performed dynamic standing at recliner chair w/RW for balance assistance to perform LB dressing  Comment: Pt saturation O2 ~93-94% during dynamic standing; pt encouraged to deep/pursed lip breathing throughout activity, pt demo good ability to incorporate these techniques    Functional Mobility  Functional - Mobility Device: Rolling Walker  Activity: Other(pt performed functional mobility in hallway and pt room)  Assist Level: Contact guard assistance  Functional Mobility Comments: Pt compliant to participate in functional mobility however required encouragement. Pt steady with no LOB throughout.      Bed mobility  Scooting: Stand by assistance  Comment: Pt up in recliner chair upon arrival; pt in recliner chair end of today's session     Transfers  Sit to stand: Contact guard assistance  Stand to sit: Contact guard assistance  Transfer Comments: Pt performed sit>stand at recliner chair w/RW; pt perfomed stand>sit at recliner chair w/RW; pt required encouragement and increased time/effort        Cognition  Overall Cognitive Status: WFL    Orientation  Overall Orientation Status: WFL     Plan   Plan  Times per week: 5-6x/wk  Times per day: Daily  Current Treatment Recommendations: Balance Training, Functional Mobility Training, Endurance Training, Safety Education & Training, Self-Care / ADL, Home Management Training, Strengthening, ROM, Patient/Caregiver Education & Training    AM-PAC Score  AM-PAC Inpatient Daily Activity Raw Score: 17 (05/10/21 1506)  AM-PAC Inpatient ADL T-Scale Score : 37.26 (05/10/21 1506)  ADL Inpatient CMS 0-100% Score: 50.11 (05/10/21 1506)  ADL Inpatient CMS G-Code Modifier : CK (05/10/21 1506)    Goals  Short term goals  Time Frame for Short term goals: 14 visits  Short term goal 1: Pt will demo functional mobility/transfers w/mod IND using LRD w/no LOB  Short term goal 2: Pt will demo 10mins of activity tolerance to increase engagement in ADL/functional tasks  Short term goal 3: Pt will demo LB ADLs w/mod IND use of AE/DME PRN  Short term goal 4: Pt will demo BUE strengthing HEP to increase participation in functional tasks/ADLs  Short term goal 5: Pt will independently demo good safety awareness throughout engagement in all ADLs/functional tasks  Short term goal 6: Pt will independently demo ability to incorporate energy conservation techniques as needed during engagement in all functional tasks       Therapy Time   Individual Concurrent Group Co-treatment   Time In 1329         Time Out 1358         Minutes 29         Timed Code Treatment Minutes: BILLY [address] S/OT

## 2021-05-12 NOTE — CARE COORDINATION
[facility] ICU Quality Flow/Interdisciplinary Rounds Progress Note    Quality Flow Rounds held on May 11, 2021 at [address] Attending:  Bedside Nurse, , Nursing Unit Leadership, Dietary, Physical Therapy, Occupational Therapy and Spiritual Care/    Anticipated Discharge Date:  Expected Discharge Date: 05/13/21    Anticipated Discharge Disposition:    Readmission Risk              Risk of Unplanned Readmission:        13           Discussed patient goal for the day, patient clinical progression, and barriers to discharge.   The following Goal(s) of the Day/Commitment(s) have been identified:  Activity Progression  With PT/OT, Pulse Ox - Room Air and Pulse Ox - With Ambulation      [facility] RYANNE GRIER  May 11, 2021
[facility] ICU Quality Flow/Interdisciplinary Rounds Progress Note    Quality Flow Rounds held on May 10, 2021 at [address] Attending:  Bedside Nurse, , Nursing Unit Leadership, Dietary, Physical Therapy and Occupational Therapy    Anticipated Discharge Date:  Expected Discharge Date: 05/13/21    Anticipated Discharge Disposition:    Readmission Risk              Risk of Unplanned Readmission:        13           Discussed patient goal for the day, patient clinical progression, and barriers to discharge.   The following Goal(s) of the Day/Commitment(s) have been identified:  Activity Progression  with PT/OT, Pulse Ox - Room Air and Pulse Ox - With Ambulation      [facility] RYANNE GRIER  May 10, 2021
[facility] ICU Quality Flow/Interdisciplinary Rounds Progress Note    Quality Flow Rounds held on May 12, 2021 at [address] Attending:  Bedside Nurse, , Nursing Unit Leadership, Dietary, Respiratory Therapy, Physical Therapy, Occupational Therapy and Spiritual Care/    Anticipated Discharge Date:  Expected Discharge Date: 05/13/21    Anticipated Discharge Disposition: home    Readmission Risk              Risk of Unplanned Readmission:        13           Discussed patient goal for the day, patient clinical progression, and barriers to discharge.   The following Goal(s) of the Day/Commitment(s) have been identified:  Activity Progression  with PT/OT, Oxygen - Arrange Home O2, Pulse Ox - Room Air and Pulse Ox - With Ambulation      [facility] RYANNE GRIER  May 12, 2021
TRANSITIONAL CARE PLANNING/ 2 Rehab Matt Day: 3    Reason for Admission: COPD (chronic obstructive pulmonary disease) ([facility]) [J44.9]  COPD exacerbation ([address] [J44.1]     Treatment Plan of Care: pulmonary - resp failure    Tests/Procedures still needed: home O2 eval    Barriers to Discharge: same    Readmission Risk              Risk of Unplanned Readmission:        13      Patient goals/Treatment Preferences/Transitional Plan: home with sister    Referrals Made: none    Follow Up needed: home O2 eval/orders if indicated - will use [facility]
signed by Phillip Causey RN on 5/10/21 at 11:58 AM EDT

## 2021-05-13 ENCOUNTER — TELEPHONE (OUTPATIENT)
Dept: PULMONOLOGY | Age: 56
End: 2021-05-13

## 2021-05-13 ENCOUNTER — CARE COORDINATION (OUTPATIENT)
Dept: CASE MANAGEMENT | Age: 56
End: 2021-05-13

## 2021-05-13 NOTE — CARE COORDINATION
Nikky 45 Transitions Initial Follow Up Call    Call within 2 business days of discharge: Yes    Patient: Kassie Longo Patient : 1965   MRN: [id number]  Reason for Admission: COPD exacerbation  Discharge Date: 21 RARS: Readmission Risk Score: 14      Last Discharge [facility]       Complaint Diagnosis Description Type Department Provider    21 difficulty breathing COPD exacerbation ([address] . .. ED to Hosp-Admission (Discharged) (ADMITTED) TRICE MONTAÑO MS Sonali Irvin MD; Mireille Oliver. .. Attempted initial 24 hour transitional call to patient. Left VM to return call directly to [phone]. 1st attempt. Facility: [facility]  Follow Up  No future appointments.     Naima Niño RN

## 2021-05-13 NOTE — TELEPHONE ENCOUNTER
----- Message from Austin Godwin MA sent at 5/13/2021  8:25 AM EDT -----  Merrill Carrasco please message from Dr. Hannah Crocker and call to schedule. Thanks!!!!!!!!!!!!!!!!  ----- Message -----  From: Jane Hardy MD  Sent: 5/12/2021   8:50 PM EDT  To: [facility] Respiratory Spec Clinical Staff    Please have the patient see me in the office as requested. Patient may be a new patient.   Thank you  ----- Message -----  From: April Espinal MD  Sent: 5/12/2021   7:17 PM EDT  To: Jane Hardy MD

## 2021-05-14 ENCOUNTER — CARE COORDINATION (OUTPATIENT)
Dept: CASE MANAGEMENT | Age: 56
End: 2021-05-14

## 2021-05-14 NOTE — CARE COORDINATION
Nikky 45 Transitions Initial Follow Up Call    Call within 2 business days of discharge: Yes    Patient: Joel Espino Patient : 1965   MRN: [id number]  Reason for Admission: COPD  Discharge Date: 21 RARS: Readmission Risk Score: 14      Last Discharge [facility]       Complaint Diagnosis Description Type Department Provider    21 difficulty breathing COPD exacerbation ([address] . .. ED to Hosp-Admission (Discharged) (ADMITTED) TRICE MONTAÑO MS Clare Tavares MD; Donovan Louise. .. Attempted initial 24 hour transitional call to patient. Left VM to return call directly to [phone]. Final attempt. Facility: [facility]    Follow Up  No future appointments.     Orquidea Andino RN

## 2021-06-09 ENCOUNTER — HOSPITAL ENCOUNTER (INPATIENT)
Age: 56
LOS: 3 days | Discharge: HOME | DRG: 140 | End: 2021-06-12
Attending: EMERGENCY MEDICINE | Admitting: FAMILY MEDICINE
Payer: MEDICAID

## 2021-06-09 ENCOUNTER — APPOINTMENT (OUTPATIENT)
Dept: GENERAL RADIOLOGY | Age: 56
DRG: 140 | End: 2021-06-09
Payer: MEDICAID

## 2021-06-09 DIAGNOSIS — E11.8 CONTROLLED TYPE 2 DIABETES MELLITUS WITH COMPLICATION, WITHOUT LONG-TERM CURRENT USE OF INSULIN (HCC): ICD-10-CM

## 2021-06-09 DIAGNOSIS — Z79.899 LONG TERM CURRENT USE OF DIURETIC: ICD-10-CM

## 2021-06-09 DIAGNOSIS — R60.0 LOCALIZED EDEMA: ICD-10-CM

## 2021-06-09 DIAGNOSIS — J44.1 COPD EXACERBATION (HCC): Primary | ICD-10-CM

## 2021-06-09 LAB
ABSOLUTE EOS #: 0.3 K/UL (ref 0–0.4)
ABSOLUTE IMMATURE GRANULOCYTE: ABNORMAL K/UL (ref 0–0.3)
ABSOLUTE LYMPH #: 1.5 K/UL (ref 1–4.8)
ABSOLUTE MONO #: 0.5 K/UL (ref 0.1–1.2)
ANION GAP SERPL CALCULATED.3IONS-SCNC: 6 MMOL/L (ref 9–17)
BASOPHILS # BLD: 2 % (ref 0–2)
BASOPHILS ABSOLUTE: 0.1 K/UL (ref 0–0.2)
BNP INTERPRETATION: NORMAL
BUN BLDV-MCNC: 16 MG/DL (ref 6–20)
BUN/CREAT BLD: ABNORMAL (ref 9–20)
CALCIUM SERPL-MCNC: 9.1 MG/DL (ref 8.6–10.4)
CHLORIDE BLD-SCNC: 109 MMOL/L (ref 98–107)
CO2: 26 MMOL/L (ref 20–31)
CREAT SERPL-MCNC: 0.82 MG/DL (ref 0.7–1.2)
DIFFERENTIAL TYPE: ABNORMAL
EOSINOPHILS RELATIVE PERCENT: 5 % (ref 1–4)
GFR AFRICAN AMERICAN: >60 ML/MIN
GFR NON-AFRICAN AMERICAN: >60 ML/MIN
GFR SERPL CREATININE-BSD FRML MDRD: ABNORMAL ML/MIN/{1.73_M2}
GFR SERPL CREATININE-BSD FRML MDRD: ABNORMAL ML/MIN/{1.73_M2}
GLUCOSE BLD-MCNC: 158 MG/DL (ref 70–99)
GLUCOSE BLD-MCNC: 202 MG/DL (ref 75–110)
HCT VFR BLD CALC: 41.8 % (ref 41–53)
HEMOGLOBIN: 13.4 G/DL (ref 13.5–17.5)
IMMATURE GRANULOCYTES: ABNORMAL %
LYMPHOCYTES # BLD: 23 % (ref 24–44)
MCH RBC QN AUTO: 29.7 PG (ref 26–34)
MCHC RBC AUTO-ENTMCNC: 32.2 G/DL (ref 31–37)
MCV RBC AUTO: 92.4 FL (ref 80–100)
MONOCYTES # BLD: 8 % (ref 2–11)
NRBC AUTOMATED: ABNORMAL PER 100 WBC
PDW BLD-RTO: 17 % (ref 12.5–15.4)
PLATELET # BLD: 244 K/UL (ref 140–450)
PLATELET ESTIMATE: ABNORMAL
PMV BLD AUTO: 8.7 FL (ref 6–12)
POTASSIUM SERPL-SCNC: 4.5 MMOL/L (ref 3.7–5.3)
PRO-BNP: 99 PG/ML
RBC # BLD: 4.52 M/UL (ref 4.5–5.9)
RBC # BLD: ABNORMAL 10*6/UL
SARS-COV-2, RAPID: NOT DETECTED
SEG NEUTROPHILS: 62 % (ref 36–66)
SEGMENTED NEUTROPHILS ABSOLUTE COUNT: 4.1 K/UL (ref 1.8–7.7)
SODIUM BLD-SCNC: 141 MMOL/L (ref 135–144)
SPECIMEN DESCRIPTION: NORMAL
TROPONIN INTERP: NORMAL
TROPONIN T: NORMAL NG/ML
TROPONIN, HIGH SENSITIVITY: 16 NG/L (ref 0–22)
WBC # BLD: 6.6 K/UL (ref 3.5–11)
WBC # BLD: ABNORMAL 10*3/UL

## 2021-06-09 PROCEDURE — 85025 COMPLETE CBC W/AUTO DIFF WBC: CPT

## 2021-06-09 PROCEDURE — 6360000002 HC RX W HCPCS: Performed by: NURSE PRACTITIONER

## 2021-06-09 PROCEDURE — 1210000000 HC MED SURG R&B

## 2021-06-09 PROCEDURE — 93005 ELECTROCARDIOGRAM TRACING: CPT | Performed by: EMERGENCY MEDICINE

## 2021-06-09 PROCEDURE — 94640 AIRWAY INHALATION TREATMENT: CPT

## 2021-06-09 PROCEDURE — 71045 X-RAY EXAM CHEST 1 VIEW: CPT

## 2021-06-09 PROCEDURE — 96374 THER/PROPH/DIAG INJ IV PUSH: CPT

## 2021-06-09 PROCEDURE — 6360000002 HC RX W HCPCS: Performed by: EMERGENCY MEDICINE

## 2021-06-09 PROCEDURE — 6370000000 HC RX 637 (ALT 250 FOR IP): Performed by: NURSE PRACTITIONER

## 2021-06-09 PROCEDURE — 2580000003 HC RX 258: Performed by: NURSE PRACTITIONER

## 2021-06-09 PROCEDURE — 99285 EMERGENCY DEPT VISIT HI MDM: CPT

## 2021-06-09 PROCEDURE — 87635 SARS-COV-2 COVID-19 AMP PRB: CPT

## 2021-06-09 PROCEDURE — 80048 BASIC METABOLIC PNL TOTAL CA: CPT

## 2021-06-09 PROCEDURE — 82947 ASSAY GLUCOSE BLOOD QUANT: CPT

## 2021-06-09 PROCEDURE — 94660 CPAP INITIATION&MGMT: CPT

## 2021-06-09 PROCEDURE — 84484 ASSAY OF TROPONIN QUANT: CPT

## 2021-06-09 PROCEDURE — 83880 ASSAY OF NATRIURETIC PEPTIDE: CPT

## 2021-06-09 PROCEDURE — 36415 COLL VENOUS BLD VENIPUNCTURE: CPT

## 2021-06-09 RX ORDER — FUROSEMIDE 10 MG/ML
20 INJECTION INTRAMUSCULAR; INTRAVENOUS ONCE
Status: COMPLETED | OUTPATIENT
Start: 2021-06-09 | End: 2021-06-09

## 2021-06-09 RX ORDER — NICOTINE POLACRILEX 4 MG
15 LOZENGE BUCCAL PRN
Status: DISCONTINUED | OUTPATIENT
Start: 2021-06-09 | End: 2021-06-12 | Stop reason: HOSPADM

## 2021-06-09 RX ORDER — DEXTROSE MONOHYDRATE 25 G/50ML
12.5 INJECTION, SOLUTION INTRAVENOUS PRN
Status: DISCONTINUED | OUTPATIENT
Start: 2021-06-09 | End: 2021-06-12 | Stop reason: HOSPADM

## 2021-06-09 RX ORDER — ONDANSETRON 4 MG/1
4 TABLET, ORALLY DISINTEGRATING ORAL EVERY 8 HOURS PRN
Status: DISCONTINUED | OUTPATIENT
Start: 2021-06-09 | End: 2021-06-12 | Stop reason: HOSPADM

## 2021-06-09 RX ORDER — AZITHROMYCIN 250 MG/1
500 TABLET, FILM COATED ORAL DAILY
Status: COMPLETED | OUTPATIENT
Start: 2021-06-10 | End: 2021-06-11

## 2021-06-09 RX ORDER — IPRATROPIUM BROMIDE AND ALBUTEROL SULFATE 2.5; .5 MG/3ML; MG/3ML
1 SOLUTION RESPIRATORY (INHALATION)
Status: DISCONTINUED | OUTPATIENT
Start: 2021-06-09 | End: 2021-06-12 | Stop reason: HOSPADM

## 2021-06-09 RX ORDER — POLYETHYLENE GLYCOL 3350 17 G/17G
17 POWDER, FOR SOLUTION ORAL DAILY PRN
Status: DISCONTINUED | OUTPATIENT
Start: 2021-06-09 | End: 2021-06-12 | Stop reason: HOSPADM

## 2021-06-09 RX ORDER — SODIUM CHLORIDE 9 MG/ML
INJECTION, SOLUTION INTRAVENOUS CONTINUOUS
Status: DISCONTINUED | OUTPATIENT
Start: 2021-06-09 | End: 2021-06-09

## 2021-06-09 RX ORDER — MAGNESIUM SULFATE IN WATER 40 MG/ML
2000 INJECTION, SOLUTION INTRAVENOUS ONCE
Status: COMPLETED | OUTPATIENT
Start: 2021-06-09 | End: 2021-06-09

## 2021-06-09 RX ORDER — ALBUTEROL SULFATE 2.5 MG/3ML
2.5 SOLUTION RESPIRATORY (INHALATION) EVERY 6 HOURS PRN
Status: DISCONTINUED | OUTPATIENT
Start: 2021-06-09 | End: 2021-06-09

## 2021-06-09 RX ORDER — ONDANSETRON 2 MG/ML
4 INJECTION INTRAMUSCULAR; INTRAVENOUS EVERY 6 HOURS PRN
Status: DISCONTINUED | OUTPATIENT
Start: 2021-06-09 | End: 2021-06-12 | Stop reason: HOSPADM

## 2021-06-09 RX ORDER — PREGABALIN 25 MG/1
50 CAPSULE ORAL 3 TIMES DAILY
Status: DISCONTINUED | OUTPATIENT
Start: 2021-06-09 | End: 2021-06-12 | Stop reason: HOSPADM

## 2021-06-09 RX ORDER — OXYCODONE HYDROCHLORIDE AND ACETAMINOPHEN 5; 325 MG/1; MG/1
1 TABLET ORAL EVERY 4 HOURS PRN
Status: COMPLETED | OUTPATIENT
Start: 2021-06-09 | End: 2021-06-10

## 2021-06-09 RX ORDER — METHYLPREDNISOLONE SODIUM SUCCINATE 40 MG/ML
40 INJECTION, POWDER, LYOPHILIZED, FOR SOLUTION INTRAMUSCULAR; INTRAVENOUS EVERY 6 HOURS
Status: DISCONTINUED | OUTPATIENT
Start: 2021-06-09 | End: 2021-06-10

## 2021-06-09 RX ORDER — SODIUM CHLORIDE 9 MG/ML
25 INJECTION, SOLUTION INTRAVENOUS PRN
Status: DISCONTINUED | OUTPATIENT
Start: 2021-06-09 | End: 2021-06-12 | Stop reason: HOSPADM

## 2021-06-09 RX ORDER — SODIUM CHLORIDE 0.9 % (FLUSH) 0.9 %
5-40 SYRINGE (ML) INJECTION PRN
Status: DISCONTINUED | OUTPATIENT
Start: 2021-06-09 | End: 2021-06-12 | Stop reason: HOSPADM

## 2021-06-09 RX ORDER — SODIUM CHLORIDE 9 MG/ML
INJECTION, SOLUTION INTRAVENOUS CONTINUOUS
Status: DISCONTINUED | OUTPATIENT
Start: 2021-06-09 | End: 2021-06-10

## 2021-06-09 RX ORDER — DEXTROSE MONOHYDRATE 50 MG/ML
100 INJECTION, SOLUTION INTRAVENOUS PRN
Status: DISCONTINUED | OUTPATIENT
Start: 2021-06-09 | End: 2021-06-12 | Stop reason: HOSPADM

## 2021-06-09 RX ORDER — ALBUTEROL SULFATE 2.5 MG/3ML
2.5 SOLUTION RESPIRATORY (INHALATION)
Status: DISCONTINUED | OUTPATIENT
Start: 2021-06-09 | End: 2021-06-12 | Stop reason: HOSPADM

## 2021-06-09 RX ORDER — SODIUM CHLORIDE 0.9 % (FLUSH) 0.9 %
5-40 SYRINGE (ML) INJECTION EVERY 12 HOURS SCHEDULED
Status: DISCONTINUED | OUTPATIENT
Start: 2021-06-09 | End: 2021-06-12 | Stop reason: HOSPADM

## 2021-06-09 RX ORDER — BUDESONIDE AND FORMOTEROL FUMARATE DIHYDRATE 160; 4.5 UG/1; UG/1
2 AEROSOL RESPIRATORY (INHALATION) 2 TIMES DAILY
Status: DISCONTINUED | OUTPATIENT
Start: 2021-06-09 | End: 2021-06-12 | Stop reason: HOSPADM

## 2021-06-09 RX ORDER — HYDROXYZINE HYDROCHLORIDE 25 MG/1
25 TABLET, FILM COATED ORAL 3 TIMES DAILY PRN
Status: DISCONTINUED | OUTPATIENT
Start: 2021-06-09 | End: 2021-06-12 | Stop reason: HOSPADM

## 2021-06-09 RX ORDER — FUROSEMIDE 20 MG/1
40 TABLET ORAL DAILY
Status: DISCONTINUED | OUTPATIENT
Start: 2021-06-09 | End: 2021-06-12 | Stop reason: HOSPADM

## 2021-06-09 RX ORDER — ACETAMINOPHEN 650 MG/1
650 SUPPOSITORY RECTAL EVERY 6 HOURS PRN
Status: DISCONTINUED | OUTPATIENT
Start: 2021-06-09 | End: 2021-06-12 | Stop reason: HOSPADM

## 2021-06-09 RX ORDER — ZOLPIDEM TARTRATE 5 MG/1
10 TABLET ORAL NIGHTLY PRN
Status: DISCONTINUED | OUTPATIENT
Start: 2021-06-09 | End: 2021-06-12 | Stop reason: HOSPADM

## 2021-06-09 RX ORDER — ACETAMINOPHEN 325 MG/1
650 TABLET ORAL EVERY 6 HOURS PRN
Status: DISCONTINUED | OUTPATIENT
Start: 2021-06-09 | End: 2021-06-12 | Stop reason: HOSPADM

## 2021-06-09 RX ORDER — PREDNISONE 20 MG/1
40 TABLET ORAL DAILY
Status: DISCONTINUED | OUTPATIENT
Start: 2021-06-12 | End: 2021-06-10

## 2021-06-09 RX ORDER — OXYCODONE HYDROCHLORIDE AND ACETAMINOPHEN 5; 325 MG/1; MG/1
2 TABLET ORAL EVERY 4 HOURS PRN
Status: COMPLETED | OUTPATIENT
Start: 2021-06-09 | End: 2021-06-10

## 2021-06-09 RX ADMIN — IPRATROPIUM BROMIDE AND ALBUTEROL SULFATE 1 AMPULE: .5; 2.5 SOLUTION RESPIRATORY (INHALATION) at 19:34

## 2021-06-09 RX ADMIN — METHYLPREDNISOLONE SODIUM SUCCINATE 40 MG: 40 INJECTION, POWDER, FOR SOLUTION INTRAMUSCULAR; INTRAVENOUS at 20:04

## 2021-06-09 RX ADMIN — MAGNESIUM SULFATE HEPTAHYDRATE 2000 MG: 40 INJECTION, SOLUTION INTRAVENOUS at 16:53

## 2021-06-09 RX ADMIN — PREGABALIN 50 MG: 25 CAPSULE ORAL at 20:05

## 2021-06-09 RX ADMIN — BUDESONIDE AND FORMOTEROL FUMARATE DIHYDRATE 2 PUFF: 160; 4.5 AEROSOL RESPIRATORY (INHALATION) at 19:34

## 2021-06-09 RX ADMIN — SODIUM CHLORIDE, PRESERVATIVE FREE 10 ML: 5 INJECTION INTRAVENOUS at 20:05

## 2021-06-09 RX ADMIN — INSULIN LISPRO 1 UNITS: 100 INJECTION, SOLUTION INTRAVENOUS; SUBCUTANEOUS at 20:28

## 2021-06-09 RX ADMIN — SODIUM CHLORIDE: 9 INJECTION, SOLUTION INTRAVENOUS at 20:04

## 2021-06-09 RX ADMIN — ALBUTEROL SULFATE 2.5 MG: 2.5 SOLUTION RESPIRATORY (INHALATION) at 16:14

## 2021-06-09 RX ADMIN — OXYCODONE HYDROCHLORIDE AND ACETAMINOPHEN 1 TABLET: 5; 325 TABLET ORAL at 21:06

## 2021-06-09 RX ADMIN — FUROSEMIDE 20 MG: 10 INJECTION, SOLUTION INTRAMUSCULAR; INTRAVENOUS at 21:06

## 2021-06-09 ASSESSMENT — PAIN SCALES - GENERAL
PAINLEVEL_OUTOF10: 4
PAINLEVEL_OUTOF10: 10

## 2021-06-09 ASSESSMENT — PAIN DESCRIPTION - DESCRIPTORS
DESCRIPTORS: ACHING
DESCRIPTORS: ACHING

## 2021-06-09 ASSESSMENT — ENCOUNTER SYMPTOMS
COUGH: 1
SHORTNESS OF BREATH: 1

## 2021-06-09 ASSESSMENT — PAIN DESCRIPTION - PAIN TYPE
TYPE: ACUTE PAIN
TYPE: ACUTE PAIN
TYPE: CHRONIC PAIN

## 2021-06-09 ASSESSMENT — PAIN DESCRIPTION - LOCATION
LOCATION: LEG

## 2021-06-09 ASSESSMENT — PAIN DESCRIPTION - ORIENTATION: ORIENTATION: RIGHT;LEFT

## 2021-06-09 ASSESSMENT — PAIN DESCRIPTION - FREQUENCY: FREQUENCY: CONTINUOUS

## 2021-06-09 NOTE — ED PROVIDER NOTES
[id number] [facility] ED  [address] [facility] [address]  Phone: [phone]  Fax: [phone]        Pt Name: Josafat Jacobs  MRN: [id number]  [place] 1965  Date of evaluation: 6/9/21      CHIEF COMPLAINT     Chief Complaint   Patient presents with    Shortness of Breath         HISTORY OF PRESENT ILLNESS  (Location/Symptom, Timing/Onset, Context/Setting, Quality, Duration, Modifying Factors, Severity.)    Josafat Jacobs is a 49 y.o. male who presents with shortness of breath. This 68-year-old male was brought by EMS has a history of COPD was a smoker quitting in January states that this morning he woke up with shortness of breath that is worse with exertion denies any chest pain but states he does have some chest pressure with his shortness of breath no fever no chills and occasional cough no vomiting or diarrhea he does have some swelling of his lower extremities of which he is on Lasix exertion makes his symptoms worse nothing makes it better states he was given an aerosol but with minimal relief      REVIEW OF SYSTEMS    (2-9 systems for level 4, 10 or more for level 5)     Review of Systems   Respiratory: Positive for cough and shortness of breath. Cardiovascular: Positive for leg swelling. All other systems reviewed and are negative. PAST MEDICAL HISTORY    has a past medical history of COPD (chronic obstructive pulmonary disease) ([facility] and Diabetes mellitus ([facility] SURGICAL HISTORY      has no past surgical history on file.     CURRENTMEDICATIONS       Previous Medications    ALBUTEROL (PROVENTIL) (2.5 MG/3ML) 0.083% NEBULIZER SOLUTION    USE 1 VIAL IN NEBULIZER EVERY 6 HOURS AS NEEDED FOR WHEEZING FOR SHORTNESS OF BREATH    APIXABAN (ELIQUIS) 5 MG TABS TABLET    Take 1 tablet by mouth twice daily    BUDESONIDE-FORMOTEROL (SYMBICORT) 160-4.5 MCG/ACT AERO    Inhale 2 puffs into the lungs 2 times daily    FUROSEMIDE (LASIX) 40 MG TABLET    Take 1 tablet by mouth once Findings: No rash. Neurological:      General: No focal deficit present. Mental Status: He is alert. DIFFERENTIAL DIAGNOSIS/ MDM:     IV established by EMS they already gave the patient a DuoNeb and Solu-Medrol I will get an EKG labs imaging I will give the patient magnesium and an aerosol treatment here    DIAGNOSTIC RESULTS     EKG: All EKG's are interpreted by the Emergency Department Physician who either signs or Co-signs this chart in the absence of a cardiologist.      Interpreted by Bernice Montoya MD     Rhythm: normal sinus   Rate: 90  Axis: -22  Ectopy: none  Conduction: normal  ST Segments: no acute change  T Waves: no acute change  Q Waves: none    Clinical Impression: normal sinus rhythm with no acute changes/normal EKG. No acute infarction/ischemia noted. RADIOLOGY:        Interpretation per the Radiologist below, if available at the time of this note:    XR CHEST PORTABLE (Final result)  Result time 06/09/21 16:52:31  Final result by Shilpa Hidalgo MD (06/09/21 16:52:31)                Impression:    Stable chest without acute process. Narrative:    EXAMINATION:   ONE XRAY VIEW OF THE CHEST     6/9/2021 4:39 pm     COMPARISON:   05/09/2021     HISTORY:   Reason for Exam: chest pain   Acuity: Acute   Type of Exam: Initial   Additional signs and symptoms: SOB   Relevant Medical/Surgical History: Hx COPD and DM     FINDINGS:   The lungs are without acute focal process.  No effusion or pneumothorax. The   cardiomediastinal silhouette is normal.  The osseous structures are intact   without acute process.                    LABS:  Results for orders placed or performed during the hospital encounter of [birth year]   Basic Metabolic Panel   Result Value Ref Range    Glucose 158 (H) 70 - 99 mg/dL    BUN 16 6 - 20 mg/dL    CREATININE 0.82 0.70 - 1.20 mg/dL    Bun/Cre Ratio NOT REPORTED 9 - 20    Calcium 9.1 8.6 - 10.4 mg/dL    Sodium 141 135 - 144 mmol/L    Potassium 4.5 3.7 - Height: 6' 3\" (1.905 m)      -------------------------  BP: 135/73, Temp: 98.6 °F (37 °C), Pulse: 99, Resp: 18      RE-EVALUATION:  Patient on repeat evaluation states he does not feel better so I will contact my hospitalist for admission the patient states when he has an exacerbation of COPD it normally requires a 2 to 3-day stay in the hospital with aerosols and steroids before he is back to his baseline status given this I do feel he warrants admission to the hospital    CONSULTS:  My hospitalist is kind enough to admit the patient to her service    PROCEDURES:  None    FINAL IMPRESSION      1. COPD exacerbation (HCC)          DISPOSITION/PLAN   DISPOSITION        CONDITION ON DISPOSITION:   Stable    PATIENT REFERRED TO:  No follow-up provider specified. DISCHARGE MEDICATIONS:  New Prescriptions    No medications on file       (Please note that portions of this note were completed with a voicerecognition program.  Efforts were made to edit the dictations but occasionally words are mis-transcribed.)    Anthony Lu MD,, MD, F.A.C.E.P.   Attending Emergency Medicine Physician       Anthony Lu MD  06/09/21 8710

## 2021-06-10 ENCOUNTER — APPOINTMENT (OUTPATIENT)
Dept: GENERAL RADIOLOGY | Age: 56
DRG: 140 | End: 2021-06-10
Payer: MEDICAID

## 2021-06-10 PROBLEM — L03.90 CELLULITIS: Status: ACTIVE | Noted: 2019-11-12

## 2021-06-10 PROBLEM — E66.813 CLASS 3 SEVERE OBESITY WITH SERIOUS COMORBIDITY IN ADULT: Status: ACTIVE | Noted: 2021-06-10

## 2021-06-10 PROBLEM — J44.9 COPD (CHRONIC OBSTRUCTIVE PULMONARY DISEASE) (HCC): Status: ACTIVE | Noted: 2021-06-09

## 2021-06-10 PROBLEM — F41.9 ANXIETY: Status: ACTIVE | Noted: 2021-06-10

## 2021-06-10 PROBLEM — H54.7 VISUAL IMPAIRMENT: Status: ACTIVE | Noted: 2021-06-10

## 2021-06-10 PROBLEM — J44.1 COPD EXACERBATION (HCC): Status: ACTIVE | Noted: 2020-09-22

## 2021-06-10 PROBLEM — Z79.01 LONG TERM (CURRENT) USE OF ANTICOAGULANTS: Status: ACTIVE | Noted: 2019-12-31

## 2021-06-10 PROBLEM — E66.01 CLASS 3 SEVERE OBESITY WITH SERIOUS COMORBIDITY IN ADULT (HCC): Status: ACTIVE | Noted: 2021-06-10

## 2021-06-10 LAB
ADENOVIRUS PCR: NOT DETECTED
ANION GAP SERPL CALCULATED.3IONS-SCNC: 10 MMOL/L (ref 9–17)
BORDETELLA PARAPERTUSSIS: NOT DETECTED
BORDETELLA PERTUSSIS PCR: NOT DETECTED
BUN BLDV-MCNC: 15 MG/DL (ref 6–20)
BUN/CREAT BLD: ABNORMAL (ref 9–20)
CALCIUM SERPL-MCNC: 9 MG/DL (ref 8.6–10.4)
CHLAMYDIA PNEUMONIAE BY PCR: NOT DETECTED
CHLORIDE BLD-SCNC: 104 MMOL/L (ref 98–107)
CO2: 23 MMOL/L (ref 20–31)
CORONAVIRUS 229E PCR: NOT DETECTED
CORONAVIRUS HKU1 PCR: NOT DETECTED
CORONAVIRUS NL63 PCR: NOT DETECTED
CORONAVIRUS OC43 PCR: NOT DETECTED
CREAT SERPL-MCNC: 0.79 MG/DL (ref 0.7–1.2)
EKG ATRIAL RATE: 90 BPM
EKG P AXIS: 71 DEGREES
EKG P-R INTERVAL: 176 MS
EKG Q-T INTERVAL: 358 MS
EKG QRS DURATION: 98 MS
EKG QTC CALCULATION (BAZETT): 437 MS
EKG R AXIS: -22 DEGREES
EKG T AXIS: 62 DEGREES
EKG VENTRICULAR RATE: 90 BPM
GFR AFRICAN AMERICAN: >60 ML/MIN
GFR NON-AFRICAN AMERICAN: >60 ML/MIN
GFR SERPL CREATININE-BSD FRML MDRD: ABNORMAL ML/MIN/{1.73_M2}
GFR SERPL CREATININE-BSD FRML MDRD: ABNORMAL ML/MIN/{1.73_M2}
GLUCOSE BLD-MCNC: 189 MG/DL (ref 75–110)
GLUCOSE BLD-MCNC: 208 MG/DL (ref 70–99)
GLUCOSE BLD-MCNC: 255 MG/DL (ref 75–110)
GLUCOSE BLD-MCNC: 282 MG/DL (ref 75–110)
HCT VFR BLD CALC: 43 % (ref 41–53)
HEMOGLOBIN: 13.6 G/DL (ref 13.5–17.5)
HUMAN METAPNEUMOVIRUS PCR: NOT DETECTED
INFLUENZA A BY PCR: NOT DETECTED
INFLUENZA A H1 (2009) PCR: NORMAL
INFLUENZA A H1 PCR: NORMAL
INFLUENZA A H3 PCR: NORMAL
INFLUENZA B BY PCR: NOT DETECTED
MCH RBC QN AUTO: 29.6 PG (ref 26–34)
MCHC RBC AUTO-ENTMCNC: 31.7 G/DL (ref 31–37)
MCV RBC AUTO: 93.3 FL (ref 80–100)
MYCOPLASMA PNEUMONIAE PCR: NOT DETECTED
NRBC AUTOMATED: ABNORMAL PER 100 WBC
PARAINFLUENZA 1 PCR: NOT DETECTED
PARAINFLUENZA 2 PCR: NOT DETECTED
PARAINFLUENZA 3 PCR: NOT DETECTED
PARAINFLUENZA 4 PCR: NOT DETECTED
PDW BLD-RTO: 16.6 % (ref 12.5–15.4)
PLATELET # BLD: 246 K/UL (ref 140–450)
PMV BLD AUTO: 9 FL (ref 6–12)
POTASSIUM SERPL-SCNC: 4.7 MMOL/L (ref 3.7–5.3)
PROCALCITONIN: 0.04 NG/ML
RBC # BLD: 4.6 M/UL (ref 4.5–5.9)
RESP SYNCYTIAL VIRUS PCR: NOT DETECTED
RHINO/ENTEROVIRUS PCR: NOT DETECTED
SARS-COV-2, PCR: NOT DETECTED
SODIUM BLD-SCNC: 137 MMOL/L (ref 135–144)
SPECIMEN DESCRIPTION: NORMAL
WBC # BLD: 10.6 K/UL (ref 3.5–11)

## 2021-06-10 PROCEDURE — 6370000000 HC RX 637 (ALT 250 FOR IP): Performed by: NURSE PRACTITIONER

## 2021-06-10 PROCEDURE — 97116 GAIT TRAINING THERAPY: CPT

## 2021-06-10 PROCEDURE — 1210000000 HC MED SURG R&B

## 2021-06-10 PROCEDURE — 80048 BASIC METABOLIC PNL TOTAL CA: CPT

## 2021-06-10 PROCEDURE — 94640 AIRWAY INHALATION TREATMENT: CPT

## 2021-06-10 PROCEDURE — 6360000002 HC RX W HCPCS: Performed by: NURSE PRACTITIONER

## 2021-06-10 PROCEDURE — 97166 OT EVAL MOD COMPLEX 45 MIN: CPT

## 2021-06-10 PROCEDURE — 99232 SBSQ HOSP IP/OBS MODERATE 35: CPT | Performed by: FAMILY MEDICINE

## 2021-06-10 PROCEDURE — 87205 SMEAR GRAM STAIN: CPT

## 2021-06-10 PROCEDURE — 85027 COMPLETE CBC AUTOMATED: CPT

## 2021-06-10 PROCEDURE — 6370000000 HC RX 637 (ALT 250 FOR IP): Performed by: FAMILY MEDICINE

## 2021-06-10 PROCEDURE — 84145 PROCALCITONIN (PCT): CPT

## 2021-06-10 PROCEDURE — 94660 CPAP INITIATION&MGMT: CPT

## 2021-06-10 PROCEDURE — APPSS45 APP SPLIT SHARED TIME 31-45 MINUTES: Performed by: NURSE PRACTITIONER

## 2021-06-10 PROCEDURE — 94761 N-INVAS EAR/PLS OXIMETRY MLT: CPT

## 2021-06-10 PROCEDURE — 2580000003 HC RX 258: Performed by: NURSE PRACTITIONER

## 2021-06-10 PROCEDURE — 36415 COLL VENOUS BLD VENIPUNCTURE: CPT

## 2021-06-10 PROCEDURE — 2700000000 HC OXYGEN THERAPY PER DAY

## 2021-06-10 PROCEDURE — 82947 ASSAY GLUCOSE BLOOD QUANT: CPT

## 2021-06-10 PROCEDURE — 0202U NFCT DS 22 TRGT SARS-COV-2: CPT

## 2021-06-10 PROCEDURE — 71045 X-RAY EXAM CHEST 1 VIEW: CPT

## 2021-06-10 PROCEDURE — 97162 PT EVAL MOD COMPLEX 30 MIN: CPT

## 2021-06-10 PROCEDURE — 87070 CULTURE OTHR SPECIMN AEROBIC: CPT

## 2021-06-10 RX ORDER — FUROSEMIDE 10 MG/ML
40 INJECTION INTRAMUSCULAR; INTRAVENOUS DAILY
Status: DISCONTINUED | OUTPATIENT
Start: 2021-06-10 | End: 2021-06-12 | Stop reason: HOSPADM

## 2021-06-10 RX ORDER — ATORVASTATIN CALCIUM 40 MG/1
40 TABLET, FILM COATED ORAL NIGHTLY
Status: DISCONTINUED | OUTPATIENT
Start: 2021-06-10 | End: 2021-06-12 | Stop reason: HOSPADM

## 2021-06-10 RX ORDER — DEXTROSE MONOHYDRATE 25 G/50ML
12.5 INJECTION, SOLUTION INTRAVENOUS PRN
Status: DISCONTINUED | OUTPATIENT
Start: 2021-06-10 | End: 2021-06-12 | Stop reason: HOSPADM

## 2021-06-10 RX ORDER — METHYLPREDNISOLONE SODIUM SUCCINATE 125 MG/2ML
80 INJECTION, POWDER, LYOPHILIZED, FOR SOLUTION INTRAMUSCULAR; INTRAVENOUS EVERY 8 HOURS
Status: COMPLETED | OUTPATIENT
Start: 2021-06-10 | End: 2021-06-11

## 2021-06-10 RX ORDER — PANTOPRAZOLE SODIUM 40 MG/1
40 TABLET, DELAYED RELEASE ORAL
Status: DISCONTINUED | OUTPATIENT
Start: 2021-06-10 | End: 2021-06-12 | Stop reason: HOSPADM

## 2021-06-10 RX ORDER — NICOTINE POLACRILEX 4 MG
15 LOZENGE BUCCAL PRN
Status: DISCONTINUED | OUTPATIENT
Start: 2021-06-10 | End: 2021-06-12 | Stop reason: HOSPADM

## 2021-06-10 RX ORDER — OXYCODONE HYDROCHLORIDE AND ACETAMINOPHEN 5; 325 MG/1; MG/1
1 TABLET ORAL EVERY 4 HOURS PRN
Status: DISCONTINUED | OUTPATIENT
Start: 2021-06-10 | End: 2021-06-12 | Stop reason: HOSPADM

## 2021-06-10 RX ORDER — DEXTROSE MONOHYDRATE 50 MG/ML
100 INJECTION, SOLUTION INTRAVENOUS PRN
Status: DISCONTINUED | OUTPATIENT
Start: 2021-06-10 | End: 2021-06-12 | Stop reason: HOSPADM

## 2021-06-10 RX ORDER — INSULIN GLARGINE 100 [IU]/ML
5 INJECTION, SOLUTION SUBCUTANEOUS NIGHTLY
Status: DISCONTINUED | OUTPATIENT
Start: 2021-06-10 | End: 2021-06-11

## 2021-06-10 RX ADMIN — OXYCODONE HYDROCHLORIDE AND ACETAMINOPHEN 1 TABLET: 5; 325 TABLET ORAL at 22:04

## 2021-06-10 RX ADMIN — SODIUM CHLORIDE, PRESERVATIVE FREE 10 ML: 5 INJECTION INTRAVENOUS at 07:58

## 2021-06-10 RX ADMIN — SODIUM CHLORIDE, PRESERVATIVE FREE 10 ML: 5 INJECTION INTRAVENOUS at 20:06

## 2021-06-10 RX ADMIN — BUDESONIDE AND FORMOTEROL FUMARATE DIHYDRATE 2 PUFF: 160; 4.5 AEROSOL RESPIRATORY (INHALATION) at 08:20

## 2021-06-10 RX ADMIN — OXYCODONE HYDROCHLORIDE AND ACETAMINOPHEN 2 TABLET: 5; 325 TABLET ORAL at 07:57

## 2021-06-10 RX ADMIN — PREGABALIN 50 MG: 25 CAPSULE ORAL at 12:24

## 2021-06-10 RX ADMIN — OXYCODONE HYDROCHLORIDE AND ACETAMINOPHEN 1 TABLET: 5; 325 TABLET ORAL at 17:02

## 2021-06-10 RX ADMIN — IPRATROPIUM BROMIDE AND ALBUTEROL SULFATE 1 AMPULE: .5; 2.5 SOLUTION RESPIRATORY (INHALATION) at 08:20

## 2021-06-10 RX ADMIN — INSULIN GLARGINE 5 UNITS: 100 INJECTION, SOLUTION SUBCUTANEOUS at 21:46

## 2021-06-10 RX ADMIN — IPRATROPIUM BROMIDE AND ALBUTEROL SULFATE 1 AMPULE: .5; 2.5 SOLUTION RESPIRATORY (INHALATION) at 16:16

## 2021-06-10 RX ADMIN — METHYLPREDNISOLONE SODIUM SUCCINATE 40 MG: 40 INJECTION, POWDER, FOR SOLUTION INTRAMUSCULAR; INTRAVENOUS at 01:39

## 2021-06-10 RX ADMIN — BUDESONIDE AND FORMOTEROL FUMARATE DIHYDRATE 2 PUFF: 160; 4.5 AEROSOL RESPIRATORY (INHALATION) at 20:33

## 2021-06-10 RX ADMIN — INSULIN LISPRO 1 UNITS: 100 INJECTION, SOLUTION INTRAVENOUS; SUBCUTANEOUS at 12:27

## 2021-06-10 RX ADMIN — PREGABALIN 50 MG: 25 CAPSULE ORAL at 20:06

## 2021-06-10 RX ADMIN — PANTOPRAZOLE SODIUM 40 MG: 40 TABLET, DELAYED RELEASE ORAL at 12:23

## 2021-06-10 RX ADMIN — OXYCODONE HYDROCHLORIDE AND ACETAMINOPHEN 2 TABLET: 5; 325 TABLET ORAL at 12:20

## 2021-06-10 RX ADMIN — FUROSEMIDE 40 MG: 20 TABLET ORAL at 07:57

## 2021-06-10 RX ADMIN — IPRATROPIUM BROMIDE AND ALBUTEROL SULFATE 1 AMPULE: .5; 2.5 SOLUTION RESPIRATORY (INHALATION) at 20:21

## 2021-06-10 RX ADMIN — APIXABAN 5 MG: 5 TABLET, FILM COATED ORAL at 07:57

## 2021-06-10 RX ADMIN — METHYLPREDNISOLONE SODIUM SUCCINATE 80 MG: 125 INJECTION, POWDER, FOR SOLUTION INTRAMUSCULAR; INTRAVENOUS at 12:25

## 2021-06-10 RX ADMIN — INSULIN LISPRO 9 UNITS: 100 INJECTION, SOLUTION INTRAVENOUS; SUBCUTANEOUS at 17:26

## 2021-06-10 RX ADMIN — IPRATROPIUM BROMIDE AND ALBUTEROL SULFATE 1 AMPULE: .5; 2.5 SOLUTION RESPIRATORY (INHALATION) at 11:24

## 2021-06-10 RX ADMIN — APIXABAN 5 MG: 5 TABLET, FILM COATED ORAL at 20:06

## 2021-06-10 RX ADMIN — AZITHROMYCIN 500 MG: 250 TABLET, FILM COATED ORAL at 07:57

## 2021-06-10 RX ADMIN — PREGABALIN 50 MG: 25 CAPSULE ORAL at 07:57

## 2021-06-10 RX ADMIN — METHYLPREDNISOLONE SODIUM SUCCINATE 80 MG: 125 INJECTION, POWDER, FOR SOLUTION INTRAMUSCULAR; INTRAVENOUS at 20:06

## 2021-06-10 RX ADMIN — ATORVASTATIN CALCIUM 40 MG: 40 TABLET, FILM COATED ORAL at 20:06

## 2021-06-10 RX ADMIN — METHYLPREDNISOLONE SODIUM SUCCINATE 40 MG: 40 INJECTION, POWDER, FOR SOLUTION INTRAMUSCULAR; INTRAVENOUS at 07:56

## 2021-06-10 RX ADMIN — HYDROXYZINE HYDROCHLORIDE 25 MG: 25 TABLET, FILM COATED ORAL at 17:32

## 2021-06-10 RX ADMIN — INSULIN LISPRO 2 UNITS: 100 INJECTION, SOLUTION INTRAVENOUS; SUBCUTANEOUS at 07:58

## 2021-06-10 RX ADMIN — INSULIN LISPRO 5 UNITS: 100 INJECTION, SOLUTION INTRAVENOUS; SUBCUTANEOUS at 21:46

## 2021-06-10 ASSESSMENT — PAIN DESCRIPTION - PAIN TYPE
TYPE: CHRONIC PAIN

## 2021-06-10 ASSESSMENT — ENCOUNTER SYMPTOMS
EYES NEGATIVE: 1
COLOR CHANGE: 1
GASTROINTESTINAL NEGATIVE: 1
WHEEZING: 0
CHEST TIGHTNESS: 0
SHORTNESS OF BREATH: 1
COUGH: 1
CHOKING: 0

## 2021-06-10 ASSESSMENT — PAIN DESCRIPTION - ORIENTATION
ORIENTATION: RIGHT;LEFT

## 2021-06-10 ASSESSMENT — PAIN SCALES - GENERAL
PAINLEVEL_OUTOF10: 10
PAINLEVEL_OUTOF10: 7
PAINLEVEL_OUTOF10: 10
PAINLEVEL_OUTOF10: 10
PAINLEVEL_OUTOF10: 7
PAINLEVEL_OUTOF10: 10
PAINLEVEL_OUTOF10: 5
PAINLEVEL_OUTOF10: 0
PAINLEVEL_OUTOF10: 4
PAINLEVEL_OUTOF10: 4
PAINLEVEL_OUTOF10: 0

## 2021-06-10 ASSESSMENT — PAIN DESCRIPTION - DESCRIPTORS
DESCRIPTORS: ACHING;DISCOMFORT
DESCRIPTORS: THROBBING
DESCRIPTORS: ACHING;DISCOMFORT;THROBBING
DESCRIPTORS: ACHING

## 2021-06-10 ASSESSMENT — PAIN DESCRIPTION - LOCATION
LOCATION: LEG

## 2021-06-10 ASSESSMENT — PAIN DESCRIPTION - PROGRESSION
CLINICAL_PROGRESSION: NOT CHANGED

## 2021-06-10 ASSESSMENT — PAIN DESCRIPTION - FREQUENCY
FREQUENCY: CONTINUOUS

## 2021-06-10 ASSESSMENT — PAIN - FUNCTIONAL ASSESSMENT
PAIN_FUNCTIONAL_ASSESSMENT: PREVENTS OR INTERFERES SOME ACTIVE ACTIVITIES AND ADLS

## 2021-06-10 ASSESSMENT — PAIN DESCRIPTION - DIRECTION: RADIATING_TOWARDS: L HIP

## 2021-06-10 ASSESSMENT — PAIN DESCRIPTION - ONSET
ONSET: ON-GOING

## 2021-06-10 NOTE — PROGRESS NOTES
Pt started on O2 at 2lpm for spo2 frequently dropping to 87-88% after cpap removed.   spo2 now 91-93 on 2lpm

## 2021-06-10 NOTE — H&P
[facility]  Office: [address], DO, Dusty Shi, DO, Emelina Franklin, DO, Elvira Coverrenetta Blood, DO, Shannan Vaca MD, Reji Cabrera MD, Dion Burkett MD, Manjinder Romo MD, Collette Denmark, MD, Florentin Silva MD, Rylan Haynes MD, Melecio England MD, Bj Geller, DO, Mireya Romero MD, Magdy Stafford DO, Peg Morales MD,  Zoe Valencia DO, Anne Marie Georges MD, Eliott Spatz, MD, Norberto Ellington MD, Kerry Mcallister MD, Magdaleno Kumar, [facility], [facility] Darell, CNP, Cricket Reilly, CNP, Betty Galeana, CNS, Osvaldo Alex, CNP, Leigh Marks, CNP, Nasrin Keating, CNP, Mervat Rodriguez, CNP, Avani Obregon, CNP, Crow Scherer PA-C, Gabrielle Garner, [facility], Duane Abed, CNP, Eduardo Saenz, CNP, Liat Perez, CNP, Eliane Hood, CNP, Griffin Toscano, CNP, Radha Maradiaga, [facility]   [address] [facility]    HISTORY AND PHYSICAL EXAMINATION            Date:   6/10/2021  Patient name:  Rayna Horan  Date of admission:  6/9/2021  3:53 PM  MRN:   [id number]  Account:  [de-identified]  YOB: 1965  PCP:    DANIEL Garibay CNP  Room:   [address] [place] [address]  Code Status:    Full Code    Chief Complaint:     Chief Complaint   Patient presents with    Shortness of Breath       History Obtained From:     patient    History of Present Illness:     Rayna Horan is a 49 y.o. Non-/non  male who presents with Shortness of Breath   and is admitted to the hospital for the management of COPD (chronic obstructive pulmonary disease) ([facility] Patient reports that yesterday around 5 AM he woke up gasping for air despite wearing his CPAP. He says he took 2 breathing treatments and wear his CPAP for the remainder of the day with no improvement. He lives at home with his sister who called EMS for him. He says that he has noticed increased swelling in his legs that started about 2 weeks ago.   He also says that he has had intermittent nonproductive cough and he sleeps propped up. He is a former smoker but quit smoking in January 2021. He denies chest pain, fever, chills with this shortness of breath episode. He also denies numbness or tingling. His last hospitalization for COPD exacerbation was last month. Did not qualify for home O2 at that time. He also cannot recall if he followed up with pulmonary upon discharge from the hospital.    While in ED chest x-ray revealed no acute process. Twelve-lead EKG revealed sinus rhythm with a heart rate of 90 and no ST, T wave or Q wave abnormalities. He received a DuoNeb treatment and IV Solu-Medrol. He was admitted to the inpatient nursing unit for further management of acute COPD exacerbation. Past Medical History:     Past Medical History:   Diagnosis Date    COPD (chronic obstructive pulmonary disease) ([facility]     Diabetes mellitus ([facility]         Past Surgical History:     History reviewed. No pertinent surgical history. Medications Prior to Admission:     Prior to Admission medications    Medication Sig Start Date End Date Taking? Authorizing Provider   albuterol (PROVENTIL) (2.5 MG/3ML) 0.083% nebulizer solution USE 1 VIAL IN NEBULIZER EVERY 6 HOURS AS NEEDED FOR WHEEZING FOR SHORTNESS OF BREATH 2/22/21   Historical Provider, MD   apixaban (ELIQUIS) 5 MG TABS tablet Take 1 tablet by mouth twice daily 3/29/21   Historical Provider, MD   budesonide-formoterol (SYMBICORT) 160-4.5 MCG/ACT AERO Inhale 2 puffs into the lungs 2 times daily 10/8/20   Historical Provider, MD   furosemide (LASIX) 40 MG tablet Take 1 tablet by mouth once daily 2/28/21   Historical Provider, MD   hydrOXYzine (VISTARIL) 25 MG capsule TAKE 1 CAPSULE BY MOUTH EVERY 8 HOURS AS NEEDED FOR ANXIETY 5/6/21   Historical Provider, MD   pregabalin (LYRICA) 50 MG capsule Take 50 mg by mouth 3 times daily.  4/27/21   Historical Provider, MD   traMADol (ULTRAM) 50 MG tablet TAKE 1 TABLET BY MOUTH ONCE DAILY AS NEEDED FOR PAIN 4/22/21 Historical Provider, MD   zolpidem (AMBIEN) 10 MG tablet Take 10 mg by mouth nightly as needed. 21   Historical Provider, MD   hydrOXYzine (ATARAX) 25 MG tablet Take 25 mg by mouth every 8 hours as needed for Itching    Historical Provider, MD        Allergies:     Patient has no known allergies. Social History:     Tobacco:    reports that he has quit smoking. His smoking use included cigarettes. He started smoking about 5 months ago. He has a 25.00 pack-year smoking history. He has never used smokeless tobacco.  Alcohol:      reports previous alcohol use. Drug Use:  reports previous drug use. Family History:     Family History   Problem Relation Age of Onset    Lung Cancer Mother        Review of Systems:     Positive and Negative as described in HPI. Review of Systems   Constitutional: Negative. HENT: Negative. Eyes: Negative. Respiratory: Positive for cough and shortness of breath. Negative for choking, chest tightness and wheezing. Cardiovascular: Positive for leg swelling. Negative for chest pain and palpitations. Gastrointestinal: Negative. Genitourinary: Negative. Musculoskeletal: Negative. Skin: Positive for color change. Negative for rash and wound. Chronic discoloration to bilateral lower extremities   Neurological: Negative. Psychiatric/Behavioral: Negative. Physical Exam:   /61   Pulse 77   Temp 97.7 °F (36.5 °C) (Oral)   Resp 18   Ht 6' 3\" (1.905 m)   Wt (!) 348 lb 5.2 oz (158 kg)   SpO2 92%   BMI 43.54 kg/m²   Temp (24hrs), Av.1 °F (36.7 °C), Min:97.7 °F (36.5 °C), Max:98.6 °F (37 °C)    Recent Labs     06/09/21  1959 06/10/21  1218   POCGLU 202* 189*       Intake/Output Summary (Last 24 hours) at 6/10/2021 1329  Last data filed at 6/10/2021 1222  Gross per 24 hour   Intake 1440 ml   Output 2275 ml   Net -835 ml       Physical Exam  Vitals and nursing note reviewed. Constitutional:       General: He is in acute distress. Appearance: He is ill-appearing. He is not toxic-appearing or diaphoretic. Comments: Mild respiratory distress   HENT:      Head: Normocephalic and atraumatic. Right Ear: External ear normal.      Left Ear: External ear normal.      Nose: Nose normal. No rhinorrhea. Mouth/Throat:      Mouth: Mucous membranes are moist.   Eyes:      General: No scleral icterus. Right eye: No discharge. Left eye: No discharge. Extraocular Movements: Extraocular movements intact. Conjunctiva/sclera: Conjunctivae normal.      Pupils: Pupils are equal, round, and reactive to light. Neck:      Comments: No JVD  Cardiovascular:      Rate and Rhythm: Normal rate and regular rhythm. Pulses: Normal pulses. Heart sounds: Normal heart sounds. No murmur heard. No friction rub. No gallop. Pulmonary:      Effort: Respiratory distress present. No accessory muscle usage. Breath sounds: Decreased air movement present. No stridor or transmitted upper airway sounds. Decreased breath sounds present. No wheezing, rhonchi or rales. Comments: Supplemental O2 in place. Patient visibly more short of breath after leaning forward for assessment. Abdominal:      General: There is distension. Palpations: Abdomen is soft. Tenderness: There is no abdominal tenderness. There is no guarding. Hernia: No hernia is present. Comments: Bowel sounds hypoactive   Musculoskeletal:         General: Normal range of motion. Cervical back: Normal range of motion and neck supple. Right lower leg: Edema present. Left lower leg: Edema present. Comments: 2+ pitting edema bilateral lower extremities   Skin:     General: Skin is warm and dry. Coloration: Skin is not jaundiced. Findings: No bruising, lesion or rash. Nails: There is no clubbing.       Comments: Chronic vascular discoloration to bilateral lower extremities   Neurological:      General: No focal deficit present. Mental Status: He is alert and oriented to person, place, and time. Motor: No weakness. Psychiatric:         Attention and Perception: Attention normal.         Mood and Affect: Mood is anxious. Affect is labile. Speech: Speech normal.         Behavior: Behavior is agitated. Thought Content: Thought content normal.         Cognition and Memory: Cognition normal. He exhibits impaired recent memory.          Judgment: Judgment normal.      Comments: Patient unable to recall if he has seen the pulmonologist after last hospital stay         Investigations:      Laboratory Testing:  Recent Results (from the past 24 hour(s))   Basic Metabolic Panel    Collection Time: 06/09/21  4:38 PM   Result Value Ref Range    Glucose 158 (H) 70 - 99 mg/dL    BUN 16 6 - 20 mg/dL    CREATININE 0.82 0.70 - 1.20 mg/dL    Bun/Cre Ratio NOT REPORTED 9 - 20    Calcium 9.1 8.6 - 10.4 mg/dL    Sodium 141 135 - 144 mmol/L    Potassium 4.5 3.7 - 5.3 mmol/L    Chloride 109 (H) 98 - 107 mmol/L    CO2 26 20 - 31 mmol/L    Anion Gap 6 (L) 9 - 17 mmol/L    GFR Non-African American >60 >60 mL/min    GFR African American >60 >60 mL/min    GFR Comment          GFR Staging NOT REPORTED    CBC Auto Differential    Collection Time: 06/09/21  4:38 PM   Result Value Ref Range    WBC 6.6 3.5 - 11.0 k/uL    RBC 4.52 4.5 - 5.9 m/uL    Hemoglobin 13.4 (L) 13.5 - 17.5 g/dL    Hematocrit 41.8 41 - 53 %    MCV 92.4 80 - 100 fL    MCH 29.7 26 - 34 pg    MCHC 32.2 31 - 37 g/dL    RDW 17.0 (H) 12.5 - 15.4 %    Platelets 857 868 - 947 k/uL    MPV 8.7 6.0 - 12.0 fL    NRBC Automated NOT REPORTED per 100 WBC    Differential Type NOT REPORTED     Seg Neutrophils 62 36 - 66 %    Lymphocytes 23 (L) 24 - 44 %    Monocytes 8 2 - 11 %    Eosinophils % 5 (H) 1 - 4 %    Basophils 2 0 - 2 %    Immature Granulocytes NOT REPORTED 0 %    Segs Absolute 4.10 1.8 - 7.7 k/uL    Absolute Lymph # 1.50 1.0 - 4.8 k/uL    Absolute Mono # 0.50 0.1 - 1.2 k/uL    Absolute Eos # 0.30 0.0 - 0.4 k/uL    Basophils Absolute 0.10 0.0 - 0.2 k/uL    Absolute Immature Granulocyte NOT REPORTED 0.00 - 0.30 k/uL    WBC Morphology NOT REPORTED     RBC Morphology NOT REPORTED     Platelet Estimate NOT REPORTED    Troponin    Collection Time: 06/09/21  4:38 PM   Result Value Ref Range    Troponin, High Sensitivity 16 0 - 22 ng/L    Troponin T NOT REPORTED <0.03 ng/mL    Troponin Interp NOT REPORTED    Brain Natriuretic Peptide    Collection Time: 06/09/21  4:38 PM   Result Value Ref Range    Pro-BNP 99 <300 pg/mL    BNP Interpretation Pro-BNP Reference Range:    EKG 12 Lead    Collection Time: 06/09/21  4:45 PM   Result Value Ref Range    Ventricular Rate 90 BPM    Atrial Rate 90 BPM    P-R Interval 176 ms    QRS Duration 98 ms    Q-T Interval 358 ms    QTc Calculation (Bazett) 437 ms    P Axis 71 degrees    R Axis -22 degrees    T Axis 62 degrees   COVID-19, Rapid    Collection Time: 06/09/21  5:10 PM    Specimen: Nasopharyngeal Swab   Result Value Ref Range    Specimen Description . NASOPHARYNGEAL SWAB     SARS-CoV-2, Rapid Not Detected Not Detected   POC Glucose Fingerstick    Collection Time: 06/09/21  7:59 PM   Result Value Ref Range    POC Glucose 202 (H) 75 - 110 mg/dL   Basic Metabolic Panel w/ Reflex to MG    Collection Time: 06/10/21  6:18 AM   Result Value Ref Range    Glucose 208 (H) 70 - 99 mg/dL    BUN 15 6 - 20 mg/dL    CREATININE 0.79 0.70 - 1.20 mg/dL    Bun/Cre Ratio NOT REPORTED 9 - 20    Calcium 9.0 8.6 - 10.4 mg/dL    Sodium 137 135 - 144 mmol/L    Potassium 4.7 3.7 - 5.3 mmol/L    Chloride 104 98 - 107 mmol/L    CO2 23 20 - 31 mmol/L    Anion Gap 10 9 - 17 mmol/L    GFR Non-African American >60 >60 mL/min    GFR African American >60 >60 mL/min    GFR Comment          GFR Staging NOT REPORTED    CBC    Collection Time: 06/10/21  6:18 AM   Result Value Ref Range    WBC 10.6 3.5 - 11.0 k/uL    RBC 4.60 4.5 - 5.9 m/uL    Hemoglobin 13.6 13.5 - 17.5 g/dL Hematocrit 43.0 41 - 53 %    MCV 93.3 80 - 100 fL    MCH 29.6 26 - 34 pg    MCHC 31.7 31 - 37 g/dL    RDW 16.6 (H) 12.5 - 15.4 %    Platelets 487 649 - 431 k/uL    MPV 9.0 6.0 - 12.0 fL    NRBC Automated NOT REPORTED per 100 WBC   POC Glucose Fingerstick    Collection Time: 06/10/21 12:18 PM   Result Value Ref Range    POC Glucose 189 (H) 75 - 110 mg/dL       Imaging/Diagnostics:    XR CHEST PORTABLE    Result Date: 6/10/2021  Bibasilar patchy infiltrates with obscuration of left hemidiaphragm. May represent atelectasis or consolidation and small effusion. XR CHEST PORTABLE    Result Date: 6/9/2021  Stable chest without acute process. 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) 6/10/2021 Yes          Plan:     Patient status inpatient in the Med/Surge    1. COPD exacerbation: Continue Symbicort. Increase IV Solu-Medrol from 40 mg every 6 hours to 80 mg every 8 hours. Check procalcitonin. Check respiratory panel. Patient was swabbed for Covid and was negative prior to admission. Send sputum for culture nebulizer treatments as ordered. P.o. Zithromax as ordered. 2. Diabetes type 2: Carb controlled diet. Monitor blood glucose before meals and at bedtime. Low correction sliding scale insulin. 3. Sleep apnea: BiPAP at bedtime and with naps. 4. History DVT/long-term use of anticoagulants: Continue patient's home Eliquis 5 mg twice daily. 5. GERD: GI prophylaxis/Protonix 40 mg p.o. daily  6.  Obesity: Weight loss management as outpatient per PCP. Consultations:   None     Patient is admitted as inpatient status because of co-morbidities listed above, severity of signs and symptoms as outlined, requirement for current medical therapies and most importantly because of direct risk to patient if care not provided in a hospital setting. Expected length of stay > 48 hours.     DANIEL Garcia - SHANTELL  6/10/2021  1:29 PM    Copy sent to Dr. Lizz Arrieta APRN - CNP

## 2021-06-10 NOTE — PROGRESS NOTES
Elmer Fuchs NP for patient complaining of bilat leg pain from +4 edema and requesting home dose of percocet.     Idalia Santillan NP ordered percocet and IV lasix

## 2021-06-10 NOTE — PROGRESS NOTES
Incentive spirometry education given as ordered. Demonstrated use of device and encouraged patient to use while awake. Patient verbalizes understanding of education. Will continue to monitor.

## 2021-06-10 NOTE — PROGRESS NOTES
Pt uses cpap unit at home but does not have his unit with him. Hospital cpap initiated. Pt unsure of home settings so settings adjusted to patient comfort. Settings as documented in flowsheet.

## 2021-06-10 NOTE — CARE COORDINATION
[facility] ICU Quality Flow/Interdisciplinary Rounds Progress Note    Quality Flow Rounds held on Carmen 10, 2021 at [address] Attending:  , Dietary, Respiratory Therapy, Physical Therapy, Occupational Therapy and Spiritual Care/    Anticipated Discharge Date:  Expected Discharge Date: 06/12/21    Anticipated Discharge Disposition:    Readmission Risk              Risk of Unplanned Readmission:  13           Discussed patient goal for the day, patient clinical progression, and barriers to discharge.   The following Goal(s) of the Day/Commitment(s) have been identified:  respiratory support - supplemental O2, activity progression      Serafin Olguin RN  Carmen 10, 2021

## 2021-06-10 NOTE — PROGRESS NOTES
Physical Therapy    Facility/Department: [facility] MED SURG ICU  Initial Assessment    NAME: Janet Renteria  : 1965  MRN: [id number]    Date of Service: 6/10/2021  Chief Complaint   Patient presents with    Shortness of Breath       Discharge Recommendations:  Patient would benefit from continued therapy after discharge   PT Equipment Recommendations  Equipment Needed: No  Other: Pt owns a rollator- to benefit from use of assistive device    Assessment   Body structures, Functions, Activity limitations: Decreased functional mobility ; Decreased strength;Decreased endurance;Decreased balance  Assessment: Pt with noted impaired endurance and then increase in mobility deficits as his fatigues more. Pt to benefit from use of rollator at home and additional therapy to address above noted impairments. Pt will benefit from family support as well. Prognosis: Good  Decision Making: Medium Complexity  PT Education: Goals;PT Role;Plan of Care;Transfer Training;Functional Mobility Training;Gait Training  REQUIRES PT FOLLOW UP: Yes  Activity Tolerance  Activity Tolerance: Patient limited by fatigue;Patient limited by endurance       Patient Diagnosis(es): The encounter diagnosis was COPD exacerbation ([facility] has a past medical history of COPD (chronic obstructive pulmonary disease) ([facility] and Diabetes mellitus ([facility] has no past surgical history on file.     Restrictions  Restrictions/Precautions  Restrictions/Precautions: General Precautions, Fall Risk  Required Braces or Orthoses?: No  Position Activity Restriction  Other position/activity restrictions: Up as tolerated - 2L O2 via nasal cannula- ambulated on room air at request of nursing while monitored  Vision/Hearing  Vision: Within Functional Limits (pt reports need for glasses)  Hearing: Within functional limits     Subjective  General  Patient assessed for rehabilitation services?: Yes  Response To Previous Treatment: Not applicable  Family / Caregiver Co evaluation with OT-see OT evaluation for full UE assessment  Strength LUE  Comment: Co evaluation with OT-see OT evaluation for full UE assessment     Sensation  Overall Sensation Status: WFL (pt denies any numbness or tingling at this time.)  Bed mobility  Scooting: Stand by assistance  Comment: Scooting assessed in chair as pt was up to chair and retired to chair  Transfers  Sit to Stand: Contact guard assistance  Stand to sit: Contact guard assistance  Ambulation  Ambulation?: Yes  More Ambulation?: Yes  Ambulation 1  Surface: level tile  Device: No Device  Assistance: Contact guard assistance  Quality of Gait: unsteady, forward flexed, forward momentum causing some unsteadiness  Gait Deviations: Slow Ashley;Decreased step length; Increased PHILIP  Distance: 15ft  Comments: Room air- oxygen saturation decreases to 86% but increases with cues for breathing techniques  Ambulation 2  Surface - 2: level tile  Device 2: Rolling Walker  Assistance 2: Stand by assistance;Contact guard assistance  Quality of Gait 2: unsteady, forward flexed, forward momentum causing some unsteadiness but improved from no device  Gait Deviations: Slow Ashley; Increased PHILIP; Decreased step length  Distance: 25ft, 35ft, 75ft  Comments: Oxygen saturation on room air decreases to 86% during gait but improves to 92-93% with cues for breathing and standing rest break.   Stairs/Curb  Stairs?: No     Balance  Posture: Fair  Sitting - Static: Good;-  Sitting - Dynamic: Fair;+  Standing - Static: Fair  Standing - Dynamic: Fair  Comments: standing balance assessed without device        Plan   Plan  Times per week: 5-6x  Times per day: Daily  Current Treatment Recommendations: Strengthening, Transfer Training, Balance Training, Functional Mobility Training, Gait Training, Stair training, Endurance Training, Patient/Caregiver Education & Training, Home Exercise Program, Safety Education & Training  Safety Devices  Type of devices: Call light within reach, Gait belt, Nurse notified, Left in chair  Restraints  Initially in place: No      AM-PAC Score  AM-PAC Inpatient Mobility Raw Score : 16 (06/10/21 1654)  AM-PAC Inpatient T-Scale Score : 40.78 (06/10/21 1654)  Mobility Inpatient CMS 0-100% Score: 54.16 (06/10/21 1654)  Mobility Inpatient CMS G-Code Modifier : CK (06/10/21 1654)          Goals  Short term goals  Time Frame for Short term goals: 14 visits  Short term goal 1: Pt to ambulate 300ft modified independently with least restrictive device to allow for return to prior functional level  Short term goal 2: Pt to sit <> stand transfer independently to allow for return to prior functional level  Short term goal 3: Pt to ascend/descend two steps without rails independently to allow for safe access to home  Short term goal 4: Pt to tolerate 40 minutes of therapy for improved endurance  Short term goal 5: Pt to demonstrate good - standing balance to decrease risk of falls       Therapy Time   Individual Concurrent Group Co-treatment   Time In 1531         Time Out 1607         Minutes 36         Timed Code Treatment Minutes: [address] [facility], PT

## 2021-06-10 NOTE — PROGRESS NOTES
Occupational Therapy   Occupational Therapy Initial Assessment  Date: 6/10/2021   Patient Name: Annabel Soares  MRN: [id number]     : 1965    Date of Service: 6/10/2021  Chief Complaint   Patient presents with    Shortness of Breath       Discharge Recommendations:  Continue to assess pending progress     OT Equipment Recommendations  Equipment Needed: No    Assessment   Performance deficits / Impairments: Decreased functional mobility ; Decreased safe awareness;Decreased balance;Decreased ADL status; Decreased high-level IADLs;Decreased endurance  Assessment: pt limited by COPD exacerbation at this time - pt requires increased verbal cueing for pursed lip breathing for O2 recovery throughout. Pt would continue to benefit from skilled OT services during hospitalization to promote pt's safety, activity tolerance, and independence to participate in ADLs and functional mobility/transfers. pt expectant for safe return to prior living with support from family members as needed. Prognosis: Good  Decision Making: Medium Complexity  OT Education: OT Role;Plan of Care;Energy Conservation;Equipment;Transfer Training  REQUIRES OT FOLLOW UP: Yes  Activity Tolerance  Activity Tolerance: Patient Tolerated treatment well;Patient limited by fatigue  Activity Tolerance: increased standing rest breaks required with RW during functional mobility  Safety Devices  Safety Devices in place: Yes  Type of devices: Call light within reach; Left in chair;Nurse notified  Restraints  Initially in place: No           Patient Diagnosis(es): The encounter diagnosis was COPD exacerbation ([facility] [address] has a past medical history of COPD (chronic obstructive pulmonary disease) ([facility] [address] [facility] and Diabetes mellitus ([facility] [address] [facility] has no past surgical history on file.            Restrictions  Restrictions/Precautions  Restrictions/Precautions: General Precautions, Fall Risk  Required Braces or Orthoses?: No  Position Activity Restriction  Other position/activity restrictions: Up as tolerated - 2L O2 via nasal cannula- ambulated on room air at request of nursing while monitored    Subjective   General  Patient assessed for rehabilitation services?: Yes  Family / Caregiver Present: No  General Comment  Comments: RN ok'd for rohit this PM.  Patient Currently in Pain: Yes  Pain Assessment  Pain Assessment: 0-10  Pain Level: 10  Pain Type: Chronic pain  Pain Location: Leg  Pain Orientation: Right;Left  Pain Descriptors: Aching;Discomfort; Throbbing  Pain Frequency: Continuous  Pain Onset: On-going  Clinical Progression: Not changed  Functional Pain Assessment: Prevents or interferes some active activities and ADLs  Non-Pharmaceutical Pain Intervention(s): Ambulation/Increased Activity; Elevation;Repositioned  Response to Pain Intervention: Patient Satisfied    Social/Functional History  Social/Functional History  Lives With: Other (comment) (sister)  Type of Home: House  Home Layout: One level  Home Access: Stairs to enter without rails  Entrance Stairs - Number of Steps: 2  Bathroom Shower/Tub: Tub/Shower unit  Bathroom Toilet: Standard  Bathroom Equipment: Grab bars in shower, Shower chair  Home Equipment: 4 wheeled walker  Receives Help From: Family (sister and niece)  ADL Assistance: Independent  Homemaking Responsibilities: No (Pt reports sister performs all homemaking responsibilities.)  Ambulation Assistance: Independent (w/o device)  Transfer Assistance: Independent  Active : No  Patient's  Info: Sister performs all driving  Mode of Transportation: SUV  Occupation: On disability  Type of occupation: Worked on [employer] doors  Leisure & Hobbies: Working on cars - fishing - watching TV  Additional Comments: pt reports sister in good health to assist as needed, reports she works full-time out of the home.        Objective   Vision: Within Functional Limits (pt reports need for glasses)  Hearing: Within functional limits    Orientation  Overall Orientation Status: Within Functional Limits     Balance  Sitting Balance: Supervision (seated with distal BUE propping on chair arms during functional screens)  Standing Balance: Contact guard assistance (close SBA-CGA with RW during functional mobility)    Functional Mobility  Functional - Mobility Device: Rolling Walker  Activity:  (from room > down the dang > return to chair)  Assist Level: Contact guard assistance  Functional Mobility Comments: close SBA-CGA with RW down dang from room; 3x standing rest breaks required secondary to reduced activity tolerance - VCs provided for pursed lip breathing for O2 recovery - SpO2 via finger throughout (94% at rest; 87% with functional mobility; 92% upon return to chair)    Toilet Transfers  Toilet Transfers Comments: Use of urinal at this time seated in recliner. ADL  Feeding: Modified independent   Grooming: Contact guard assistance; Increased time to complete  UE Bathing: Increased time to complete;Minimal assistance  LE Bathing: Increased time to complete;Minimal assistance  UE Dressing: Contact guard assistance (in standing to don gown)  LE Dressing: Increased time to complete;Minimal assistance  Toileting: Supervision (seated in recliner with use of urinal)     Tone RUE  RUE Tone: Normotonic  Tone LUE  LUE Tone: Normotonic  Coordination  Movements Are Fluid And Coordinated: Yes     Bed mobility  Comment: Pt up to chair at start and end of session. Transfers  Sit to stand: Stand by assistance  Stand to sit: Stand by assistance  Transfer Comments: increased time and effort to perform     Cognition  Overall Cognitive Status: Exceptions  Arousal/Alertness: Appropriate responses to stimuli  Following Commands:  Follows multistep commands consistently  Attention Span: Appears intact  Memory: Appears intact  Safety Judgement: Decreased awareness of need for safety  Problem Solving: Assistance required to identify errors made;Assistance required to correct errors made  Insights: Fully aware of deficits  Initiation: Does not require cues  Sequencing: Does not require cues        Sensation  Overall Sensation Status: WFL (pt denies any numbness or tingling at this time.)        LUE AROM (degrees)  LUE AROM : WFL  RUE AROM (degrees)  RUE AROM : WFL     LUE Strength  Gross LUE Strength: WFL  LUE Strength Comment: grossly 5/5  RUE Strength  Gross RUE Strength: WFL  RUE Strength Comment: grossly 5/5                   Plan   Plan  Times per week: 5-6x/week  Times per day: Daily  Current Treatment Recommendations: Safety Education & Training, Patient/Caregiver Education & Training, Self-Care / ADL, Balance Training, Functional Mobility Training, Equipment Evaluation, Education, & procurement, Endurance Training    AM-PAC Score  AM-PAC Inpatient Daily Activity Raw Score: 19 (06/10/21 1715)  AM-PAC Inpatient ADL T-Scale Score : 40.22 (06/10/21 1715)  ADL Inpatient CMS 0-100% Score: 42.8 (06/10/21 1715)  ADL Inpatient CMS G-Code Modifier : CK (06/10/21 1715)    Goals  Short term goals  Time Frame for Short term goals: 10 visits  Short term goal 1: Pt will demo Mod IND, with DME as needed, to perform ADLs. Short term goal 2: Pt will tolerate 10+ minutes of activity tolerance during functional tasks for increased participation in ADLs. Short term goal 3: Pt will demo Mod IND, with least restrictive AD, to perform functional mobility/transfers during ADLs. Short term goal 4: Pt will IND demo good safety awareness during functional tasks for increased engagement during functional transfers/mobility and ADLs.        Therapy Time   Individual Concurrent Group Co-treatment   Time In 1531         Time Out 1607         Minutes [id number], OTR/L

## 2021-06-10 NOTE — CARE COORDINATION
Case Management Initial Discharge Plan  Sherie Blanton,             Met with:patient to discuss discharge plans. Information verified: address, contacts, phone number, , insurance Yes    Emergency Contact/Next of Kin name & number: Reba Barrientos [phone]    PCP: DANIEL Jimenez CNP  Date of last visit: [address] Provider: [place] Medicaid    Discharge Planning    Living Arrangements:  Family Members   Support Systems:  Family Members    Home has 1 stories  2 stairs to climb to get into front door  Location of bedroom/bathroom in home 1    Patient able to perform ADL's:Independent    Current Services (outpatient & in home) none  DME equipment: walker - 4 wheels, cane, Cpap  DME provider: [facility]    Receiving oral anticoagulation therapy? Yes    If indicated:   Physician managing anticoagulation treatment: PCP  Where does patient obtain lab work for ATC treatment? n/a      Potential Assistance Needed:  1 Constance [place], Outpatient PT/OT    Patient agreeable to home care: no  [place] of choice provided:  no    Prior SNF/Rehab Placement and Facility: no  Agreeable to SNF/Rehab: no  [place] of choice provided: n/a     Evaluation: no    Expected Discharge date:  21    Patient expects to be discharged to:  home  Follow Up Appointment: Best Day/ Time:      Transportation provider: Ronnie savage  Transportation arrangements needed for discharge: No    Readmission Risk              Risk of Unplanned Readmission:  13             Does patient have a readmission risk score greater than 14?: No  If yes, follow-up appointment must be made within 7 days of discharge.      Goals of Care: improved breathing      Discharge Plan: home, lives with sister          Electronically signed by Dorenda Severs, RN on 6/10/21 at 8:17 AM EDT

## 2021-06-11 LAB
CULTURE: NORMAL
DIRECT EXAM: NORMAL
GLUCOSE BLD-MCNC: 169 MG/DL (ref 75–110)
GLUCOSE BLD-MCNC: 220 MG/DL (ref 75–110)
GLUCOSE BLD-MCNC: 228 MG/DL (ref 75–110)
GLUCOSE BLD-MCNC: 272 MG/DL (ref 75–110)
Lab: NORMAL
SPECIMEN DESCRIPTION: NORMAL

## 2021-06-11 PROCEDURE — 99232 SBSQ HOSP IP/OBS MODERATE 35: CPT | Performed by: FAMILY MEDICINE

## 2021-06-11 PROCEDURE — 6360000002 HC RX W HCPCS: Performed by: FAMILY MEDICINE

## 2021-06-11 PROCEDURE — 2580000003 HC RX 258: Performed by: NURSE PRACTITIONER

## 2021-06-11 PROCEDURE — 1210000000 HC MED SURG R&B

## 2021-06-11 PROCEDURE — APPSS30 APP SPLIT SHARED TIME 16-30 MINUTES: Performed by: NURSE PRACTITIONER

## 2021-06-11 PROCEDURE — 2700000000 HC OXYGEN THERAPY PER DAY

## 2021-06-11 PROCEDURE — 94640 AIRWAY INHALATION TREATMENT: CPT

## 2021-06-11 PROCEDURE — 82947 ASSAY GLUCOSE BLOOD QUANT: CPT

## 2021-06-11 PROCEDURE — 6370000000 HC RX 637 (ALT 250 FOR IP): Performed by: FAMILY MEDICINE

## 2021-06-11 PROCEDURE — 6360000002 HC RX W HCPCS: Performed by: NURSE PRACTITIONER

## 2021-06-11 PROCEDURE — 6370000000 HC RX 637 (ALT 250 FOR IP): Performed by: NURSE PRACTITIONER

## 2021-06-11 PROCEDURE — 94761 N-INVAS EAR/PLS OXIMETRY MLT: CPT

## 2021-06-11 PROCEDURE — 94660 CPAP INITIATION&MGMT: CPT

## 2021-06-11 RX ORDER — METHYLPREDNISOLONE SODIUM SUCCINATE 40 MG/ML
40 INJECTION, POWDER, LYOPHILIZED, FOR SOLUTION INTRAMUSCULAR; INTRAVENOUS DAILY
Status: DISCONTINUED | OUTPATIENT
Start: 2021-06-11 | End: 2021-06-11

## 2021-06-11 RX ORDER — PREDNISONE 20 MG/1
40 TABLET ORAL DAILY
Status: DISCONTINUED | OUTPATIENT
Start: 2021-06-12 | End: 2021-06-12 | Stop reason: HOSPADM

## 2021-06-11 RX ORDER — DOCUSATE SODIUM 100 MG/1
100 CAPSULE, LIQUID FILLED ORAL 3 TIMES DAILY
Status: DISCONTINUED | OUTPATIENT
Start: 2021-06-11 | End: 2021-06-12 | Stop reason: HOSPADM

## 2021-06-11 RX ORDER — METHYLPREDNISOLONE SODIUM SUCCINATE 40 MG/ML
40 INJECTION, POWDER, LYOPHILIZED, FOR SOLUTION INTRAMUSCULAR; INTRAVENOUS EVERY 8 HOURS
Status: DISCONTINUED | OUTPATIENT
Start: 2021-06-11 | End: 2021-06-11

## 2021-06-11 RX ORDER — INSULIN GLARGINE 100 [IU]/ML
10 INJECTION, SOLUTION SUBCUTANEOUS NIGHTLY
Status: DISCONTINUED | OUTPATIENT
Start: 2021-06-11 | End: 2021-06-12 | Stop reason: HOSPADM

## 2021-06-11 RX ADMIN — IPRATROPIUM BROMIDE AND ALBUTEROL SULFATE 1 AMPULE: .5; 2.5 SOLUTION RESPIRATORY (INHALATION) at 11:34

## 2021-06-11 RX ADMIN — SODIUM CHLORIDE, PRESERVATIVE FREE 10 ML: 5 INJECTION INTRAVENOUS at 08:50

## 2021-06-11 RX ADMIN — INSULIN LISPRO 3 UNITS: 100 INJECTION, SOLUTION INTRAVENOUS; SUBCUTANEOUS at 09:00

## 2021-06-11 RX ADMIN — IPRATROPIUM BROMIDE AND ALBUTEROL SULFATE 1 AMPULE: .5; 2.5 SOLUTION RESPIRATORY (INHALATION) at 07:44

## 2021-06-11 RX ADMIN — INSULIN LISPRO 6 UNITS: 100 INJECTION, SOLUTION INTRAVENOUS; SUBCUTANEOUS at 13:43

## 2021-06-11 RX ADMIN — SODIUM CHLORIDE, PRESERVATIVE FREE 10 ML: 5 INJECTION INTRAVENOUS at 21:00

## 2021-06-11 RX ADMIN — PANTOPRAZOLE SODIUM 40 MG: 40 TABLET, DELAYED RELEASE ORAL at 07:48

## 2021-06-11 RX ADMIN — INSULIN LISPRO 3 UNITS: 100 INJECTION, SOLUTION INTRAVENOUS; SUBCUTANEOUS at 22:39

## 2021-06-11 RX ADMIN — OXYCODONE HYDROCHLORIDE AND ACETAMINOPHEN 1 TABLET: 5; 325 TABLET ORAL at 22:36

## 2021-06-11 RX ADMIN — APIXABAN 5 MG: 5 TABLET, FILM COATED ORAL at 21:00

## 2021-06-11 RX ADMIN — IPRATROPIUM BROMIDE AND ALBUTEROL SULFATE 1 AMPULE: .5; 2.5 SOLUTION RESPIRATORY (INHALATION) at 20:12

## 2021-06-11 RX ADMIN — ATORVASTATIN CALCIUM 40 MG: 40 TABLET, FILM COATED ORAL at 21:30

## 2021-06-11 RX ADMIN — FUROSEMIDE 40 MG: 10 INJECTION INTRAMUSCULAR; INTRAVENOUS at 08:50

## 2021-06-11 RX ADMIN — PREGABALIN 50 MG: 25 CAPSULE ORAL at 08:51

## 2021-06-11 RX ADMIN — IPRATROPIUM BROMIDE AND ALBUTEROL SULFATE 1 AMPULE: .5; 2.5 SOLUTION RESPIRATORY (INHALATION) at 15:36

## 2021-06-11 RX ADMIN — METHYLPREDNISOLONE SODIUM SUCCINATE 80 MG: 125 INJECTION, POWDER, FOR SOLUTION INTRAMUSCULAR; INTRAVENOUS at 07:48

## 2021-06-11 RX ADMIN — INSULIN GLARGINE 10 UNITS: 100 INJECTION, SOLUTION SUBCUTANEOUS at 22:38

## 2021-06-11 RX ADMIN — AZITHROMYCIN 500 MG: 250 TABLET, FILM COATED ORAL at 09:07

## 2021-06-11 RX ADMIN — PREGABALIN 50 MG: 25 CAPSULE ORAL at 13:43

## 2021-06-11 RX ADMIN — OXYCODONE HYDROCHLORIDE AND ACETAMINOPHEN 1 TABLET: 5; 325 TABLET ORAL at 08:51

## 2021-06-11 RX ADMIN — BUDESONIDE AND FORMOTEROL FUMARATE DIHYDRATE 2 PUFF: 160; 4.5 AEROSOL RESPIRATORY (INHALATION) at 20:12

## 2021-06-11 RX ADMIN — INSULIN LISPRO 9 UNITS: 100 INJECTION, SOLUTION INTRAVENOUS; SUBCUTANEOUS at 17:02

## 2021-06-11 RX ADMIN — PREGABALIN 50 MG: 25 CAPSULE ORAL at 21:00

## 2021-06-11 RX ADMIN — OXYCODONE HYDROCHLORIDE AND ACETAMINOPHEN 1 TABLET: 5; 325 TABLET ORAL at 14:43

## 2021-06-11 RX ADMIN — APIXABAN 5 MG: 5 TABLET, FILM COATED ORAL at 08:51

## 2021-06-11 RX ADMIN — BUDESONIDE AND FORMOTEROL FUMARATE DIHYDRATE 2 PUFF: 160; 4.5 AEROSOL RESPIRATORY (INHALATION) at 07:44

## 2021-06-11 ASSESSMENT — PAIN SCALES - GENERAL
PAINLEVEL_OUTOF10: 2
PAINLEVEL_OUTOF10: 8
PAINLEVEL_OUTOF10: 10

## 2021-06-11 ASSESSMENT — PAIN DESCRIPTION - LOCATION
LOCATION: LEG

## 2021-06-11 ASSESSMENT — PAIN DESCRIPTION - PROGRESSION: CLINICAL_PROGRESSION: NOT CHANGED

## 2021-06-11 ASSESSMENT — PAIN - FUNCTIONAL ASSESSMENT: PAIN_FUNCTIONAL_ASSESSMENT: PREVENTS OR INTERFERES SOME ACTIVE ACTIVITIES AND ADLS

## 2021-06-11 ASSESSMENT — PAIN DESCRIPTION - ORIENTATION
ORIENTATION: RIGHT;LEFT;MID
ORIENTATION: RIGHT;LEFT;MID
ORIENTATION: RIGHT;LEFT;LOWER

## 2021-06-11 ASSESSMENT — PAIN DESCRIPTION - DIRECTION: RADIATING_TOWARDS: L HIP

## 2021-06-11 ASSESSMENT — PAIN DESCRIPTION - PAIN TYPE
TYPE: CHRONIC PAIN

## 2021-06-11 ASSESSMENT — PAIN DESCRIPTION - FREQUENCY: FREQUENCY: CONTINUOUS

## 2021-06-11 ASSESSMENT — PAIN DESCRIPTION - ONSET: ONSET: ON-GOING

## 2021-06-11 ASSESSMENT — PAIN DESCRIPTION - DESCRIPTORS: DESCRIPTORS: ACHING;DISCOMFORT;THROBBING

## 2021-06-11 NOTE — PROGRESS NOTES
[facility]  Office: [address], DO, Paramjit Hanson, DO, Oneida Booker, DO, Lizy Nugent Blood, DO, Prakash Ponce MD, Dalila Abarca MD, Amanda Roach MD, Mel Mcdonnell MD, Meenakshi Plunkett MD, Chandan Cope MD, Lazara Kendall MD, Lisbeth Tellez MD, Carie Gaucher, DO, Cecilia Moreland MD, Tony Flores DO, Perla Meek MD,  Luz Lundberg, DO, Xavier Betts MD, Frederick Flores MD, Obey Zavala MD, Batool Ordonez MD, Angelika Eye, Veronika Alvarez, CNP, Caridad Clements, CNP, Lawayne Klinefelter, CNS, Laila Montana, CNP, Gama Pettit, CNP, Shaw Truong, CNP, José Maradiaga, CNP, Christiano Fernandez, CNP, CYNDI OrlandoC, Solis Pang, [facility], [place]), Stephy Cisse, CNP, Nathaly Neal, CNP, Linda Ambrose, CNP, Lisa Lundberg, CNP, Kerwin Thacker, CNP, [address]    Progress Note    6/11/2021    9:57 AM    Name:   Donell Villanueva  MRN:     [id number]     [place]:      [de-identified]   Room:   [address] [place] [address] Day:  2  Admit Date:  6/9/2021  3:53 PM    PCP:   DANIEL Calderón CNP  Code Status:  Full Code    Subjective:     C/C: Breathing not much better. Chief Complaint   Patient presents with    Shortness of Breath     Interval History Status: not changed. Patient resting in bed, eating breakfast.  He reports his breathing is not much better. He has no complaints of fever, chills, abdominal pain, nausea, vomiting, chest pain. He does indicate tenderness in his lower extremities. He remains on supplemental O2 per nasal cannula at 2 L. He wore BiPAP overnight without difficulty. No issues reported overnight per nursing. He is afebrile. Brief History:     Per previous documentation:       Donell Villanueva is a 49 y.o. Non-/non  male who presents with Shortness of Breath   and is admitted to the hospital for the management of COPD (chronic obstructive pulmonary disease) [facility]      Patient insulin lispro  0-18 Units Subcutaneous TID     insulin lispro  0-9 Units Subcutaneous Nightly    insulin glargine  5 Units Subcutaneous Nightly    atorvastatin  40 mg Oral Nightly    furosemide  40 mg Intravenous Daily    apixaban  5 mg Oral BID    budesonide-formoterol  2 puff Inhalation BID    [Held by provider] furosemide  40 mg Oral Daily    pregabalin  50 mg Oral TID    sodium chloride flush  5-40 mL Intravenous 2 times per day    ipratropium-albuterol  1 ampule Inhalation Q4H WA     Continuous Infusions:    dextrose      sodium chloride      dextrose       PRN Meds: glucose, dextrose, glucagon (rDNA), dextrose, oxyCODONE-acetaminophen, hydrOXYzine, zolpidem, sodium chloride flush, sodium chloride, ondansetron **OR** ondansetron, polyethylene glycol, acetaminophen **OR** acetaminophen, albuterol, glucose, dextrose, glucagon (rDNA), dextrose    Data:     Past Medical History:   has a past medical history of COPD (chronic obstructive pulmonary disease) ([facility] [address] and Diabetes mellitus ([facility] Social History:   reports that he quit smoking about 5 months ago. His smoking use included cigarettes. He has a 25.00 pack-year smoking history. He has never used smokeless tobacco. He reports previous alcohol use. He reports previous drug use. Family History:   Family History   Problem Relation Age of Onset    Lung Cancer Mother        Vitals:  /81   Pulse 85   Temp 98.2 °F (36.8 °C) (Oral)   Resp 16   Ht 6' 3\" (1.905 m)   Wt (!) 348 lb 5.2 oz (158 kg)   SpO2 99%   BMI 43.54 kg/m²   Temp (24hrs), Av °F (36.7 °C), Min:97.6 °F (36.4 °C), Max:98.4 °F (36.9 °C)    Recent Labs     06/10/21  1218 06/10/21  1705 06/10/21  2055 21  0731   POCGLU 189* 282* 255* 169*       I/O (24Hr):     Intake/Output Summary (Last 24 hours) at 2021 0957  Last data filed at 2021 0900  Gross per 24 hour   Intake 1015 ml   Output 3025 ml   Net -2010 ml       Labs:  Hematology:  Recent Labs 06/09/21  1638 06/10/21  0618   WBC 6.6 10.6   RBC 4.52 4.60   HGB 13.4* 13.6   HCT 41.8 43.0   MCV 92.4 93.3   MCH 29.7 29.6   MCHC 32.2 31.7   RDW 17.0* 16.6*    246   MPV 8.7 9.0     Chemistry:  Recent Labs     06/09/21  1638 06/10/21  0618    137   K 4.5 4.7   * 104   CO2 26 23   GLUCOSE 158* 208*   BUN 16 15   CREATININE 0.82 0.79   ANIONGAP 6* 10   LABGLOM >60 >60   GFRAA >60 >60   CALCIUM 9.1 9.0   PROBNP 99  --    TROPHS 16  --      Recent Labs     06/09/21 1959 06/10/21  1218 06/10/21  1705 06/10/21  2055 06/11/21  0731   POCGLU 202* 189* 282* 255* 169*       Lab Results   Component Value Date/Time    SPECIAL NOT REPORTED 06/10/2021 01:48 PM     Lab Results   Component Value Date/Time    CULTURE PENDING 06/10/2021 01:48 PM       Radiology:  XR CHEST PORTABLE    Result Date: 6/10/2021  Bibasilar patchy infiltrates with obscuration of left hemidiaphragm. May represent atelectasis or consolidation and small effusion. XR CHEST PORTABLE    Result Date: 6/9/2021  Stable chest without acute process.        Physical Examination:       General appearance:  alert, cooperative and no distress at rest  Mental Status:  oriented to person, place and time and normal affect  Lungs:  Increased shortness of breath with exertion while in bed, scattered wheezing throughout all lung fields, diminished air movement throughout all lung fields, supp O2 in place  Heart:  regular rate and rhythm, no murmur  Abdomen:  soft, nontender, nondistended, normal bowel sounds, no masses, hepatomegaly, splenomegaly  Extremities:  2+ edema bilat LE, chronic vascular discoloration, tender to palp BLE (chronic)   Skin:  no gross lesions, rashes, induration    Assessment:     Hospital Problems         Last Modified POA    * (Principal) Chronic obstructive pulmonary disease with acute exacerbation ([facility] [address] 6/10/2021 Yes    Diabetes mellitus type 2, controlled ([facility] 6/10/2021 Yes    GERD (gastroesophageal reflux disease) 6/10/2021 Yes    Sleep apnea 6/10/2021 Yes    Tobacco abuse 6/10/2021 Yes    History of venous embolism 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 COPD exacerbation: Continue Symbicort. IV Solu-medrol 40 mg daily x 3 days. Procalcitonin and resp viral panel unremarkable. Sputum culture showing no significant pathogens, culture pending. Monitor off antibiotics. IV Lasix 40 mg daily as ordered. Monitor intake and output. Daily weights. 2. Diabetes type 2: Carb controlled diet. Monitor blood glucose before meals and at bedtime. High correction sliding scale insulin. Lantus as ordered. 3. Hypoxia: Supplemental O2 to keep SPO2 greater than 90%. Continuous pulse ox. 4. Sleep apnea: BiPAP at bedtime and with naps. 5. History DVT/long-term use of anticoagulants: Continue patient's home Eliquis 5 mg twice daily. 6. GERD: GI prophylaxis/Protonix 40 mg p.o. daily  7. Obesity: Weight loss management as outpatient per PCP.      DANIEL Boswell NP  6/11/2021  9:57 AM

## 2021-06-11 NOTE — FLOWSHEET NOTE
Situation:  Writer received referral to visit Mr. Ladan Aguirre from [place], [place]. Assessment:  Mr. Ladan Aguirre was sitting in the bedside chair. He told writer he as having a hard time breathing. He shared that he is not able to do much when at home. He identified his sister as his support system, noting that they are \"the only ones left. \" He agreed that he tries to be supportive of his sister as she is to him. He affirmed belief in [place]. \" He was receptive to prayer and voiced his prayer request.    Intervention:  Writer introduced herself; inquired about Pt's coping and support system; explored Pt's beliefs; offered words of support and encouragement; prayed for Pt. Outcome:  Mr. Ladan Aguirre thanked writer. 06/11/21 1529   Encounter Summary   Services provided to: Patient   Referral/Consult From: Nurse   Support System Family members   Continue Visiting   (6/11/21)   Complexity of Encounter Moderate   Length of Encounter 15 minutes   Spiritual Assessment Completed Yes   Routine   Type Follow up   Spiritual/Scientologist   Type Spiritual support   Assessment Calm; Approachable   Intervention Active listening;Explored coping resources; Explored feelings, thoughts, concerns;Sustaining presence/ Ministry of presence;Prayer   Outcome Expressed gratitude;Expressed feelings/needs/concerns;Receptive     Electronically signed by Mayda Charles, Oncology Outpatient [facility], [id number] [facility]  6/11/2021  3:30 PM

## 2021-06-11 NOTE — PROGRESS NOTES
Physical Therapy         Physical Therapy Cancel Note      DATE: 2021    NAME: Joel Espino  MRN: [id number]   : 1965      Patient not seen this date for Physical Therapy due to:    Patient Declined: Pt declined to participate in therapy on this date secondary to significant pain in LE. Pt reported pain to be tingling in feeling and rated the intesity of the pain as 11/10. RN was notfied. Writer returned to pt's room later in the afternoon to inquire about therapy participation. However, pt declined therapy participation again. Will check back with pt tomorrow. Electronically signed by ALDA Cornelius on 2021 at 2:56 PM  Evaluation/treatment performed by Student PT under the supervision of co-signing PT who agrees with all evaluation/treatment and documentation.

## 2021-06-11 NOTE — CARE COORDINATION
[facility] ICU Quality Flow/Interdisciplinary Rounds Progress Note    Quality Flow Rounds held on June 11, 2021 at [address] Attending:  Bedside Nurse, , Nursing Unit Leadership, Dietary, Respiratory Therapy, Physical Therapy, Occupational Therapy and Spiritual Care/    Anticipated Discharge Date:  Expected Discharge Date: 06/12/21    Anticipated Discharge Disposition: home    Readmission Risk              Risk of Unplanned Readmission:  14           Discussed patient goal for the day, patient clinical progression, and barriers to discharge.   The following Goal(s) of the Day/Commitment(s) have been identified:  Activity Progression  PT/OT, O2 conservation techniques and IV diuresis      Soo Goldman RN  June 11, 2021

## 2021-06-12 VITALS
HEIGHT: 75 IN | DIASTOLIC BLOOD PRESSURE: 91 MMHG | OXYGEN SATURATION: 96 % | RESPIRATION RATE: 16 BRPM | BODY MASS INDEX: 39.17 KG/M2 | HEART RATE: 63 BPM | WEIGHT: 315 LBS | SYSTOLIC BLOOD PRESSURE: 132 MMHG | TEMPERATURE: 98.4 F

## 2021-06-12 PROBLEM — Z72.0 TOBACCO ABUSE: Status: RESOLVED | Noted: 2021-05-09 | Resolved: 2021-06-12

## 2021-06-12 PROBLEM — R09.02 HYPOXIA: Status: RESOLVED | Noted: 2021-05-09 | Resolved: 2021-06-12

## 2021-06-12 PROBLEM — J44.1 CHRONIC OBSTRUCTIVE PULMONARY DISEASE WITH ACUTE EXACERBATION (HCC): Status: RESOLVED | Noted: 2021-05-09 | Resolved: 2021-06-12

## 2021-06-12 LAB
ANION GAP SERPL CALCULATED.3IONS-SCNC: 7 MMOL/L (ref 9–17)
BUN BLDV-MCNC: 32 MG/DL (ref 6–20)
BUN/CREAT BLD: ABNORMAL (ref 9–20)
CALCIUM SERPL-MCNC: 8.9 MG/DL (ref 8.6–10.4)
CHLORIDE BLD-SCNC: 104 MMOL/L (ref 98–107)
CO2: 25 MMOL/L (ref 20–31)
CREAT SERPL-MCNC: 0.93 MG/DL (ref 0.7–1.2)
GFR AFRICAN AMERICAN: >60 ML/MIN
GFR NON-AFRICAN AMERICAN: >60 ML/MIN
GFR SERPL CREATININE-BSD FRML MDRD: ABNORMAL ML/MIN/{1.73_M2}
GFR SERPL CREATININE-BSD FRML MDRD: ABNORMAL ML/MIN/{1.73_M2}
GLUCOSE BLD-MCNC: 128 MG/DL (ref 70–99)
GLUCOSE BLD-MCNC: 95 MG/DL (ref 75–110)
HCT VFR BLD CALC: 42.6 % (ref 41–53)
HEMOGLOBIN: 13.6 G/DL (ref 13.5–17.5)
MCH RBC QN AUTO: 29.1 PG (ref 26–34)
MCHC RBC AUTO-ENTMCNC: 31.8 G/DL (ref 31–37)
MCV RBC AUTO: 91.4 FL (ref 80–100)
NRBC AUTOMATED: ABNORMAL PER 100 WBC
PDW BLD-RTO: 17.1 % (ref 12.5–15.4)
PLATELET # BLD: 218 K/UL (ref 140–450)
PMV BLD AUTO: 9.3 FL (ref 6–12)
POTASSIUM SERPL-SCNC: 4.2 MMOL/L (ref 3.7–5.3)
RBC # BLD: 4.66 M/UL (ref 4.5–5.9)
SODIUM BLD-SCNC: 136 MMOL/L (ref 135–144)
WBC # BLD: 13.8 K/UL (ref 3.5–11)

## 2021-06-12 PROCEDURE — 36415 COLL VENOUS BLD VENIPUNCTURE: CPT

## 2021-06-12 PROCEDURE — 99239 HOSP IP/OBS DSCHRG MGMT >30: CPT | Performed by: NURSE PRACTITIONER

## 2021-06-12 PROCEDURE — 94640 AIRWAY INHALATION TREATMENT: CPT

## 2021-06-12 PROCEDURE — 6370000000 HC RX 637 (ALT 250 FOR IP): Performed by: NURSE PRACTITIONER

## 2021-06-12 PROCEDURE — 85027 COMPLETE CBC AUTOMATED: CPT

## 2021-06-12 PROCEDURE — 94761 N-INVAS EAR/PLS OXIMETRY MLT: CPT

## 2021-06-12 PROCEDURE — 6360000002 HC RX W HCPCS: Performed by: FAMILY MEDICINE

## 2021-06-12 PROCEDURE — 82947 ASSAY GLUCOSE BLOOD QUANT: CPT

## 2021-06-12 PROCEDURE — 6370000000 HC RX 637 (ALT 250 FOR IP): Performed by: FAMILY MEDICINE

## 2021-06-12 PROCEDURE — 80048 BASIC METABOLIC PNL TOTAL CA: CPT

## 2021-06-12 RX ORDER — PREDNISONE 20 MG/1
40 TABLET ORAL DAILY
Qty: 6 TABLET | Refills: 0 | Status: SHIPPED | OUTPATIENT
Start: 2021-06-13 | End: 2021-06-16

## 2021-06-12 RX ORDER — FUROSEMIDE 40 MG/1
40 TABLET ORAL 2 TIMES DAILY
Qty: 60 TABLET | Refills: 1 | Status: SHIPPED | OUTPATIENT
Start: 2021-06-12

## 2021-06-12 RX ORDER — ATORVASTATIN CALCIUM 40 MG/1
40 TABLET, FILM COATED ORAL NIGHTLY
Qty: 30 TABLET | Refills: 0 | Status: SHIPPED | OUTPATIENT
Start: 2021-06-12

## 2021-06-12 RX ADMIN — PREGABALIN 50 MG: 25 CAPSULE ORAL at 08:08

## 2021-06-12 RX ADMIN — PANTOPRAZOLE SODIUM 40 MG: 40 TABLET, DELAYED RELEASE ORAL at 07:05

## 2021-06-12 RX ADMIN — FUROSEMIDE 40 MG: 10 INJECTION INTRAMUSCULAR; INTRAVENOUS at 08:09

## 2021-06-12 RX ADMIN — BUDESONIDE AND FORMOTEROL FUMARATE DIHYDRATE 2 PUFF: 160; 4.5 AEROSOL RESPIRATORY (INHALATION) at 07:37

## 2021-06-12 RX ADMIN — OXYCODONE HYDROCHLORIDE AND ACETAMINOPHEN 1 TABLET: 5; 325 TABLET ORAL at 05:24

## 2021-06-12 RX ADMIN — IPRATROPIUM BROMIDE AND ALBUTEROL SULFATE 1 AMPULE: .5; 2.5 SOLUTION RESPIRATORY (INHALATION) at 07:37

## 2021-06-12 RX ADMIN — PREDNISONE 40 MG: 20 TABLET ORAL at 08:09

## 2021-06-12 RX ADMIN — DOCUSATE SODIUM 100 MG: 100 CAPSULE, LIQUID FILLED ORAL at 08:08

## 2021-06-12 RX ADMIN — APIXABAN 5 MG: 5 TABLET, FILM COATED ORAL at 08:09

## 2021-06-12 RX ADMIN — OXYCODONE HYDROCHLORIDE AND ACETAMINOPHEN 1 TABLET: 5; 325 TABLET ORAL at 09:45

## 2021-06-12 RX ADMIN — HYDROXYZINE HYDROCHLORIDE 25 MG: 25 TABLET, FILM COATED ORAL at 09:45

## 2021-06-12 ASSESSMENT — PAIN SCALES - GENERAL
PAINLEVEL_OUTOF10: 9
PAINLEVEL_OUTOF10: 7
PAINLEVEL_OUTOF10: 3

## 2021-06-12 ASSESSMENT — PAIN DESCRIPTION - LOCATION: LOCATION: LEG

## 2021-06-12 ASSESSMENT — PAIN DESCRIPTION - DESCRIPTORS: DESCRIPTORS: ACHING

## 2021-06-12 ASSESSMENT — PAIN DESCRIPTION - PAIN TYPE: TYPE: CHRONIC PAIN

## 2021-06-12 ASSESSMENT — PAIN DESCRIPTION - PROGRESSION: CLINICAL_PROGRESSION: NOT CHANGED

## 2021-06-12 ASSESSMENT — PAIN - FUNCTIONAL ASSESSMENT: PAIN_FUNCTIONAL_ASSESSMENT: PREVENTS OR INTERFERES SOME ACTIVE ACTIVITIES AND ADLS

## 2021-06-12 ASSESSMENT — PAIN DESCRIPTION - ORIENTATION: ORIENTATION: LEFT;RIGHT

## 2021-06-12 ASSESSMENT — PAIN DESCRIPTION - FREQUENCY: FREQUENCY: CONTINUOUS

## 2021-06-12 ASSESSMENT — PAIN DESCRIPTION - ONSET: ONSET: ON-GOING

## 2021-06-12 NOTE — PLAN OF CARE
Problem: Falls - Risk of:  Goal: Will remain free from falls  6/10/2021 1012 by Jose Maria Chavez RN  Outcome: Ongoing   Fall assessment preformed. Bed in low locked position with call light and tray table within reach. Education given. Will continue to monitor. Problem: Skin Integrity:  Goal: Will show no infection signs and symptoms  6/10/2021 1632 by Jose Maria Chavez RN  Outcome: Ongoing   Skin assessment preformed. Pt turned every 2 hours with heals elevated off bed. Waffle mattress in place. Will continue to monitor. Problem: Health Behavior:  Goal: Ability to identify and utilize available resources and services will improve  6/10/2021 1012 by Jose Maria Chavez RN  Outcome: Ongoing   Education given on the importance of maintaining and checking blood sugars. Reviewed and re-educated on current diabetic medication and low carb diet. Patient verbalizes understanding. Problem: Pain:  Goal: Pain level will decrease  Outcome: Ongoing   Pain scale preformed per protocol and pt treated for pain as documented. Education given.
Problem: Falls - Risk of:  Goal: Will remain free from falls  Description: Will remain free from falls  Outcome: Ongoing   Fall assessment preformed. Bed in low locked position with call light and tray table within reach. Education given. Will continue to monitor. Problem: Skin Integrity:  Goal: Will show no infection signs and symptoms  Description: Will show no infection signs and symptoms  Outcome: Ongoing     Skin assessment preformed. Pt turned every 2 hours with heals elevated off bed. Waffle mattress in place. Will continue to monitor. Problem: Pain:  Goal: Pain level will decrease  Description: Pain level will decrease  Outcome: Ongoing     Pain scale preformed per protocol and pt treated for pain as documented. Education given.
Problem: Falls - Risk of:  Goal: Will remain free from falls  Description: Will remain free from falls  Outcome: Ongoing  Goal: Absence of physical injury  Description: Absence of physical injury  Outcome: Ongoing     Problem: Skin Integrity:  Goal: Will show no infection signs and symptoms  Description: Will show no infection signs and symptoms  Outcome: Ongoing  Goal: Absence of new skin breakdown  Description: Absence of new skin breakdown  Outcome: Ongoing
RT in to administer Aerosol therapy and Mdi . Pt  currently in the Lazy-boy chair watching Tv . Room air sat observed at 95% . Breath sounds reveal diminished with diminished with expiratory wheezes . The Bipap unit is on stand by at the bedside , pt uses at  .
pain throughout shift and administer pain medication as needed     Problem: Musculor/Skeletal Functional Status  Goal: Highest potential functional level  6/11/2021 0540 by Wandy Dailey  Outcome: Ongoing    : Will assist patient with ADL's and will keep safe during these tasks. Assessing for weakness and ambulation trouble. Will use equipment as necessary and keep an open dialogue with patient in terms of safety and physical ability. Problem: Breathing Pattern - Ineffective:  Goal: Ability to achieve and maintain a regular respiratory rate will improve  Description: Ability to achieve and maintain a regular respiratory rate will improve  Outcome: Ongoing    : Will monitor SpO2, lung sounds, and will notify RT when necessary.     : Patient is in bed with no needs at this time. Call light is within reach.  Will continue to monitor and assess hourly/PRN

## 2021-06-12 NOTE — DISCHARGE SUMMARY
[facility]  Office: [address], DO, Mady Horner, DO, Ashley Campbell, DO, Farhad Topete, DO, [employer] Westfall MD, Gene Licea MD, Giovanni Grullon MD, Jennifer Fox MD, Judy Moe MD, Elicia Curry MD, Les Arana MD, Emmy Ndiaye MD, Ashley Nicholson DO, Jeff Jo MD, Rusty Davila DO, Maria G Rodriguez MD,  Eugene Hodge DO, Finesse Angela MD, Italo Cevallos MD, Mathew Meredith MD, Braulio Caraballo MD, Tova Owen, [facility], [facility], CNP, Karl Garcia, CNP, Morena Braswell, CNS, Merle Foy, CNP, Anju Anderson, CNP, Massiel Irene, CNP, Javier Courtney, CNP, Freeman Herrera, CNP, Curly Galo PA-C, Corinne Canchola, [facility], Mony Geiger, CNP, Anali Conn, CNP, Savi Randle, CNP, Nicoletta Kocher, CNP, Beverly Rubi, CNP, Kd Siemens, [facility] [address]    Discharge Summary     Patient ID: Maria Elena Field  :  1965   MRN: [id number]     ACCOUNT:  [de-identified]   Patient's PCP: DANIEL Cr CNP  Admit Date: 2021   Discharge Date: 2021   Length of Stay: 3  Code Status:  Full Code  Admitting Physician: Jennifer Fox MD  Discharge Physician: DANIEL Estes - NP     Active Discharge Diagnoses:     Hospital Problem Lists:  Principal Problem (Resolved):    Chronic obstructive pulmonary disease with acute exacerbation ([facility] [address]  Active Problems:    Diabetes mellitus type 2, controlled ([facility] [address]    GERD (gastroesophageal reflux disease)    Sleep apnea    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 male who was admitted for the management of  Chronic obstructive pulmonary disease with acute exacerbation ([facility] [address] , presented to ER with Shortness of Breath    Day of admission, patient woke up early in the morning gasping for air despite wearing his CPAP. He took 2 breathing treatments and wore his CPAP for the remainder of the day with no improvement. EMS was called to bring him to the ED. He says he also had noticed increased swelling in his legs that started about 2 weeks ago. He is a former smoker but quit smoking in January 2021. He was last hospitalized for COPD exacerbation in May 2021. Patient reported he could not recall following up with pulmonologist upon discharge from the hospital.  Chest x-ray revealed no acute process. Twelve-lead EKG revealed sinus rhythm with a heart rate of 90 and no ST, T wave, or Q wave abnormalities. Receive DuoNeb treatments and IV Solu-Medrol. During the stay, he also was diuresed with IV Lasix. He wore BiPAP at night and required supplemental O2. Supplemental O2 was able to be weaned prior to discharge. IV steroid was switched to p.o. prior to discharge, and his usual dose of oral Lasix was resumed of 40 mg/day.       Significant therapeutic interventions:     IV steroid    Supplemental O2    Zithromax    Incentive spirometry    IV Lasix    BiPAP at night      Significant Diagnostic Studies: As above  Labs / Micro:  CBC:   Lab Results   Component Value Date    WBC 13.8 06/12/2021    RBC 4.66 06/12/2021    HGB 13.6 06/12/2021    HCT 42.6 06/12/2021    MCV 91.4 06/12/2021    MCH 29.1 06/12/2021    MCHC 31.8 06/12/2021    RDW 17.1 06/12/2021     06/12/2021     BMP:    Lab Results   Component Value Date    GLUCOSE 128 06/12/2021     06/12/2021    K 4.2 06/12/2021     06/12/2021    CO2 25 06/12/2021    ANIONGAP 7 06/12/2021    BUN 32 06/12/2021    CREATININE 0.93 06/12/2021    BUNCRER NOT REPORTED 06/12/2021    CALCIUM 8.9 06/12/2021    LABGLOM >60 06/12/2021    GFRAA >60 06/12/2021    GFR      06/12/2021    GFR NOT REPORTED 06/12/2021          Radiology:  XR CHEST PORTABLE    Result Date: 6/10/2021  Bibasilar patchy infiltrates with obscuration of left hemidiaphragm. May represent atelectasis or consolidation and small effusion. XR CHEST PORTABLE    Result Date: 6/9/2021  Stable chest without acute process. Consultations:    Consults:     Final Specialist Recommendations/Findings:   None      The patient was seen and examined on day of discharge.      Review of Systems:      Constitutional:  negative for chills, fevers, sweats  Respiratory:  negative for cough, dyspnea on exertion, shortness of breath, wheezing   Cardiovascular:  negative for chest pain, chest pressure/discomfort, + lower extremity edema, no palpitations  Gastrointestinal:  negative for abdominal pain, constipation, diarrhea, nausea, vomiting  Neurological:  negative for dizziness, headache      Physical Examination:         General appearance:  alert, cooperative and no distress  Mental Status:  oriented to person, place and time and normal affect  Lungs: +  scattered expiratory wheezes throughout, normal effort, on room air  Heart:  regular rate and rhythm, no murmur  Abdomen:  soft, nontender, nondistended, normal bowel sounds, no masses, hepatomegaly, splenomegaly  Extremities:  1+ edema bilat LE, chronic vascular discoloration BLE, no tenderness in the calves  Skin:  no gross lesions, rashes, induration    Discharge plan:     Disposition: Home    Physician Follow Up:     Rene Galvez DO  [address] [facility] [address]. [phone]    Schedule an appointment as soon as possible for a visit in 1 week  Make an appointment within 1 week for further management of COPD    DANIEL Garcia - CNP  [place], [address] [address]  [phone]    Schedule an appointment as soon as possible for a visit in 1 week  Follow-up within 1 week       Requiring Further Evaluation/Follow Up POST HOSPITALIZATION/Incidental Findings:     Recommend PFTs    Follow-up with recommendations from pulmonology after patient sees as outpatient    Recommend pulmonary rehab    Diabetic diet education     Check A1c in August- A1c 5.6 May 2021    Recommend discussing weight loss strategies with patient    Diet: diabetic diet    Activity: As tolerated    Instructions to Patient:     Take medications as prescribed    Use incentive spirometer while at home    Take respiratory medications as prescribed    Continue to use CPAP at night and with naps    Go see pulmonologist as outpatient    Follow-up with PCP within 1 week        Discharge Medications:      Medication List      START taking these medications    atorvastatin 40 MG tablet  Commonly known as: LIPITOR  Take 1 tablet by mouth nightly     predniSONE 20 MG tablet  Commonly known as: DELTASONE  Take 2 tablets by mouth daily for 3 days  Start taking on: June 13, 2021        CHANGE how you take these medications    furosemide 40 MG tablet  Commonly known as: LASIX  Take 1 tablet by mouth 2 times daily  What changed: See the new instructions.         CONTINUE taking these medications    albuterol (2.5 MG/3ML) 0.083% nebulizer solution  Commonly known as: PROVENTIL     Eliquis 5 MG Tabs tablet  Generic drug: apixaban     hydrOXYzine 25 MG capsule  Commonly known as: VISTARIL     pregabalin 50 MG capsule  Commonly known as: LYRICA     Symbicort 160-4.5 MCG/ACT Aero  Generic drug: budesonide-formoterol     traMADol 50 MG tablet  Commonly known as: ULTRAM     zolpidem 10 MG tablet  Commonly known as: AMBIEN        STOP taking these medications    hydrOXYzine 25 MG tablet  Commonly known as: ATARAX           Where to Get Your Medications      These medications were sent to [facility], [address] [phone] [address] [facility] [address]    Phone: [phone]   · atorvastatin 40 MG tablet  · furosemide 40 MG tablet  · predniSONE 20 MG tablet         Discharge Procedure Orders   Basic Metabolic Panel   Standing Status: Future Standing Exp. Date: 06/12/22   Order Comments: Please send results to DANIEL Carter CNP       Time Spent on discharge is  32 mins in patient examination, evaluation, counseling as well as medication reconciliation, prescriptions for required medications, discharge plan and follow up. Electronically signed by   DANIEL Galeas NP  6/12/2021  11:03 AM      Thank you DANIEL Pineda CNP for the opportunity to be involved in this patient's care.

## 2021-07-10 ENCOUNTER — APPOINTMENT (OUTPATIENT)
Dept: GENERAL RADIOLOGY | Age: 56
DRG: 140 | End: 2021-07-10
Payer: MEDICAID

## 2021-07-10 ENCOUNTER — HOSPITAL ENCOUNTER (INPATIENT)
Age: 56
LOS: 2 days | Discharge: HOME | DRG: 140 | End: 2021-07-13
Attending: EMERGENCY MEDICINE | Admitting: INTERNAL MEDICINE
Payer: MEDICAID

## 2021-07-10 DIAGNOSIS — J44.1 COPD EXACERBATION (HCC): Primary | ICD-10-CM

## 2021-07-10 DIAGNOSIS — J44.1 CHRONIC OBSTRUCTIVE PULMONARY DISEASE WITH ACUTE EXACERBATION (HCC): ICD-10-CM

## 2021-07-10 LAB
ABSOLUTE EOS #: 0.5 K/UL (ref 0–0.4)
ABSOLUTE IMMATURE GRANULOCYTE: ABNORMAL K/UL (ref 0–0.3)
ABSOLUTE LYMPH #: 2.1 K/UL (ref 1–4.8)
ABSOLUTE MONO #: 0.7 K/UL (ref 0.1–1.2)
ALBUMIN SERPL-MCNC: 3.7 G/DL (ref 3.5–5.2)
ALBUMIN/GLOBULIN RATIO: 1.2 (ref 1–2.5)
ALP BLD-CCNC: 150 U/L (ref 40–129)
ALT SERPL-CCNC: 11 U/L (ref 5–41)
ANION GAP SERPL CALCULATED.3IONS-SCNC: 12 MMOL/L (ref 9–17)
AST SERPL-CCNC: 12 U/L
BASOPHILS # BLD: 1 % (ref 0–2)
BASOPHILS ABSOLUTE: 0.1 K/UL (ref 0–0.2)
BILIRUB SERPL-MCNC: 0.39 MG/DL (ref 0.3–1.2)
BNP INTERPRETATION: NORMAL
BUN BLDV-MCNC: 15 MG/DL (ref 6–20)
BUN/CREAT BLD: ABNORMAL (ref 9–20)
CALCIUM SERPL-MCNC: 9.3 MG/DL (ref 8.6–10.4)
CHLORIDE BLD-SCNC: 105 MMOL/L (ref 98–107)
CO2: 24 MMOL/L (ref 20–31)
CREAT SERPL-MCNC: 1.03 MG/DL (ref 0.7–1.2)
DIFFERENTIAL TYPE: ABNORMAL
EOSINOPHILS RELATIVE PERCENT: 6 % (ref 1–4)
GFR AFRICAN AMERICAN: >60 ML/MIN
GFR NON-AFRICAN AMERICAN: >60 ML/MIN
GFR SERPL CREATININE-BSD FRML MDRD: ABNORMAL ML/MIN/{1.73_M2}
GFR SERPL CREATININE-BSD FRML MDRD: ABNORMAL ML/MIN/{1.73_M2}
GLUCOSE BLD-MCNC: 154 MG/DL (ref 70–99)
HCT VFR BLD CALC: 43.2 % (ref 41–53)
HEMOGLOBIN: 14.1 G/DL (ref 13.5–17.5)
IMMATURE GRANULOCYTES: ABNORMAL %
INR BLD: 1
LACTIC ACID, SEPSIS WHOLE BLOOD: NORMAL MMOL/L (ref 0.5–1.9)
LACTIC ACID, SEPSIS: 1.6 MMOL/L (ref 0.5–1.9)
LIPASE: 22 U/L (ref 13–60)
LYMPHOCYTES # BLD: 26 % (ref 24–44)
MCH RBC QN AUTO: 29.9 PG (ref 26–34)
MCHC RBC AUTO-ENTMCNC: 32.6 G/DL (ref 31–37)
MCV RBC AUTO: 91.8 FL (ref 80–100)
MONOCYTES # BLD: 8 % (ref 2–11)
NRBC AUTOMATED: ABNORMAL PER 100 WBC
PARTIAL THROMBOPLASTIN TIME: 26.2 SEC (ref 21.3–31.3)
PDW BLD-RTO: 16 % (ref 12.5–15.4)
PLATELET # BLD: 237 K/UL (ref 140–450)
PLATELET ESTIMATE: ABNORMAL
PMV BLD AUTO: 8.9 FL (ref 6–12)
POTASSIUM SERPL-SCNC: 4.3 MMOL/L (ref 3.7–5.3)
PRO-BNP: 75 PG/ML
PROTHROMBIN TIME: 10.1 SEC (ref 9.4–12.6)
RBC # BLD: 4.71 M/UL (ref 4.5–5.9)
RBC # BLD: ABNORMAL 10*6/UL
SEG NEUTROPHILS: 59 % (ref 36–66)
SEGMENTED NEUTROPHILS ABSOLUTE COUNT: 4.9 K/UL (ref 1.8–7.7)
SODIUM BLD-SCNC: 141 MMOL/L (ref 135–144)
TOTAL PROTEIN: 6.9 G/DL (ref 6.4–8.3)
TROPONIN INTERP: NORMAL
TROPONIN T: NORMAL NG/ML
TROPONIN, HIGH SENSITIVITY: 12 NG/L (ref 0–22)
WBC # BLD: 8.3 K/UL (ref 3.5–11)
WBC # BLD: ABNORMAL 10*3/UL

## 2021-07-10 PROCEDURE — 71045 X-RAY EXAM CHEST 1 VIEW: CPT

## 2021-07-10 PROCEDURE — 80053 COMPREHEN METABOLIC PANEL: CPT

## 2021-07-10 PROCEDURE — 83036 HEMOGLOBIN GLYCOSYLATED A1C: CPT

## 2021-07-10 PROCEDURE — 6360000002 HC RX W HCPCS: Performed by: EMERGENCY MEDICINE

## 2021-07-10 PROCEDURE — 84484 ASSAY OF TROPONIN QUANT: CPT

## 2021-07-10 PROCEDURE — 93005 ELECTROCARDIOGRAM TRACING: CPT | Performed by: EMERGENCY MEDICINE

## 2021-07-10 PROCEDURE — 85610 PROTHROMBIN TIME: CPT

## 2021-07-10 PROCEDURE — 6370000000 HC RX 637 (ALT 250 FOR IP): Performed by: EMERGENCY MEDICINE

## 2021-07-10 PROCEDURE — 96374 THER/PROPH/DIAG INJ IV PUSH: CPT

## 2021-07-10 PROCEDURE — 36415 COLL VENOUS BLD VENIPUNCTURE: CPT

## 2021-07-10 PROCEDURE — 99285 EMERGENCY DEPT VISIT HI MDM: CPT

## 2021-07-10 PROCEDURE — 85730 THROMBOPLASTIN TIME PARTIAL: CPT

## 2021-07-10 PROCEDURE — 83880 ASSAY OF NATRIURETIC PEPTIDE: CPT

## 2021-07-10 PROCEDURE — 85025 COMPLETE CBC W/AUTO DIFF WBC: CPT

## 2021-07-10 PROCEDURE — 83690 ASSAY OF LIPASE: CPT

## 2021-07-10 PROCEDURE — 83605 ASSAY OF LACTIC ACID: CPT

## 2021-07-10 RX ORDER — ALBUTEROL SULFATE 2.5 MG/3ML
2.5 SOLUTION RESPIRATORY (INHALATION) ONCE
Status: COMPLETED | OUTPATIENT
Start: 2021-07-10 | End: 2021-07-10

## 2021-07-10 RX ORDER — IPRATROPIUM BROMIDE AND ALBUTEROL SULFATE 2.5; .5 MG/3ML; MG/3ML
1 SOLUTION RESPIRATORY (INHALATION) ONCE
Status: COMPLETED | OUTPATIENT
Start: 2021-07-10 | End: 2021-07-10

## 2021-07-10 RX ORDER — METHYLPREDNISOLONE SODIUM SUCCINATE 125 MG/2ML
125 INJECTION, POWDER, LYOPHILIZED, FOR SOLUTION INTRAMUSCULAR; INTRAVENOUS ONCE
Status: COMPLETED | OUTPATIENT
Start: 2021-07-10 | End: 2021-07-10

## 2021-07-10 RX ADMIN — IPRATROPIUM BROMIDE AND ALBUTEROL SULFATE 1 AMPULE: .5; 3 SOLUTION RESPIRATORY (INHALATION) at 22:59

## 2021-07-10 RX ADMIN — METHYLPREDNISOLONE SODIUM SUCCINATE 125 MG: 125 INJECTION, POWDER, FOR SOLUTION INTRAMUSCULAR; INTRAVENOUS at 23:05

## 2021-07-10 RX ADMIN — ALBUTEROL SULFATE 2.5 MG: 2.5 SOLUTION RESPIRATORY (INHALATION) at 22:59

## 2021-07-10 ASSESSMENT — ENCOUNTER SYMPTOMS
SORE THROAT: 0
COUGH: 1
ABDOMINAL PAIN: 0
VOMITING: 0
SHORTNESS OF BREATH: 1
DIARRHEA: 0
NAUSEA: 0

## 2021-07-11 LAB
ESTIMATED AVERAGE GLUCOSE: 131 MG/DL
GLUCOSE BLD-MCNC: 203 MG/DL (ref 75–110)
GLUCOSE BLD-MCNC: 227 MG/DL (ref 75–110)
GLUCOSE BLD-MCNC: 227 MG/DL (ref 75–110)
GLUCOSE BLD-MCNC: 273 MG/DL (ref 75–110)
HBA1C MFR BLD: 6.2 % (ref 4–6)
LACTIC ACID, SEPSIS WHOLE BLOOD: NORMAL MMOL/L (ref 0.5–1.9)
LACTIC ACID, SEPSIS: 1.3 MMOL/L (ref 0.5–1.9)
TROPONIN INTERP: NORMAL
TROPONIN T: NORMAL NG/ML
TROPONIN, HIGH SENSITIVITY: 11 NG/L (ref 0–22)

## 2021-07-11 PROCEDURE — 6370000000 HC RX 637 (ALT 250 FOR IP): Performed by: NURSE PRACTITIONER

## 2021-07-11 PROCEDURE — 6360000002 HC RX W HCPCS: Performed by: NURSE PRACTITIONER

## 2021-07-11 PROCEDURE — 36415 COLL VENOUS BLD VENIPUNCTURE: CPT

## 2021-07-11 PROCEDURE — 2580000003 HC RX 258: Performed by: NURSE PRACTITIONER

## 2021-07-11 PROCEDURE — 82947 ASSAY GLUCOSE BLOOD QUANT: CPT

## 2021-07-11 PROCEDURE — 2060000000 HC ICU INTERMEDIATE R&B

## 2021-07-11 PROCEDURE — 83605 ASSAY OF LACTIC ACID: CPT

## 2021-07-11 PROCEDURE — 99222 1ST HOSP IP/OBS MODERATE 55: CPT | Performed by: NURSE PRACTITIONER

## 2021-07-11 PROCEDURE — 80074 ACUTE HEPATITIS PANEL: CPT

## 2021-07-11 PROCEDURE — 94640 AIRWAY INHALATION TREATMENT: CPT

## 2021-07-11 PROCEDURE — 2700000000 HC OXYGEN THERAPY PER DAY

## 2021-07-11 PROCEDURE — 84484 ASSAY OF TROPONIN QUANT: CPT

## 2021-07-11 PROCEDURE — 94761 N-INVAS EAR/PLS OXIMETRY MLT: CPT

## 2021-07-11 PROCEDURE — 87389 HIV-1 AG W/HIV-1&-2 AB AG IA: CPT

## 2021-07-11 RX ORDER — SODIUM CHLORIDE 9 MG/ML
25 INJECTION, SOLUTION INTRAVENOUS PRN
Status: DISCONTINUED | OUTPATIENT
Start: 2021-07-11 | End: 2021-07-13 | Stop reason: HOSPADM

## 2021-07-11 RX ORDER — DEXTROSE MONOHYDRATE 50 MG/ML
100 INJECTION, SOLUTION INTRAVENOUS PRN
Status: DISCONTINUED | OUTPATIENT
Start: 2021-07-11 | End: 2021-07-13 | Stop reason: HOSPADM

## 2021-07-11 RX ORDER — DEXTROSE MONOHYDRATE 25 G/50ML
12.5 INJECTION, SOLUTION INTRAVENOUS PRN
Status: DISCONTINUED | OUTPATIENT
Start: 2021-07-11 | End: 2021-07-13 | Stop reason: HOSPADM

## 2021-07-11 RX ORDER — SODIUM CHLORIDE 0.9 % (FLUSH) 0.9 %
5-40 SYRINGE (ML) INJECTION PRN
Status: DISCONTINUED | OUTPATIENT
Start: 2021-07-11 | End: 2021-07-13 | Stop reason: HOSPADM

## 2021-07-11 RX ORDER — ATORVASTATIN CALCIUM 40 MG/1
40 TABLET, FILM COATED ORAL NIGHTLY
Status: DISCONTINUED | OUTPATIENT
Start: 2021-07-11 | End: 2021-07-13 | Stop reason: HOSPADM

## 2021-07-11 RX ORDER — HYDROXYZINE HYDROCHLORIDE 25 MG/1
25 TABLET, FILM COATED ORAL 3 TIMES DAILY PRN
Status: DISCONTINUED | OUTPATIENT
Start: 2021-07-11 | End: 2021-07-13 | Stop reason: HOSPADM

## 2021-07-11 RX ORDER — PREDNISONE 20 MG/1
40 TABLET ORAL DAILY
Status: DISCONTINUED | OUTPATIENT
Start: 2021-07-13 | End: 2021-07-13 | Stop reason: HOSPADM

## 2021-07-11 RX ORDER — PREGABALIN 25 MG/1
50 CAPSULE ORAL 3 TIMES DAILY
Status: DISCONTINUED | OUTPATIENT
Start: 2021-07-11 | End: 2021-07-13 | Stop reason: HOSPADM

## 2021-07-11 RX ORDER — OXYCODONE HYDROCHLORIDE AND ACETAMINOPHEN 5; 325 MG/1; MG/1
1 TABLET ORAL EVERY 8 HOURS PRN
COMMUNITY
Start: 2021-06-02

## 2021-07-11 RX ORDER — POLYETHYLENE GLYCOL 3350 17 G/17G
17 POWDER, FOR SOLUTION ORAL DAILY PRN
Status: DISCONTINUED | OUTPATIENT
Start: 2021-07-11 | End: 2021-07-13 | Stop reason: HOSPADM

## 2021-07-11 RX ORDER — FUROSEMIDE 20 MG/1
40 TABLET ORAL 2 TIMES DAILY
Status: DISCONTINUED | OUTPATIENT
Start: 2021-07-11 | End: 2021-07-13 | Stop reason: HOSPADM

## 2021-07-11 RX ORDER — ONDANSETRON 4 MG/1
4 TABLET, ORALLY DISINTEGRATING ORAL EVERY 8 HOURS PRN
Status: DISCONTINUED | OUTPATIENT
Start: 2021-07-11 | End: 2021-07-13 | Stop reason: HOSPADM

## 2021-07-11 RX ORDER — ONDANSETRON 2 MG/ML
4 INJECTION INTRAMUSCULAR; INTRAVENOUS EVERY 6 HOURS PRN
Status: DISCONTINUED | OUTPATIENT
Start: 2021-07-11 | End: 2021-07-13 | Stop reason: HOSPADM

## 2021-07-11 RX ORDER — PANTOPRAZOLE SODIUM 40 MG/1
40 TABLET, DELAYED RELEASE ORAL
Status: DISCONTINUED | OUTPATIENT
Start: 2021-07-11 | End: 2021-07-13 | Stop reason: HOSPADM

## 2021-07-11 RX ORDER — SODIUM CHLORIDE 0.9 % (FLUSH) 0.9 %
5-40 SYRINGE (ML) INJECTION EVERY 12 HOURS SCHEDULED
Status: DISCONTINUED | OUTPATIENT
Start: 2021-07-11 | End: 2021-07-13 | Stop reason: HOSPADM

## 2021-07-11 RX ORDER — TRAMADOL HYDROCHLORIDE 50 MG/1
50 TABLET ORAL ONCE
Status: COMPLETED | OUTPATIENT
Start: 2021-07-11 | End: 2021-07-11

## 2021-07-11 RX ORDER — ACETAMINOPHEN 325 MG/1
650 TABLET ORAL EVERY 6 HOURS PRN
Status: DISCONTINUED | OUTPATIENT
Start: 2021-07-11 | End: 2021-07-13 | Stop reason: HOSPADM

## 2021-07-11 RX ORDER — NICOTINE POLACRILEX 4 MG
15 LOZENGE BUCCAL PRN
Status: DISCONTINUED | OUTPATIENT
Start: 2021-07-11 | End: 2021-07-13 | Stop reason: HOSPADM

## 2021-07-11 RX ORDER — ACETAMINOPHEN 650 MG/1
650 SUPPOSITORY RECTAL EVERY 6 HOURS PRN
Status: DISCONTINUED | OUTPATIENT
Start: 2021-07-11 | End: 2021-07-13 | Stop reason: HOSPADM

## 2021-07-11 RX ORDER — ALBUTEROL SULFATE 2.5 MG/3ML
2.5 SOLUTION RESPIRATORY (INHALATION)
Status: DISCONTINUED | OUTPATIENT
Start: 2021-07-11 | End: 2021-07-13 | Stop reason: HOSPADM

## 2021-07-11 RX ORDER — IPRATROPIUM BROMIDE AND ALBUTEROL SULFATE 2.5; .5 MG/3ML; MG/3ML
1 SOLUTION RESPIRATORY (INHALATION)
Status: DISCONTINUED | OUTPATIENT
Start: 2021-07-11 | End: 2021-07-13 | Stop reason: HOSPADM

## 2021-07-11 RX ORDER — METHYLPREDNISOLONE SODIUM SUCCINATE 40 MG/ML
40 INJECTION, POWDER, LYOPHILIZED, FOR SOLUTION INTRAMUSCULAR; INTRAVENOUS EVERY 6 HOURS
Status: COMPLETED | OUTPATIENT
Start: 2021-07-11 | End: 2021-07-12

## 2021-07-11 RX ADMIN — IPRATROPIUM BROMIDE AND ALBUTEROL SULFATE 1 AMPULE: .5; 2.5 SOLUTION RESPIRATORY (INHALATION) at 15:34

## 2021-07-11 RX ADMIN — METHYLPREDNISOLONE SODIUM SUCCINATE 40 MG: 40 INJECTION, POWDER, FOR SOLUTION INTRAMUSCULAR; INTRAVENOUS at 22:35

## 2021-07-11 RX ADMIN — INSULIN LISPRO 2 UNITS: 100 INJECTION, SOLUTION INTRAVENOUS; SUBCUTANEOUS at 20:48

## 2021-07-11 RX ADMIN — SODIUM CHLORIDE, PRESERVATIVE FREE 10 ML: 5 INJECTION INTRAVENOUS at 08:26

## 2021-07-11 RX ADMIN — METHYLPREDNISOLONE SODIUM SUCCINATE 40 MG: 40 INJECTION, POWDER, FOR SOLUTION INTRAMUSCULAR; INTRAVENOUS at 04:31

## 2021-07-11 RX ADMIN — ALBUTEROL SULFATE 2.5 MG: 2.5 SOLUTION RESPIRATORY (INHALATION) at 23:36

## 2021-07-11 RX ADMIN — FUROSEMIDE 40 MG: 20 TABLET ORAL at 20:40

## 2021-07-11 RX ADMIN — IPRATROPIUM BROMIDE AND ALBUTEROL SULFATE 1 AMPULE: .5; 2.5 SOLUTION RESPIRATORY (INHALATION) at 07:46

## 2021-07-11 RX ADMIN — TRAMADOL HYDROCHLORIDE 50 MG: 50 TABLET, FILM COATED ORAL at 07:14

## 2021-07-11 RX ADMIN — METHYLPREDNISOLONE SODIUM SUCCINATE 40 MG: 40 INJECTION, POWDER, FOR SOLUTION INTRAMUSCULAR; INTRAVENOUS at 17:28

## 2021-07-11 RX ADMIN — PREGABALIN 50 MG: 25 CAPSULE ORAL at 14:40

## 2021-07-11 RX ADMIN — APIXABAN 5 MG: 5 TABLET, FILM COATED ORAL at 20:40

## 2021-07-11 RX ADMIN — INSULIN LISPRO 2 UNITS: 100 INJECTION, SOLUTION INTRAVENOUS; SUBCUTANEOUS at 11:59

## 2021-07-11 RX ADMIN — ALBUTEROL SULFATE 2.5 MG: 2.5 SOLUTION RESPIRATORY (INHALATION) at 05:20

## 2021-07-11 RX ADMIN — SODIUM CHLORIDE, PRESERVATIVE FREE 10 ML: 5 INJECTION INTRAVENOUS at 20:40

## 2021-07-11 RX ADMIN — ATORVASTATIN CALCIUM 40 MG: 40 TABLET, FILM COATED ORAL at 20:40

## 2021-07-11 RX ADMIN — IPRATROPIUM BROMIDE AND ALBUTEROL SULFATE 1 AMPULE: .5; 2.5 SOLUTION RESPIRATORY (INHALATION) at 12:45

## 2021-07-11 RX ADMIN — PREGABALIN 50 MG: 25 CAPSULE ORAL at 08:19

## 2021-07-11 RX ADMIN — FUROSEMIDE 40 MG: 20 TABLET ORAL at 08:19

## 2021-07-11 RX ADMIN — APIXABAN 5 MG: 5 TABLET, FILM COATED ORAL at 08:20

## 2021-07-11 RX ADMIN — PANTOPRAZOLE SODIUM 40 MG: 40 TABLET, DELAYED RELEASE ORAL at 08:26

## 2021-07-11 RX ADMIN — METHYLPREDNISOLONE SODIUM SUCCINATE 40 MG: 40 INJECTION, POWDER, FOR SOLUTION INTRAMUSCULAR; INTRAVENOUS at 10:03

## 2021-07-11 RX ADMIN — INSULIN LISPRO 2 UNITS: 100 INJECTION, SOLUTION INTRAVENOUS; SUBCUTANEOUS at 17:27

## 2021-07-11 RX ADMIN — INSULIN LISPRO 2 UNITS: 100 INJECTION, SOLUTION INTRAVENOUS; SUBCUTANEOUS at 08:19

## 2021-07-11 RX ADMIN — IPRATROPIUM BROMIDE AND ALBUTEROL SULFATE 1 AMPULE: .5; 2.5 SOLUTION RESPIRATORY (INHALATION) at 21:37

## 2021-07-11 RX ADMIN — PREGABALIN 50 MG: 25 CAPSULE ORAL at 20:40

## 2021-07-11 ASSESSMENT — PAIN DESCRIPTION - ONSET: ONSET: ON-GOING

## 2021-07-11 ASSESSMENT — PAIN DESCRIPTION - PAIN TYPE: TYPE: CHRONIC PAIN

## 2021-07-11 ASSESSMENT — ENCOUNTER SYMPTOMS
WHEEZING: 0
GASTROINTESTINAL NEGATIVE: 1
SHORTNESS OF BREATH: 1
EYES NEGATIVE: 1
COUGH: 0
CHEST TIGHTNESS: 0

## 2021-07-11 ASSESSMENT — PAIN DESCRIPTION - FREQUENCY: FREQUENCY: CONTINUOUS

## 2021-07-11 ASSESSMENT — PAIN SCALES - GENERAL
PAINLEVEL_OUTOF10: 10
PAINLEVEL_OUTOF10: 0
PAINLEVEL_OUTOF10: 0

## 2021-07-11 ASSESSMENT — PAIN DESCRIPTION - PROGRESSION: CLINICAL_PROGRESSION: NOT CHANGED

## 2021-07-11 ASSESSMENT — PAIN DESCRIPTION - DESCRIPTORS: DESCRIPTORS: ACHING

## 2021-07-11 ASSESSMENT — PAIN DESCRIPTION - LOCATION: LOCATION: LEG

## 2021-07-11 ASSESSMENT — PAIN DESCRIPTION - ORIENTATION: ORIENTATION: RIGHT;LEFT

## 2021-07-11 ASSESSMENT — PAIN - FUNCTIONAL ASSESSMENT: PAIN_FUNCTIONAL_ASSESSMENT: ACTIVITIES ARE NOT PREVENTED

## 2021-07-11 NOTE — PLAN OF CARE
RT in for scheduled aerosol therapy . Pt noted to be on 2lpm of 02 . Sat 94% . Bilateral breath sounds diminished , congested non productive cough present .  The Bipap unit on standby at the bedside

## 2021-07-11 NOTE — PROGRESS NOTES
Rt in for Aerosol administration , diminished lung fields sat 93% on 2lpm . Bipap on stand by at bedside . Rt asked pt if would like to go for a walk , declined .

## 2021-07-11 NOTE — PROGRESS NOTES
Pt placed on trial of nasal cannula O2 after albuterol and duoneb administered. Placed on 3lpm nasal cannula, spo2 97%. Pt expresses he is comfortable at this time with nasal cannula. bipap on standby at bedside, pt given call light and encouraged to let us know if he wanted to be back on the bipap.

## 2021-07-11 NOTE — PROGRESS NOTES
RT in for routine Aerosol therapy . Pt takes mask tx , no complaints of SOB . 2lpm of 02 via nasal cannula , sat 93-94% . Breath sounds diminished ., no cough .

## 2021-07-11 NOTE — PROGRESS NOTES
Pt  Arrived via squad with cpap in place. Pt expressing he does not feel he is getting enough air so he was placed on hospital bipap. Initial settings 15/5 and 50% O2 but pt once again expressing he is not getting enough air so settings increased to patient comfort. Currently 16/7.  Barrientos Pelt had been 100% since initiation of bipap so fio2 has been weaned to 25% and patient sats have remained 97% and higher.   Albuterol and duoneb aerosol administered

## 2021-07-11 NOTE — PROGRESS NOTES
Pt admitted to room 327 from ER. Oriented to room and call light/tv controls. Bed in lowest position, wheels locked, 2/4 side rails up  Call light in reach, room free of clutter, adequate lighting provided. Admission database, vital signs and assessment as charted. 0559 Pt requesting percocet for jessica chronic leg pain. NP notified. Yovany NP ordered tramadol x1 dose.

## 2021-07-11 NOTE — PLAN OF CARE
Problem: Falls - Risk of:  Goal: Will remain free from falls  Description: Will remain free from falls  7/11/2021 1936 by Gabriel Izaguirre RN  Outcome: Ongoing  7/11/2021 0544 by Nir Acuna RN  Outcome: Ongoing  Goal: Absence of physical injury  Description: Absence of physical injury  7/11/2021 1936 by Gabriel Izaguirre RN  Outcome: Ongoing  7/11/2021 0544 by Nir Acuna RN  Outcome: Ongoing     Problem: Discharge Planning:  Goal: Discharged to appropriate level of care  Description: Discharged to appropriate level of care  7/11/2021 1936 by Gabriel Izaguirre RN  Outcome: Ongoing  7/11/2021 0544 by Nir Acuna RN  Outcome: Ongoing     Problem:  Activity Intolerance:  Goal: Ability to tolerate increased activity will improve  Description: Ability to tolerate increased activity will improve  7/11/2021 1936 by Gabriel Izaguirre RN  Outcome: Ongoing  7/11/2021 0544 by Nir Acuna RN  Outcome: Ongoing     Problem: Airway Clearance - Ineffective:  Goal: Ability to maintain a clear airway will improve  Description: Ability to maintain a clear airway will improve  7/11/2021 1936 by Gabriel Izaguirre RN  Outcome: Ongoing  7/11/2021 0544 by Nir Acuna RN  Outcome: Ongoing     Problem: Breathing Pattern - Ineffective:  Goal: Ability to achieve and maintain a regular respiratory rate will improve  Description: Ability to achieve and maintain a regular respiratory rate will improve  7/11/2021 1936 by Gabriel Izaguirre RN  Outcome: Ongoing  7/11/2021 0920 by Damaris Ayala RCP  Outcome: Ongoing  7/11/2021 0544 by Nri Acuna RN  Outcome: Ongoing     Problem: Gas Exchange - Impaired:  Goal: Levels of oxygenation will improve  Description: Levels of oxygenation will improve  7/11/2021 1936 by Gabriel Izaguirre RN  Outcome: Ongoing  7/11/2021 0920 by Damaris Ayala RCP  Outcome: Ongoing  7/11/2021 0544 by Nir Acuna RN  Outcome: Ongoing

## 2021-07-11 NOTE — ED PROVIDER NOTES
[address] [facility] ED  [address] hospitals [address]  Phone: [phone]  Fax: [phone] [facility]          Pt Name: Alireza Diamond  MRN: [id number]  [place] 1965  Date of evaluation: 7/10/2021      CHIEF COMPLAINT       Chief Complaint   Patient presents with    Shortness of Breath     onset this  am. patient to er per ems, on bi-pap on arrival.       HISTORY OF PRESENT ILLNESS       Alireza Diamond is a 49 y.o. male who presents with dyspnea that began this morning. Took a breathing treatment this morning however symptoms continue to worsen throughout the day. Arrived via EMS on CPAP. Also received a breathing treatment. No steroids given prearrival and will give them now. Patient in mild respiratory distress. History primarily obtained from EMS initially. REVIEW OF SYSTEMS       Review of Systems   Constitutional: Negative for chills and fever. HENT: Negative for sore throat. Respiratory: Positive for cough and shortness of breath. Cardiovascular: Negative for chest pain. Gastrointestinal: Negative for abdominal pain, diarrhea, nausea and vomiting. Musculoskeletal: Negative for neck pain. Skin: Negative for rash. Neurological: Negative for weakness and numbness. PAST MEDICAL HISTORY    has a past medical history of COPD (chronic obstructive pulmonary disease) ([facility] and Diabetes mellitus ([facility] SURGICAL HISTORY      has no past surgical history on file.     CURRENT MEDICATIONS       Previous Medications    ALBUTEROL (PROVENTIL) (2.5 MG/3ML) 0.083% NEBULIZER SOLUTION    USE 1 VIAL IN NEBULIZER EVERY 6 HOURS AS NEEDED FOR WHEEZING FOR SHORTNESS OF BREATH    APIXABAN (ELIQUIS) 5 MG TABS TABLET    Take 1 tablet by mouth twice daily    ATORVASTATIN (LIPITOR) 40 MG TABLET    Take 1 tablet by mouth nightly    BUDESONIDE-FORMOTEROL (SYMBICORT) 160-4.5 MCG/ACT AERO    Inhale 2 puffs into the lungs 2 times daily    FUROSEMIDE (LASIX) 40 MG TABLET    Take 1 tablet by mouth 2 times daily    HYDROXYZINE (VISTARIL) 25 MG CAPSULE    TAKE 1 CAPSULE BY MOUTH EVERY 8 HOURS AS NEEDED FOR ANXIETY    PREGABALIN (LYRICA) 50 MG CAPSULE    Take 50 mg by mouth 3 times daily. TRAMADOL (ULTRAM) 50 MG TABLET    TAKE 1 TABLET BY MOUTH ONCE DAILY AS NEEDED FOR PAIN    ZOLPIDEM (AMBIEN) 10 MG TABLET    Take 10 mg by mouth nightly as needed. ALLERGIES     has No Known Allergies. FAMILY HISTORY     He indicated that his mother is . He indicated that his father is . family history includes Lung Cancer in his mother. SOCIAL HISTORY      reports that he quit smoking about 6 months ago. His smoking use included cigarettes. He has a 25.00 pack-year smoking history. He has never used smokeless tobacco. He reports previous alcohol use. He reports previous drug use. PHYSICAL EXAM     INITIAL VITALS:  blood pressure is 112/64 and his pulse is 79. His respiration is 20 and oxygen saturation is 91%. Physical Exam  Constitutional:       General: He is not in acute distress. Appearance: He is well-developed. HENT:      Head: Normocephalic and atraumatic. Right Ear: External ear normal.      Left Ear: External ear normal.   Eyes:      General: Lids are normal.         Right eye: No discharge. Left eye: No discharge. Neck:      Trachea: No tracheal deviation. Cardiovascular:      Rate and Rhythm: Normal rate and regular rhythm. Pulmonary:      Effort: Pulmonary effort is normal.      Breath sounds: Wheezing present. Abdominal:      Palpations: Abdomen is soft. Tenderness: There is no abdominal tenderness. Skin:     General: Skin is warm and dry. Neurological:      Mental Status: He is alert. GCS: GCS eye subscore is 4. GCS verbal subscore is 5. GCS motor subscore is 6.    Psychiatric:         Behavior: Behavior normal.           DIFFERENTIAL DIAGNOSIS/ MDM:     Plan to be to initiate a further cardiopulmonary work-up. DIAGNOSTIC RESULTS     EKG: All EKG's are interpreted by the Emergency Department Physician who either signs or Co-signs this chart in the absence of a cardiologist.    Interpreted by Adonis Akers MD     Rhythm: normal sinus   Rate: normal  Axis: normal  Ectopy: none  Conduction: normal  ST Segments: no acute change  T Waves: no acute change    Clinical Impression: normal sinus rhythm with no acute changes/normal EKG. No acute infarction/ischemia noted. RADIOLOGY:   Interpretation per the Radiologist below, if available at the time of this note:  XR CHEST PORTABLE   Final Result   Stable mild cardiomegaly with resolving central pulmonary congestion      Slowly resolving bibasilar atelectasis or infiltrates. No results found.     LABS:  Results for orders placed or performed during the hospital encounter of 07/10/21   Comprehensive Metabolic Panel   Result Value Ref Range    Glucose 154 (H) 70 - 99 mg/dL    BUN 15 6 - 20 mg/dL    CREATININE 1.03 0.70 - 1.20 mg/dL    Bun/Cre Ratio NOT REPORTED 9 - 20    Calcium 9.3 8.6 - 10.4 mg/dL    Sodium 141 135 - 144 mmol/L    Potassium 4.3 3.7 - 5.3 mmol/L    Chloride 105 98 - 107 mmol/L    CO2 24 20 - 31 mmol/L    Anion Gap 12 9 - 17 mmol/L    Alkaline Phosphatase 150 (H) 40 - 129 U/L    ALT 11 5 - 41 U/L    AST 12 <40 U/L    Total Bilirubin 0.39 0.3 - 1.2 mg/dL    Total Protein 6.9 6.4 - 8.3 g/dL    Albumin 3.7 3.5 - 5.2 g/dL    Albumin/Globulin Ratio 1.2 1.0 - 2.5    GFR Non-African American >60 >60 mL/min    GFR African American >60 >60 mL/min    GFR Comment          GFR Staging NOT REPORTED    CBC Auto Differential   Result Value Ref Range    WBC 8.3 3.5 - 11.0 k/uL    RBC 4.71 4.5 - 5.9 m/uL    Hemoglobin 14.1 13.5 - 17.5 g/dL    Hematocrit 43.2 41 - 53 %    MCV 91.8 80 - 100 fL    MCH 29.9 26 - 34 pg    MCHC 32.6 31 - 37 g/dL    RDW 16.0 (H) 12.5 - 15.4 %    Platelets 499 044 - 379 k/uL    MPV 8.9 6.0 - 12.0 fL    NRBC Automated NOT REPORTED per 100 WBC    Differential Type NOT REPORTED     Seg Neutrophils 59 36 - 66 %    Lymphocytes 26 24 - 44 %    Monocytes 8 2 - 11 %    Eosinophils % 6 (H) 1 - 4 %    Basophils 1 0 - 2 %    Immature Granulocytes NOT REPORTED 0 %    Segs Absolute 4.90 1.8 - 7.7 k/uL    Absolute Lymph # 2.10 1.0 - 4.8 k/uL    Absolute Mono # 0.70 0.1 - 1.2 k/uL    Absolute Eos # 0.50 (H) 0.0 - 0.4 k/uL    Basophils Absolute 0.10 0.0 - 0.2 k/uL    Absolute Immature Granulocyte NOT REPORTED 0.00 - 0.30 k/uL    WBC Morphology NOT REPORTED     RBC Morphology NOT REPORTED     Platelet Estimate NOT REPORTED    Troponin   Result Value Ref Range    Troponin, High Sensitivity 12 0 - 22 ng/L    Troponin T NOT REPORTED <0.03 ng/mL    Troponin Interp NOT REPORTED    Troponin   Result Value Ref Range    Troponin, High Sensitivity 11 0 - 22 ng/L    Troponin T NOT REPORTED <0.03 ng/mL    Troponin Interp NOT REPORTED    Lipase   Result Value Ref Range    Lipase 22 13 - 60 U/L   Lactate, Sepsis   Result Value Ref Range    Lactic Acid, Sepsis 1.6 0.5 - 1.9 mmol/L    Lactic Acid, Sepsis, Whole Blood NOT REPORTED 0.5 - 1.9 mmol/L   Lactate, Sepsis   Result Value Ref Range    Lactic Acid, Sepsis 1.3 0.5 - 1.9 mmol/L    Lactic Acid, Sepsis, Whole Blood NOT REPORTED 0.5 - 1.9 mmol/L   Protime-INR   Result Value Ref Range    Protime 10.1 9.4 - 12.6 sec    INR 1.0    APTT   Result Value Ref Range    PTT 26.2 21.3 - 31.3 sec   Brain Natriuretic Peptide   Result Value Ref Range    Pro-BNP 75 <300 pg/mL    BNP Interpretation Pro-BNP Reference Range:        EMERGENCY DEPARTMENT COURSE:     The patient was given the following medications:  Orders Placed This Encounter   Medications    ipratropium-albuterol (DUONEB) nebulizer solution 1 ampule     Order Specific Question:   Initiate RT Bronchodilator Protocol     Answer:    Yes    albuterol (PROVENTIL) nebulizer solution 2.5 mg     Order Specific Question:   Initiate RT Bronchodilator Protocol Answer: Yes    methylPREDNISolone sodium (SOLU-MEDROL) injection 125 mg        Vitals:    Vitals:    07/11/21 0015 07/11/21 0030 07/11/21 0045 07/11/21 0100   BP: 111/61 115/68 (!) 101/59 112/64   Pulse: 84 84 81 79   Resp: 22 22 22 20   SpO2: 93% 92% 91% 91%     -------------------------  BP: 112/64,  , Pulse: 79, Resp: 20      Re-evaluation Notes    Patient doing well on reevaluation. No acute changes. Still having some dyspnea. Plan to be to admit. Patient agreeable with the plan. Spoke to hospitalist who accepted admission of the patient. No further events throughout his stay in the department. Work-up unremarkable. CONSULTS:    None    CRITICAL CARE:     None    PROCEDURES:    None    FINAL IMPRESSION      1. COPD exacerbation ([address]          DISPOSITION/PLAN   DISPOSITION Admitted 07/11/2021 12:21:44 AM      Condition on Disposition    Improved    PATIENT REFERRED TO:  No follow-up provider specified.     DISCHARGE MEDICATIONS:  New Prescriptions    No medications on file       (Please note that portions of this note were completed with a voice recognition program.  Efforts were made to edit the dictations but occasionally words are mis-transcribed.)    Jess Echavarria DO, DO  Attending Emergency Physician       Jess Echavarria DO  07/11/21 [address] [facility],   07/11/21 [id number]

## 2021-07-11 NOTE — PLAN OF CARE
Problem: Falls - Risk of:  Siderails up x 2  Hourly rounding  Call light in reach  Instructed to call for assist before attempting out of bed. Remains free from falls and accidental injury at this time   Floor free from obstacles  Bed is locked and in lowest position  Adequate lighting provided  Bed alarm on, Red Falling star and Stay with Me signs posted      Goal: Will remain free from falls  Description: Will remain free from falls  Outcome: Ongoing  Goal: Absence of physical injury  Description: Absence of physical injury  Outcome: Ongoing     Problem: Discharge Planning:  Goal: Discharged to appropriate level of care  Description: Discharged to appropriate level of care  Outcome: Ongoing     Problem:  Activity Intolerance:  Goal: Ability to tolerate increased activity will improve  Description: Ability to tolerate increased activity will improve  Outcome: Ongoing     Problem: Airway Clearance - Ineffective:  Goal: Ability to maintain a clear airway will improve  Description: Ability to maintain a clear airway will improve  Outcome: Ongoing     Problem: Breathing Pattern - Ineffective:  Goal: Ability to achieve and maintain a regular respiratory rate will improve  Description: Ability to achieve and maintain a regular respiratory rate will improve  Outcome: Ongoing     Problem: Gas Exchange - Impaired:  Goal: Levels of oxygenation will improve  Description: Levels of oxygenation will improve  Outcome: Ongoing

## 2021-07-11 NOTE — H&P
[facility]  Office: [address], DO, Mane Karina, DO, Win Kellie, DO, Tan Troy Blood, DO, Sandra Parker MD, Jean Sanders MD, Edilson Lowry MD, Villa Silva MD, Asad Bello MD, Kya Elliott MD, Sudha Luong MD, Amanda Aguilar, DO, Neena Medley MD, Ludy Elliott DO, Edinson Ruiz MD,  Singh Thomas DO, Antwan Petty MD, Juan Smith MD, Alejandro Dean MD, Lydia Capps MD, Kartik Everett MD, Claudia Strong MD, Judi Hung [facility], [address], [facility], [employer], CNP, Elliot Smith, CNS, Robin Ramirez, CNP, Jo Alegria, CNP, Orlando Holliday, CNP, Maia Corral, CNP, Teri Nation, CNP, Catheryn Leventhal, PAKOBE, Harper Gaviria, [facility], Zulma Palomo, CNP, Sue Davidson, CNP, Brandon Méndez, CNP, Sharon Carter, CNP, Bartolo Puga, CNP, Bakari Joseph, CNP, Archana Dent, CNP, Last Hilda, [facility]   [address] [facility]    HISTORY AND PHYSICAL EXAMINATION            Date:   7/11/2021  Patient name:  Julienne Mendes  Date of admission:  7/10/2021 10:46 PM  MRN:   [id number]  Account:  [de-identified]  YOB: 1965  PCP:    DANIEL Yousif CNP  Room:   [facility]/327-  Code Status:    Full Code    Chief Complaint:     Chief Complaint   Patient presents with    Shortness of Breath     onset this  am. patient to er per ems, on bi-pap on arrival.       History Obtained From:     patient    History of Present Illness:     Julienne Mendes is a 49 y.o. Non-/non  male who presents with Shortness of Breath (onset this  am. patient to er per ems, on bi-pap on arrival.)   and is admitted to the hospital for the management of [facility] exacerbation ([facility] Patient reports that last night he became very short of breath. He says he was wearing his CPAP. He says he does have an appointment with a lung doctor, but he is not sure as his sister keeps track of his appointments.   He denies any sick contacts, smoking, chemical exposure. He says that he is compliant with his CPAP and wears it at night and when he is sleeping and sometimes during the day when he feels short of breath. He complained of feeling short of breath at rest, and the dyspnea became worse when he was moving around. While in the ED, chest x-ray revealed stable mild cardiomegaly with resolving central pulmonary congestion, slowly resolving bibasilar atelectasis/infiltrates. He was admitted to the inpatient nursing unit for observation and management of COPD exacerbation. Past Medical History:     Past Medical History:   Diagnosis Date    [facility]chronic obstructive pulmonary disease) ([facility]     [facility]     ROSEY on CPAP         Past Surgical History:     History reviewed. No pertinent surgical history. Medications Prior to Admission:     Prior to Admission medications    Medication Sig Start Date End Date Taking? Authorizing Provider   oxyCODONE-acetaminophen (PERCOCET) 5-325 MG per tablet Take 1 tablet by mouth every 8 hours as needed. 6/2/21   Historical Provider, MD   atorvastatin (LIPITOR) 40 MG tablet Take 1 tablet by mouth nightly 6/12/21   DANIEL Domínguez NP   furosemide (LASIX) 40 MG tablet Take 1 tablet by mouth 2 times daily 6/12/21   DANIEL Domínguez NP   albuterol (PROVENTIL) (2.5 MG/3ML) 0.083% nebulizer solution USE 1 VIAL IN NEBULIZER EVERY 6 HOURS AS NEEDED FOR WHEEZING FOR SHORTNESS OF BREATH 2/22/21   Historical Provider, MD   apixaban (ELIQUIS) 5 MG TABS tablet Take 1 tablet by mouth twice daily 3/29/21   Historical Provider, MD   budesonide-formoterol (SYMBICORT) 160-4.5 MCG/ACT AERO Inhale 2 puffs into the lungs 2 times daily 10/8/20   Historical Provider, MD   hydrOXYzine (VISTARIL) 25 MG capsule TAKE 1 CAPSULE BY MOUTH EVERY 8 HOURS AS NEEDED FOR ANXIETY 5/6/21   Historical Provider, MD   pregabalin (LYRICA) 50 MG capsule Take 50 mg by mouth 3 times daily.  4/27/21   Historical Provider, MD traMADol (ULTRAM) 50 MG tablet TAKE 1 TABLET BY MOUTH ONCE DAILY AS NEEDED FOR PAIN 21   Historical Provider, MD   zolpidem (AMBIEN) 10 MG tablet Take 10 mg by mouth nightly as needed. 21   Historical Provider, MD        Allergies:     Patient has no known allergies. Social History:     Tobacco:    reports that he quit smoking about 6 months ago. His smoking use included cigarettes. He has a 25.00 pack-year smoking history. He has never used smokeless tobacco.  Alcohol:      reports previous alcohol use. Drug Use:  reports previous drug use. Family History:     Family History   Problem Relation Age of Onset    Lung Cancer Mother        Review of Systems:     Positive and Negative as described in HPI. Review of Systems   Constitutional: Negative. HENT: Negative. Eyes: Negative. Respiratory: Positive for shortness of breath. Negative for cough, chest tightness and wheezing. Cardiovascular: Negative. Gastrointestinal: Negative. Genitourinary: Negative. Musculoskeletal: Negative. Skin: Negative. Neurological: Negative. Psychiatric/Behavioral: Negative. Physical Exam:   /68   Pulse 83   Temp 98 °F (36.7 °C) (Oral)   Resp 16   Ht 6' 3\" (1.905 m)   Wt (!) 362 lb 9.6 oz (164.5 kg)   SpO2 94%   BMI 45.32 kg/m²   Temp (24hrs), Av.3 °F (36.8 °C), Min:98 °F (36.7 °C), Max:98.6 °F (37 °C)    Recent Labs     21  0744 21  1144 21  1633   POCGLU 203* 227* 227*       Intake/Output Summary (Last 24 hours) at 2021  Last data filed at 2021 1911  Gross per 24 hour   Intake --   Output 1775 ml   Net -1775 ml       Physical Exam  Vitals and nursing note reviewed. Constitutional:       General: He is not in acute distress. Appearance: He is ill-appearing. He is not toxic-appearing or diaphoretic. HENT:      Head: Normocephalic and atraumatic.       Right Ear: External ear normal.      Left Ear: External ear normal. Nose: Nose normal. No rhinorrhea. Mouth/Throat:      Mouth: Mucous membranes are moist.   Eyes:      General: No scleral icterus. Right eye: No discharge. Left eye: No discharge. Extraocular Movements: Extraocular movements intact. Conjunctiva/sclera: Conjunctivae normal.      Pupils: Pupils are equal, round, and reactive to light. Cardiovascular:      Rate and Rhythm: Normal rate and regular rhythm. Pulses: Normal pulses. Heart sounds: Normal heart sounds. No murmur heard. No friction rub. No gallop. Pulmonary:      Effort: Pulmonary effort is normal. No respiratory distress. Breath sounds: No wheezing, rhonchi or rales. Comments: Diminished breath sounds throughout    Supplemental O2 in place  Abdominal:      General: Bowel sounds are normal. There is no distension. Palpations: Abdomen is soft. Tenderness: There is no abdominal tenderness. There is no guarding. Hernia: No hernia is present. Musculoskeletal:         General: Tenderness present. Cervical back: Normal range of motion and neck supple. Right lower leg: Edema present. Left lower leg: Edema present. Comments: Chronic pain to bilateral feet   Skin:     General: Skin is warm and dry. Coloration: Skin is not jaundiced. Findings: No bruising, erythema or lesion. Neurological:      General: No focal deficit present. Mental Status: He is alert and oriented to person, place, and time. Psychiatric:         Mood and Affect: Mood normal.         Behavior: Behavior normal.         Thought Content:  Thought content normal.         Judgment: Judgment normal.         Investigations:      Laboratory Testing:  Recent Results (from the past 24 hour(s))   Comprehensive Metabolic Panel    Collection Time: 07/10/21 11:00 PM   Result Value Ref Range    Glucose 154 (H) 70 - 99 mg/dL    BUN 15 6 - 20 mg/dL    CREATININE 1.03 0.70 - 1.20 mg/dL    Bun/Cre Ratio NOT REPORTED 9 - 20    Calcium 9.3 8.6 - 10.4 mg/dL    Sodium 141 135 - 144 mmol/L    Potassium 4.3 3.7 - 5.3 mmol/L    Chloride 105 98 - 107 mmol/L    CO2 24 20 - 31 mmol/L    Anion Gap 12 9 - 17 mmol/L    Alkaline Phosphatase 150 (H) 40 - 129 U/L    ALT 11 5 - 41 U/L    AST 12 <40 U/L    Total Bilirubin 0.39 0.3 - 1.2 mg/dL    Total Protein 6.9 6.4 - 8.3 g/dL    Albumin 3.7 3.5 - 5.2 g/dL    Albumin/Globulin Ratio 1.2 1.0 - 2.5    GFR Non-African American >60 >60 mL/min    GFR African American >60 >60 mL/min    GFR Comment          GFR Staging NOT REPORTED    CBC Auto Differential    Collection Time: 07/10/21 11:00 PM   Result Value Ref Range    WBC 8.3 3.5 - 11.0 k/uL    RBC 4.71 4.5 - 5.9 m/uL    Hemoglobin 14.1 13.5 - 17.5 g/dL    Hematocrit 43.2 41 - 53 %    MCV 91.8 80 - 100 fL    MCH 29.9 26 - 34 pg    MCHC 32.6 31 - 37 g/dL    RDW 16.0 (H) 12.5 - 15.4 %    Platelets 889 476 - 069 k/uL    MPV 8.9 6.0 - 12.0 fL    NRBC Automated NOT REPORTED per 100 WBC    Differential Type NOT REPORTED     Seg Neutrophils 59 36 - 66 %    Lymphocytes 26 24 - 44 %    Monocytes 8 2 - 11 %    Eosinophils % 6 (H) 1 - 4 %    Basophils 1 0 - 2 %    Immature Granulocytes NOT REPORTED 0 %    Segs Absolute 4.90 1.8 - 7.7 k/uL    Absolute Lymph # 2.10 1.0 - 4.8 k/uL    Absolute Mono # 0.70 0.1 - 1.2 k/uL    Absolute Eos # 0.50 (H) 0.0 - 0.4 k/uL    Basophils Absolute 0.10 0.0 - 0.2 k/uL    Absolute Immature Granulocyte NOT REPORTED 0.00 - 0.30 k/uL    WBC Morphology NOT REPORTED     RBC Morphology NOT REPORTED     Platelet Estimate NOT REPORTED    Troponin    Collection Time: 07/10/21 11:00 PM   Result Value Ref Range    Troponin, High Sensitivity 12 0 - 22 ng/L    Troponin T NOT REPORTED <0.03 ng/mL    Troponin Interp NOT REPORTED    Lipase    Collection Time: 07/10/21 11:00 PM   Result Value Ref Range    Lipase 22 13 - 60 U/L   Lactate, Sepsis    Collection Time: 07/10/21 11:00 PM   Result Value Ref Range    Lactic Acid, Sepsis 1.6 0.5 - 1.9 mmol/L    Lactic Acid, Sepsis, Whole Blood NOT REPORTED 0.5 - 1.9 mmol/L   Protime-INR    Collection Time: 07/10/21 11:00 PM   Result Value Ref Range    Protime 10.1 9.4 - 12.6 sec    INR 1.0    APTT    Collection Time: 07/10/21 11:00 PM   Result Value Ref Range    PTT 26.2 21.3 - 31.3 sec   Brain Natriuretic Peptide    Collection Time: 07/10/21 11:00 PM   Result Value Ref Range    Pro-BNP 75 <300 pg/mL    BNP Interpretation Pro-BNP Reference Range:    Hemoglobin A1C    Collection Time: 07/10/21 11:00 PM   Result Value Ref Range    Hemoglobin A1C 6.2 (H) 4.0 - 6.0 %    Estimated Avg Glucose 131 mg/dL   Troponin    Collection Time: 07/11/21 12:42 AM   Result Value Ref Range    Troponin, High Sensitivity 11 0 - 22 ng/L    Troponin T NOT REPORTED <0.03 ng/mL    Troponin Interp NOT REPORTED    Lactate, Sepsis    Collection Time: 07/11/21 12:42 AM   Result Value Ref Range    Lactic Acid, Sepsis 1.3 0.5 - 1.9 mmol/L    Lactic Acid, Sepsis, Whole Blood NOT REPORTED 0.5 - 1.9 mmol/L   POC Glucose Fingerstick    Collection Time: 07/11/21  7:44 AM   Result Value Ref Range    POC Glucose 203 (H) 75 - 110 mg/dL   POC Glucose Fingerstick    Collection Time: 07/11/21 11:44 AM   Result Value Ref Range    POC Glucose 227 (H) 75 - 110 mg/dL   POC Glucose Fingerstick    Collection Time: 07/11/21  4:33 PM   Result Value Ref Range    POC Glucose 227 (H) 75 - 110 mg/dL       Imaging/Diagnostics:    XR CHEST PORTABLE    Result Date: 7/10/2021  Stable mild cardiomegaly with resolving central pulmonary congestion Slowly resolving bibasilar atelectasis or infiltrates.        Assessment :      Hospital Problems         Last Modified POA    * (Principal) [facility] 7/11/2021 Yes    Diabetes mellitus type 2, controlled ([facility] 7/11/2021 Yes    GERD (gastroesophageal reflux disease) 7/11/2021 Yes    Sleep apnea 7/11/2021 Yes    COPD (chronic obstructive pulmonary disease) ([facility] 7/11/2021 Yes    Anxiety 7/11/2021 Yes    Long term (current) use of anticoagulants 7/11/2021 Yes          Plan:     Patient status inpatient in the Med/Surge    1. COPD exacerbation: Supplemental O2 as needed to keep SPO2 greater than 90%. Steroid as ordered. Breathing treatments as ordered. 2. Diabetes: Check blood glucose before meals and at bedtime. Antidiabetic medications as ordered  3. GERD: Protonix daily  4. COPD: See #1.  5. Sleep apnea: CPAP at night and with naps. Nocturnal pulse ox study tonight to determine if patient requires O2 feed for home CPAP. 6. Anticoagulation for history of clots: Continue Eliquis  7. Anxiety: Hydroxyzine as needed. Consultations:   None     Patient is admitted as inpatient status because of co-morbidities listed above, severity of signs and symptoms as outlined, requirement for current medical therapies and most importantly because of direct risk to patient if care not provided in a hospital setting. Expected length of stay > 48 hours.     DANIEL Friend NP  7/11/2021  8:06 PM    Copy sent to DANIEL Shah - CNP

## 2021-07-12 LAB
ANION GAP SERPL CALCULATED.3IONS-SCNC: 9 MMOL/L (ref 9–17)
BUN BLDV-MCNC: 21 MG/DL (ref 6–20)
BUN/CREAT BLD: ABNORMAL (ref 9–20)
CALCIUM SERPL-MCNC: 9.1 MG/DL (ref 8.6–10.4)
CHLORIDE BLD-SCNC: 105 MMOL/L (ref 98–107)
CO2: 23 MMOL/L (ref 20–31)
CREAT SERPL-MCNC: 0.85 MG/DL (ref 0.7–1.2)
EKG ATRIAL RATE: 83 BPM
EKG P AXIS: 57 DEGREES
EKG P-R INTERVAL: 176 MS
EKG Q-T INTERVAL: 370 MS
EKG QRS DURATION: 96 MS
EKG QTC CALCULATION (BAZETT): 434 MS
EKG R AXIS: -15 DEGREES
EKG T AXIS: 61 DEGREES
EKG VENTRICULAR RATE: 83 BPM
GFR AFRICAN AMERICAN: >60 ML/MIN
GFR NON-AFRICAN AMERICAN: >60 ML/MIN
GFR SERPL CREATININE-BSD FRML MDRD: ABNORMAL ML/MIN/{1.73_M2}
GFR SERPL CREATININE-BSD FRML MDRD: ABNORMAL ML/MIN/{1.73_M2}
GLUCOSE BLD-MCNC: 229 MG/DL (ref 75–110)
GLUCOSE BLD-MCNC: 252 MG/DL (ref 70–99)
GLUCOSE BLD-MCNC: 256 MG/DL (ref 75–110)
GLUCOSE BLD-MCNC: 318 MG/DL (ref 75–110)
GLUCOSE BLD-MCNC: 334 MG/DL (ref 75–110)
HCT VFR BLD CALC: 43.1 % (ref 41–53)
HEMOGLOBIN: 13.7 G/DL (ref 13.5–17.5)
MCH RBC QN AUTO: 29.8 PG (ref 26–34)
MCHC RBC AUTO-ENTMCNC: 31.8 G/DL (ref 31–37)
MCV RBC AUTO: 93.6 FL (ref 80–100)
NRBC AUTOMATED: ABNORMAL PER 100 WBC
PDW BLD-RTO: 15.8 % (ref 12.5–15.4)
PLATELET # BLD: 216 K/UL (ref 140–450)
PMV BLD AUTO: 9.2 FL (ref 6–12)
POTASSIUM SERPL-SCNC: 4.5 MMOL/L (ref 3.7–5.3)
RBC # BLD: 4.61 M/UL (ref 4.5–5.9)
SODIUM BLD-SCNC: 137 MMOL/L (ref 135–144)
WBC # BLD: 13 K/UL (ref 3.5–11)

## 2021-07-12 PROCEDURE — 94761 N-INVAS EAR/PLS OXIMETRY MLT: CPT

## 2021-07-12 PROCEDURE — 99232 SBSQ HOSP IP/OBS MODERATE 35: CPT | Performed by: INTERNAL MEDICINE

## 2021-07-12 PROCEDURE — 6360000002 HC RX W HCPCS: Performed by: NURSE PRACTITIONER

## 2021-07-12 PROCEDURE — 97166 OT EVAL MOD COMPLEX 45 MIN: CPT

## 2021-07-12 PROCEDURE — 6370000000 HC RX 637 (ALT 250 FOR IP): Performed by: NURSE PRACTITIONER

## 2021-07-12 PROCEDURE — 97116 GAIT TRAINING THERAPY: CPT

## 2021-07-12 PROCEDURE — 36415 COLL VENOUS BLD VENIPUNCTURE: CPT

## 2021-07-12 PROCEDURE — 94660 CPAP INITIATION&MGMT: CPT

## 2021-07-12 PROCEDURE — 80048 BASIC METABOLIC PNL TOTAL CA: CPT

## 2021-07-12 PROCEDURE — 85027 COMPLETE CBC AUTOMATED: CPT

## 2021-07-12 PROCEDURE — 97535 SELF CARE MNGMENT TRAINING: CPT

## 2021-07-12 PROCEDURE — 2060000000 HC ICU INTERMEDIATE R&B

## 2021-07-12 PROCEDURE — 99254 IP/OBS CNSLTJ NEW/EST MOD 60: CPT | Performed by: INTERNAL MEDICINE

## 2021-07-12 PROCEDURE — 2580000003 HC RX 258: Performed by: NURSE PRACTITIONER

## 2021-07-12 PROCEDURE — 2700000000 HC OXYGEN THERAPY PER DAY

## 2021-07-12 PROCEDURE — 97162 PT EVAL MOD COMPLEX 30 MIN: CPT

## 2021-07-12 PROCEDURE — 82947 ASSAY GLUCOSE BLOOD QUANT: CPT

## 2021-07-12 PROCEDURE — 94640 AIRWAY INHALATION TREATMENT: CPT

## 2021-07-12 RX ADMIN — PREGABALIN 50 MG: 25 CAPSULE ORAL at 14:47

## 2021-07-12 RX ADMIN — METHYLPREDNISOLONE SODIUM SUCCINATE 40 MG: 40 INJECTION, POWDER, FOR SOLUTION INTRAMUSCULAR; INTRAVENOUS at 04:57

## 2021-07-12 RX ADMIN — IPRATROPIUM BROMIDE AND ALBUTEROL SULFATE 1 AMPULE: .5; 2.5 SOLUTION RESPIRATORY (INHALATION) at 19:54

## 2021-07-12 RX ADMIN — IPRATROPIUM BROMIDE AND ALBUTEROL SULFATE 1 AMPULE: .5; 2.5 SOLUTION RESPIRATORY (INHALATION) at 12:52

## 2021-07-12 RX ADMIN — METHYLPREDNISOLONE SODIUM SUCCINATE 40 MG: 40 INJECTION, POWDER, FOR SOLUTION INTRAMUSCULAR; INTRAVENOUS at 10:50

## 2021-07-12 RX ADMIN — IPRATROPIUM BROMIDE AND ALBUTEROL SULFATE 1 AMPULE: .5; 2.5 SOLUTION RESPIRATORY (INHALATION) at 16:18

## 2021-07-12 RX ADMIN — SODIUM CHLORIDE, PRESERVATIVE FREE 10 ML: 5 INJECTION INTRAVENOUS at 21:07

## 2021-07-12 RX ADMIN — METHYLPREDNISOLONE SODIUM SUCCINATE 40 MG: 40 INJECTION, POWDER, FOR SOLUTION INTRAMUSCULAR; INTRAVENOUS at 21:06

## 2021-07-12 RX ADMIN — IPRATROPIUM BROMIDE AND ALBUTEROL SULFATE 1 AMPULE: .5; 2.5 SOLUTION RESPIRATORY (INHALATION) at 09:06

## 2021-07-12 RX ADMIN — SODIUM CHLORIDE, PRESERVATIVE FREE 10 ML: 5 INJECTION INTRAVENOUS at 10:50

## 2021-07-12 RX ADMIN — METHYLPREDNISOLONE SODIUM SUCCINATE 40 MG: 40 INJECTION, POWDER, FOR SOLUTION INTRAMUSCULAR; INTRAVENOUS at 17:15

## 2021-07-12 RX ADMIN — INSULIN LISPRO 3 UNITS: 100 INJECTION, SOLUTION INTRAVENOUS; SUBCUTANEOUS at 09:00

## 2021-07-12 RX ADMIN — INSULIN LISPRO 4 UNITS: 100 INJECTION, SOLUTION INTRAVENOUS; SUBCUTANEOUS at 21:05

## 2021-07-12 RX ADMIN — PREGABALIN 50 MG: 25 CAPSULE ORAL at 21:06

## 2021-07-12 RX ADMIN — PANTOPRAZOLE SODIUM 40 MG: 40 TABLET, DELAYED RELEASE ORAL at 05:58

## 2021-07-12 RX ADMIN — FUROSEMIDE 40 MG: 20 TABLET ORAL at 08:37

## 2021-07-12 RX ADMIN — PREGABALIN 50 MG: 25 CAPSULE ORAL at 08:37

## 2021-07-12 RX ADMIN — INSULIN LISPRO 8 UNITS: 100 INJECTION, SOLUTION INTRAVENOUS; SUBCUTANEOUS at 17:15

## 2021-07-12 RX ADMIN — APIXABAN 5 MG: 5 TABLET, FILM COATED ORAL at 08:38

## 2021-07-12 RX ADMIN — APIXABAN 5 MG: 5 TABLET, FILM COATED ORAL at 21:06

## 2021-07-12 RX ADMIN — INSULIN LISPRO 4 UNITS: 100 INJECTION, SOLUTION INTRAVENOUS; SUBCUTANEOUS at 12:01

## 2021-07-12 RX ADMIN — FUROSEMIDE 40 MG: 20 TABLET ORAL at 21:06

## 2021-07-12 RX ADMIN — ATORVASTATIN CALCIUM 40 MG: 40 TABLET, FILM COATED ORAL at 21:06

## 2021-07-12 ASSESSMENT — PAIN DESCRIPTION - ONSET
ONSET: ON-GOING
ONSET: ON-GOING

## 2021-07-12 ASSESSMENT — PAIN DESCRIPTION - FREQUENCY
FREQUENCY: CONTINUOUS

## 2021-07-12 ASSESSMENT — PAIN SCALES - GENERAL
PAINLEVEL_OUTOF10: 10

## 2021-07-12 ASSESSMENT — PAIN DESCRIPTION - LOCATION
LOCATION: LEG
LOCATION: LEG

## 2021-07-12 ASSESSMENT — PAIN DESCRIPTION - PROGRESSION
CLINICAL_PROGRESSION: NOT CHANGED
CLINICAL_PROGRESSION: NOT CHANGED

## 2021-07-12 ASSESSMENT — PAIN DESCRIPTION - PAIN TYPE
TYPE: CHRONIC PAIN
TYPE: CHRONIC PAIN

## 2021-07-12 ASSESSMENT — PAIN - FUNCTIONAL ASSESSMENT
PAIN_FUNCTIONAL_ASSESSMENT: ACTIVITIES ARE NOT PREVENTED
PAIN_FUNCTIONAL_ASSESSMENT: PREVENTS OR INTERFERES SOME ACTIVE ACTIVITIES AND ADLS
PAIN_FUNCTIONAL_ASSESSMENT: PREVENTS OR INTERFERES SOME ACTIVE ACTIVITIES AND ADLS

## 2021-07-12 ASSESSMENT — PAIN DESCRIPTION - ORIENTATION
ORIENTATION: RIGHT;LEFT
ORIENTATION: RIGHT;LEFT

## 2021-07-12 ASSESSMENT — PAIN DESCRIPTION - DESCRIPTORS
DESCRIPTORS: ACHING;THROBBING
DESCRIPTORS: ACHING

## 2021-07-12 NOTE — CARE COORDINATION
Case Management Initial Discharge Plan  Manny Ramirez,             Met with:patient to discuss discharge plans. Information verified: address, contacts, phone number, , insurance Yes  Insurance Provider: [facility]    Emergency Contact/Next of Kin name & number: Georgia Dillon [phone]  Who are involved in patient's support system? family    PCP: DANIEL Velasquez CNP  Date of last visit: 2021 per Epic      Discharge Planning    Living Arrangements:  Family Members     Home has 1 stories  2 stairs to climb to get into front door  Location of bedroom/bathroom in home 1    Patient able to perform ADL's:Independent    Current Services (outpatient & in home) none  DME equipment: walker, cane, Cpap  DME provider: [address] [place] [address]    Is patient receiving oral anticoagulation therapy? Yes    If indicated:   Physician managing anticoagulation treatment: PCP  Where does patient obtain lab work for ATC treatment? n/a      Potential Assistance Needed:  N/A    Patient agreeable to home care: No  [place] of choice provided:  no    Prior SNF/Rehab Placement and Facility: no  Agreeable to SNF/Rehab: No  [place] of choice provided: no     Evaluation: no    Expected Discharge date:  21    Patient expects to be discharged to:  home    If home: is the family and/or caregiver wiling & able to provide support at home? yes  Who will be providing this support? sister    Follow Up Appointment: Best Day/ Time:      Transportation provider:   Transportation arrangements needed for discharge: No    Readmission Risk              Risk of Unplanned Readmission:  18             Does patient have a readmission risk score greater than 14?: Yes  If yes, follow-up appointment must be made within 7 days of discharge.      Goals of Care: breathe better      Educated patient on transitional options, provided freedom of choice and are agreeable with plan      Discharge Plan: home with sister, independnet          Electronically signed by Elin Moreira RN on 7/12/21 at 9:24 AM EDT

## 2021-07-12 NOTE — PLAN OF CARE
Problem: Falls - Risk of:  Goal: Will remain free from falls  Description: Will remain free from falls  7/12/2021 0148 by Brandi Hall RN  Outcome: Ongoing   Pt assessed as a fall risk this shift. Remains free from falls and accidental injury at this time. Fall precautions in place, including falling star sign and fall risk band on pt. Floor free from obstacles, and bed is locked and in lowest position. Adequate lighting provided. Pt encouraged to call before getting OOB for any need. Bed alarm activated. Will continue to monitor needs during hourly rounding, and reinforce education on use of call light. Problem: Discharge Planning:  Goal: Discharged to appropriate level of care  Description: Discharged to appropriate level of care  7/12/2021 0148 by Brandi Hall RN  Outcome: Ongoing     Problem: Activity Intolerance:  Goal: Ability to tolerate increased activity will improve  Description: Ability to tolerate increased activity will improve  7/12/2021 0148 by Brandi Hall RN  Outcome: Ongoing     Problem: Airway Clearance - Ineffective:  Goal: Ability to maintain a clear airway will improve  Description: Ability to maintain a clear airway will improve  7/12/2021 0148 by Brandi Hall RN  Outcome: Ongoing   Patient able to maintain a clear airway this shift. Problem: Breathing Pattern - Ineffective:  Goal: Ability to achieve and maintain a regular respiratory rate will improve  Description: Ability to achieve and maintain a regular respiratory rate will improve  7/12/2021 0148 by Brandi Hall RN  Outcome: Ongoing     Problem: Gas Exchange - Impaired:  Goal: Levels of oxygenation will improve  Description: Levels of oxygenation will improve  7/12/2021 0148 by Brandi Hall RN  Outcome: Ongoing   Patient on 2L NC. Will continue to monitor.

## 2021-07-12 NOTE — PROGRESS NOTES
[facility]  Office: [address], DO, Fina Galan, DO, Tennille Baker, DO, Sparkle Topete, DO, Wayne Delgado MD, Royal Soares MD, Sheila Johnson MD, Nyasia López MD, Mukesh Anglin MD, Carlton Aguilar MD, Dea Coyle MD, Louisa Mark DO, Yvonne Casanova MD, Alix Devries DO, Dennis Mendes MD,  Syed Zarco DO, Samanta Haynes MD, Alli Landrum MD, Hunter Nunes MD, Magnus Cruz MD, Josh Veloz MD, Sofia Landis MD, Haider Flores, [facility], [facility], CNP, Vadim Nelson, CNP, Bryce Chau, CNS, Jessika Ernst, CNP, Claude Leep, CNP, Chantel Nagy, CNP, Lisha Roblero, CNP, Prisca Muro, CNP, Calin Duggan PA-C, Sree Mckinley, [facility], Deb Delgado, CNP, Melody Monteiro, CNP, Geovanna Kim, CNP, Jude Hutchinson, CNP, Diana Holder, CNP, Beatrice Mercado, CNP, Surjit Rhodes, CNP, [address]    Progress Note    7/12/2021    11:06 AM    Name:   Jose Del Valle  MRN:     [id number]     Acct:      [de-identified]   Room:   [facility] Day:  1  Admit Date:  7/10/2021 10:46 PM    PCP:   DANIEL Castro CNP  Code Status:  Full Code    Subjective:     C/C: Shortness of breath  Chief Complaint   Patient presents with    Shortness of Breath     onset this  am. patient to er per ems, on bi-pap on arrival.     Interval History Status: improved. Patient resting in chair. Supplemental O2 in place. He reports his shortness of breath is only slightly better. No complaints of chest pain, abdominal pain, nausea, vomiting. He has chronic pain and swelling in bilateral lower extremities. No complaints of fever, chills, or headache. No issues reported overnight per nursing. He is afebrile. Brief History:     Per previous documentation:      Jose Del Valle is a 49 y.o.  Non-/non  male who presents with Shortness of Breath (onset this  am. patient to er per ems, on bi-pap on arrival.)   and is admitted to the hospital for the management of COPD exacerbation ([facility] [address]    Patient reports that last night he became very short of breath. He says he was wearing his CPAP. He says he does have an appointment with a lung doctor, but he is not sure as his sister keeps track of his appointments. He denies any sick contacts, smoking, chemical exposure. He says that he is compliant with his CPAP and wears it at night and when he is sleeping and sometimes during the day when he feels short of breath. He complained of feeling short of breath at rest, and the dyspnea became worse when he was moving around.      While in the ED, chest x-ray revealed stable mild cardiomegaly with resolving central pulmonary congestion, slowly resolving bibasilar atelectasis/infiltrates. He was admitted to the inpatient nursing unit for observation and management of COPD exacerbation. Pulmonary was consulted due to repeated hospitalizations for COPD exacerbations. Pulmonary evaluated. We will continue current interventions with respiratory treatments, supplemental O2, BiPAP as needed, and steroid. Review of Systems:     Constitutional:  negative for chills, fevers, sweats  Respiratory:  negative for cough, dyspnea on exertion,  Wheezing, + shortness of breath  Cardiovascular:  negative for chest pain, chest pressure/discomfort, + lower extremity edema, no palpitations  Gastrointestinal:  negative for abdominal pain, constipation, diarrhea, nausea, vomiting  Neurological:  negative for dizziness, headache    Medications:      Allergies:  No Known Allergies    Current Meds:   Scheduled Meds:    apixaban  5 mg Oral BID    atorvastatin  40 mg Oral Nightly    furosemide  40 mg Oral BID    pregabalin  50 mg Oral TID    sodium chloride flush  5-40 mL Intravenous 2 times per day    ipratropium-albuterol  1 ampule Inhalation Q4H WA    methylPREDNISolone  40 mg Intravenous Q6H    Followed by   Veronica Merritt ON 7/13/2021] ANIONGAP 12  --  9   LABGLOM >60  --  >60   GFRAA >60  --  >60   CALCIUM 9.3  --  9.1   PROBNP 75  --   --    TROPHS 12 11  --      Recent Labs     07/10/21  2300 07/11/21  0744 07/11/21  1144 07/11/21  1633 07/11/21  2037 07/12/21  0838   PROT 6.9  --   --   --   --   --    LABALBU 3.7  --   --   --   --   --    LABA1C 6.2*  --   --   --   --   --    AST 12  --   --   --   --   --    ALT 11  --   --   --   --   --    ALKPHOS 150*  --   --   --   --   --    BILITOT 0.39  --   --   --   --   --    LIPASE 22  --   --   --   --   --    POCGLU  --  203* 227* 227* 273* 256*       Lab Results   Component Value Date/Time    SPECIAL NOT REPORTED 06/10/2021 01:48 PM     Lab Results   Component Value Date/Time    CULTURE NORMAL RESPIRATORY CARLOS ALBERTO HEAVY GROWTH 06/10/2021 01:48 PM       Radiology:  XR CHEST PORTABLE    Result Date: 7/10/2021  Stable mild cardiomegaly with resolving central pulmonary congestion Slowly resolving bibasilar atelectasis or infiltrates. Physical Examination:       General appearance:  alert, cooperative and no distress  Mental Status:  oriented to person, place and time and blunt affect  Lungs: Diminished throughout all lung fields, normal effort  Heart:  regular rate and rhythm, no murmur  Abdomen:  soft, nontender, nondistended, normal bowel sounds, no masses, hepatomegaly, splenomegaly  Extremities: 2+ BLE edema, no redness, tenderness in the calves, chronic vascular discoloration  Skin:  no gross lesions, rashes, induration    Assessment:     Hospital Problems         Last Modified POA    * (Principal) COPD exacerbation ([facility] [address] 7/11/2021 Yes    Diabetes mellitus type 2, controlled ([facility] [address] 7/11/2021 Yes    GERD (gastroesophageal reflux disease) 7/11/2021 Yes    Sleep apnea 7/11/2021 Yes    COPD (chronic obstructive pulmonary disease) ([facility] [address] 7/11/2021 Yes    Anxiety 7/11/2021 Yes    Long term (current) use of anticoagulants 7/11/2021 Yes          Plan:     1.  COPD exacerbation: Supplemental O2 as needed to keep SPO2 greater than 90%. Steroid as ordered. Breathing treatments as ordered. Pulmonary consulted and is following. Appreciate input. 2. Diabetes: Check blood glucose before meals and at bedtime. Antidiabetic medications as ordered  3. GERD: Protonix daily  4. COPD: See #1.  5. Sleep apnea: CPAP at night and with naps. 6. Anticoagulation for history of clots: Continue Eliquis  7. Anxiety: Hydroxyzine as needed.     Nam Lord, DANIEL - NP  7/12/2021  11:06 AM

## 2021-07-12 NOTE — PROGRESS NOTES
Occupational Therapy   Occupational Therapy Initial Assessment  Date: 2021   Patient Name: Pepe Zavala  MRN: [id number]     : 1965    Date of Service: 2021    Discharge Recommendations:  Patient would benefit from continued therapy after discharge     OT Equipment Recommendations  Equipment Needed: Yes  Mobility Devices: Thalia Kj: Rolling    Assessment   Performance deficits / Impairments: Decreased functional mobility ; Decreased safe awareness;Decreased balance;Decreased ADL status; Decreased endurance;Decreased fine motor control  Assessment: pt would continue to benefit from skilled OT services during hospitalization and upon discharge to maximize pt's improvement in above stated deficits. Pt expectant for safe return to prior living arrangement with support from family and initial therapy services to promote activity tolerance and independence to perform ADLs and functional transfers/mobility. Prognosis: Good;Fair  Decision Making: Medium Complexity  OT Education: OT Role;Plan of Care;Energy Conservation; ADL Adaptive Strategies; Equipment;Precautions  Patient Education: pursed lip breathing  REQUIRES OT FOLLOW UP: Yes  Activity Tolerance  Activity Tolerance: Patient limited by fatigue  Activity Tolerance: d/t COPD exacerbation  Safety Devices  Safety Devices in place: Yes  Type of devices: Call light within reach; Chair alarm in place; Left in chair;Nurse notified  Restraints  Initially in place: No           Patient Diagnosis(es): The encounter diagnosis was COPD exacerbation ([facility] [address] has a past medical history of COPD (chronic obstructive pulmonary disease) ([facility] [address] Diabetes mellitus ([facility] [address] and ROSEY on CPAP. has no past surgical history on file.            Restrictions  Restrictions/Precautions  Restrictions/Precautions: Fall Risk  Required Braces or Orthoses?: No  Position Activity Restriction  Other position/activity restrictions: Up as tolerate - 2L O2 PRN    Subjective General  Patient assessed for rehabilitation services?: Yes  Family / Caregiver Present: No  General Comment  Comments: RN ok'd for rohit hicks PM. Pt pleasant and engaged throughout. Patient Currently in Pain: Yes  Pain Assessment  Pain Assessment: 0-10  Pain Level: 10  Pain Type: Chronic pain  Pain Location: Leg  Pain Orientation: Right;Left  Pain Descriptors: Aching  Pain Frequency: Continuous  Pain Onset: On-going  Clinical Progression: Not changed  Functional Pain Assessment: Prevents or interferes some active activities and ADLs  Non-Pharmaceutical Pain Intervention(s): Ambulation/Increased Activity;Repositioned  Response to Pain Intervention: Patient Satisfied  Vital Signs  Patient Currently in Pain: Yes       Social/Functional History  Social/Functional History  Lives With: Family (sister and niece)  Type of Home: House  Home Layout: One level  Home Access: Stairs to enter without rails  Entrance Stairs - Number of Steps: 2 steps to landing and additional one  Bathroom Shower/Tub: Shower chair with back  [employer]: Standard  Bathroom Equipment: Grab bars in shower  Home Equipment: 4 wheeled walker, [place] [employer] Help From: Family, Friend(s)  ADL Assistance:  (Pt reports sister aids to wash and clean back side when needed)  Homemaking Responsibilities: No (Pt reports sister performs all IADLs)  Ambulation Assistance: Independent  Transfer Assistance: Independent  Active : No  Patient's  Info: Sister performs all driving  Mode of Transportation: SUV  Occupation: On disability  Type of occupation: garage doors  Leisure & Hobbies: watch TV  Additional Comments: Pt reports inconsistent use of 4WW at home.        Objective   Vision: Impaired  Vision Exceptions: Wears glasses for reading  Hearing: Within functional limits    Orientation  Overall Orientation Status: Within Functional Limits          Balance  Sitting Balance: Supervision (seated on toilet ~5 minutes with BUE propping on knees)  Standing Balance: Stand by assistance (SBA ~3 minutes w/ BUE support on RW during pericare/bottom hygiene)    Functional Mobility  Functional - Mobility Device: Rolling Walker  Activity: To/from bathroom; Other (to nursing station and returned)  Assist Level: Contact guard assistance  Functional Mobility Comments: no true LOB - pt's O2 ranged from 84-95% - recovery obtained with standing rest breaks x2 and cues to incorporate pursed lip breathing technique    Toilet Transfers  Toilet - Technique: Ambulating (w/RW)  Equipment Used: Standard toilet (bilateral use of grab bars)  Toilet Transfer: Stand by assistance    ADL  Feeding: Independent  Grooming: Increased time to complete;Setup;Supervision  UE Bathing: Increased time to complete; Moderate assistance  LE Bathing: Increased time to complete; Moderate assistance  UE Dressing: Increased time to complete;Setup;Contact guard assistance (to don front and back gown seated in recliner)  LE Dressing: Increased time to complete;Maximum assistance (thread BLE and don brief)  Toileting: Increased time to complete;Maximum assistance (in standing w/BUE support on RW for pericare/bottom hygiene)       Tone RUE  RUE Tone: Normotonic  Tone LUE  LUE Tone: Normotonic  Coordination  Movements Are Fluid And Coordinated: No  Coordination and Movement description: Fine motor impairments;Decreased speed  Quality of Movement Other  Comment: mild tremors in bilateral hands - decreased speed with bilateral digit to thumb opposition          Bed mobility  Comment: Pt up to chair at start and end of today's session. Transfers  Sit to stand: Stand by assistance  Stand to sit: Stand by assistance          Cognition  Overall Cognitive Status: Exceptions  Arousal/Alertness: Appropriate responses to stimuli  Following Commands:  Follows multistep commands consistently  Attention Span: Appears intact  Memory: Appears intact  Safety Judgement: Decreased awareness of need for safety;Decreased awareness of need for assistance  Problem Solving: Assistance required to identify errors made  Insights: Decreased awareness of deficits  Initiation: Does not require cues  Sequencing: Does not require cues             Sensation  Overall Sensation Status: WFL        LUE AROM (degrees)  LUE AROM : WFL  RUE AROM (degrees)  RUE AROM : WFL     LUE Strength  Gross LUE Strength: WFL  LUE Strength Comment: grossly 5/5  RUE Strength  Gross RUE Strength: WFL  RUE Strength Comment: grossly 5/5       Plan   Plan  Times per week: 5-6x/week  Times per day: Daily  Current Treatment Recommendations: Safety Education & Training, Self-Care / ADL, Patient/Caregiver Education & Training, Balance Training, Functional Mobility Training, Endurance Training, Equipment Evaluation, Education, & procurement      AM-PAC Score   AM-[facility] Inpatient Daily Activity Raw Score: 16 (07/12/21 1519)  AM-PAC Inpatient ADL T-Scale Score : 35.96 (07/12/21 1519)  ADL Inpatient CMS 0-100% Score: 53.32 (07/12/21 1519)  ADL Inpatient CMS G-Code Modifier : CK (07/12/21 1519)    Goals  Short term goals  Time Frame for Short term goals: 14 visits  Short term goal 1: Pt will demo supervision w/setup and DME when needed to perform LB dressing/bathing and toileting. Short term goal 2: Pt will demo Mod IND, with DME as needed to perform UB dressing/bathing and grooming. Short term goal 3: Pt will IND incorporate energy conservation techniques for increased activity tolerance during participation throughout all functional activities. Short term goal 4: Pt will demo  Mod IND, with LRD, to perform functional mobility/transfers during ADLs,  Short term goal 5: Pt will tolerate 15+ minutes of activity tolerance to maximize independence required for participation in ADLs and functional transfers/mobility.        Therapy Time   Individual Concurrent Group Co-treatment   Time In 1319         Time Out 1402         Minutes 43         Timed Code Treatment Minutes: 21 Minutes       Kaleta Surendra, OTR/L

## 2021-07-12 NOTE — PROGRESS NOTES
Spoke with sister, Caren Connors. Updated on plan of care. Will call if there are any plans for discharge.

## 2021-07-12 NOTE — PROGRESS NOTES
Physical Therapy    Facility/Department: [place] [address] SURG ICU  Initial Assessment    NAME: Toni Shea  : 1965  MRN: [id number]    Date of Service: 2021  Chief Complaint   Patient presents with    Shortness of Breath     onset this  am. patient to er per ems, on bi-pap on arrival.       Discharge Recommendations:  Patient would benefit from continued therapy after discharge   PT Equipment Recommendations  Equipment Needed: No  Other: Pt has RW but states he has not used it for mobility \"in a while. \" Writer encourages strict use of RW for stability and to decrease energy expenditure with functional tasks leading to increased independence. Assessment   Body structures, Functions, Activity limitations: Decreased functional mobility ; Decreased ADL status; Decreased strength;Decreased endurance;Decreased coordination;Decreased balance;Decreased posture  Assessment: Pt exhibits limitations in fuctional mobility secondary to decreased activity tolerance and standing balance. Pt ambulated 100' w/ CGA and RW. Pt transferred with SBA. Pt exhibited desaturation with minimal physical exertion on 2L O2. Pt would be safe to return home with assistance as needed, but would need increased rest breaks and strict use of RW with all functional mobility to reduce energy expenditure and desaturation. Pt would continue to benefit from acute therapy and therapy upon discharge to improve activity tolerance and endurance. Prognosis: Good  Decision Making: Medium Complexity  PT Education: Goals; General Safety;Gait Training;PT Role;Plan of Care;Energy Conservation; Functional Mobility Training  Patient Education: Educated on importance of RW use with functional mobility and deep breathing techniques to increase SpO2%. REQUIRES PT FOLLOW UP: Yes  Activity Tolerance  Activity Tolerance: Patient limited by endurance; Patient limited by fatigue  Activity Tolerance: Pt exhibited desaturation with minimal physical exertion. Desaturation to 84% occured x3 throughout session (w/ MMT, w/ ambulation, w/ toileting). SpO2 returned to >90% w/ rest break and deep breathing technique. Patient Diagnosis(es): The encounter diagnosis was COPD exacerbation ([facility] [address] has a past medical history of COPD (chronic obstructive pulmonary disease) ([facility] [address] Diabetes mellitus ([facility] [address] and ROSEY on CPAP. has no past surgical history on file. Restrictions  Restrictions/Precautions  Restrictions/Precautions: Fall Risk, Up as Tolerated  Required Braces or Orthoses?: No  Position Activity Restriction  Other position/activity restrictions: 2L O2 PRN  Vision/Hearing  Vision: Impaired  Vision Exceptions: Wears glasses for reading  Hearing: Within functional limits     Subjective  General  Chart Reviewed: No  Patient assessed for rehabilitation services?: Yes  Response To Previous Treatment: Not applicable  Family / Caregiver Present: No  Follows Commands: Within Functional Limits  Subjective  Subjective: Pt and RN agreeable to therapy. Pt reports chronic BL LE pain but no acute pain on this date. He reports he is breathing a bit better.   Pain Screening  Patient Currently in Pain: Yes  Pain Assessment  Pain Assessment: 0-10  Pain Level: 10  Pain Type: Chronic pain  Pain Location: Leg  Pain Orientation: Right;Left  Pain Frequency: Continuous  Functional Pain Assessment: Prevents or interferes some active activities and ADLs  Non-Pharmaceutical Pain Intervention(s): Ambulation/Increased Activity;Repositioned;Distraction  Response to Pain Intervention: Patient Satisfied  Vital Signs  Patient Currently in Pain: Yes       Orientation  Orientation  Overall Orientation Status: Within Functional Limits  Social/Functional History  Social/Functional History  Lives With: Family (sister and niece)  Type of Home: House  Home Layout: One level  Home Access: Stairs to enter without rails  Entrance Stairs - Number of Steps: 2 steps to landing and additional one  Bathroom with transfers. Short term goal 4: Pt negotiates 3 stairs independently without rails to allow entrance into prior living arrangement. Short term goal 5: Pt tolerates 30 minutes of therapy to aid endurance with functional mobility. Patient Goals   Patient goals : Pt to return home       Therapy Time   Individual Concurrent Group Co-treatment   Time In 1319         Time Out 1403         Minutes 44         Timed Code Treatment Minutes: 10 Minutes     ALDA Mccoy  Evaluation/treatment performed by Student PT under the supervision of co-signing PT who agrees with all evaluation/treatment and documentation.

## 2021-07-13 VITALS
SYSTOLIC BLOOD PRESSURE: 139 MMHG | OXYGEN SATURATION: 96 % | BODY MASS INDEX: 39.17 KG/M2 | HEART RATE: 71 BPM | WEIGHT: 315 LBS | DIASTOLIC BLOOD PRESSURE: 74 MMHG | HEIGHT: 75 IN | RESPIRATION RATE: 17 BRPM | TEMPERATURE: 97.6 F

## 2021-07-13 LAB
ANION GAP SERPL CALCULATED.3IONS-SCNC: 14 MMOL/L (ref 9–17)
BUN BLDV-MCNC: 31 MG/DL (ref 6–20)
BUN/CREAT BLD: ABNORMAL (ref 9–20)
CALCIUM SERPL-MCNC: 9.2 MG/DL (ref 8.6–10.4)
CHLORIDE BLD-SCNC: 101 MMOL/L (ref 98–107)
CO2: 21 MMOL/L (ref 20–31)
CREAT SERPL-MCNC: 1.02 MG/DL (ref 0.7–1.2)
GFR AFRICAN AMERICAN: >60 ML/MIN
GFR NON-AFRICAN AMERICAN: >60 ML/MIN
GFR SERPL CREATININE-BSD FRML MDRD: ABNORMAL ML/MIN/{1.73_M2}
GFR SERPL CREATININE-BSD FRML MDRD: ABNORMAL ML/MIN/{1.73_M2}
GLUCOSE BLD-MCNC: 188 MG/DL (ref 75–110)
GLUCOSE BLD-MCNC: 220 MG/DL (ref 75–110)
GLUCOSE BLD-MCNC: 266 MG/DL (ref 70–99)
GLUCOSE BLD-MCNC: 285 MG/DL (ref 75–110)
HCT VFR BLD CALC: 42.2 % (ref 41–53)
HEMOGLOBIN: 13.6 G/DL (ref 13.5–17.5)
MCH RBC QN AUTO: 29.7 PG (ref 26–34)
MCHC RBC AUTO-ENTMCNC: 32.4 G/DL (ref 31–37)
MCV RBC AUTO: 91.7 FL (ref 80–100)
NRBC AUTOMATED: ABNORMAL PER 100 WBC
PDW BLD-RTO: 16.1 % (ref 12.5–15.4)
PLATELET # BLD: 226 K/UL (ref 140–450)
PMV BLD AUTO: 10 FL (ref 6–12)
POTASSIUM SERPL-SCNC: 4.3 MMOL/L (ref 3.7–5.3)
RBC # BLD: 4.6 M/UL (ref 4.5–5.9)
SODIUM BLD-SCNC: 136 MMOL/L (ref 135–144)
WBC # BLD: 13.1 K/UL (ref 3.5–11)

## 2021-07-13 PROCEDURE — 97110 THERAPEUTIC EXERCISES: CPT

## 2021-07-13 PROCEDURE — 99232 SBSQ HOSP IP/OBS MODERATE 35: CPT | Performed by: INTERNAL MEDICINE

## 2021-07-13 PROCEDURE — 6370000000 HC RX 637 (ALT 250 FOR IP): Performed by: NURSE PRACTITIONER

## 2021-07-13 PROCEDURE — 94640 AIRWAY INHALATION TREATMENT: CPT

## 2021-07-13 PROCEDURE — 99239 HOSP IP/OBS DSCHRG MGMT >30: CPT | Performed by: NURSE PRACTITIONER

## 2021-07-13 PROCEDURE — 85027 COMPLETE CBC AUTOMATED: CPT

## 2021-07-13 PROCEDURE — 94761 N-INVAS EAR/PLS OXIMETRY MLT: CPT

## 2021-07-13 PROCEDURE — 97535 SELF CARE MNGMENT TRAINING: CPT

## 2021-07-13 PROCEDURE — 94618 PULMONARY STRESS TESTING: CPT

## 2021-07-13 PROCEDURE — 94660 CPAP INITIATION&MGMT: CPT

## 2021-07-13 PROCEDURE — 36415 COLL VENOUS BLD VENIPUNCTURE: CPT

## 2021-07-13 PROCEDURE — 97116 GAIT TRAINING THERAPY: CPT

## 2021-07-13 PROCEDURE — 80048 BASIC METABOLIC PNL TOTAL CA: CPT

## 2021-07-13 PROCEDURE — 82947 ASSAY GLUCOSE BLOOD QUANT: CPT

## 2021-07-13 PROCEDURE — 2700000000 HC OXYGEN THERAPY PER DAY

## 2021-07-13 PROCEDURE — 2580000003 HC RX 258: Performed by: NURSE PRACTITIONER

## 2021-07-13 RX ORDER — PREDNISONE 10 MG/1
TABLET ORAL
Qty: 30 TABLET | Refills: 0 | Status: SHIPPED | OUTPATIENT
Start: 2021-07-13 | End: 2021-07-13 | Stop reason: SDUPTHER

## 2021-07-13 RX ORDER — PREDNISONE 10 MG/1
TABLET ORAL
Qty: 30 TABLET | Refills: 0 | Status: SHIPPED | OUTPATIENT
Start: 2021-07-13 | End: 2021-07-25

## 2021-07-13 RX ADMIN — INSULIN LISPRO 2 UNITS: 100 INJECTION, SOLUTION INTRAVENOUS; SUBCUTANEOUS at 12:18

## 2021-07-13 RX ADMIN — PREGABALIN 50 MG: 25 CAPSULE ORAL at 08:22

## 2021-07-13 RX ADMIN — INSULIN LISPRO 4 UNITS: 100 INJECTION, SOLUTION INTRAVENOUS; SUBCUTANEOUS at 08:22

## 2021-07-13 RX ADMIN — FUROSEMIDE 40 MG: 20 TABLET ORAL at 08:22

## 2021-07-13 RX ADMIN — APIXABAN 5 MG: 5 TABLET, FILM COATED ORAL at 08:22

## 2021-07-13 RX ADMIN — PREDNISONE 40 MG: 20 TABLET ORAL at 08:22

## 2021-07-13 RX ADMIN — IPRATROPIUM BROMIDE AND ALBUTEROL SULFATE 1 AMPULE: .5; 2.5 SOLUTION RESPIRATORY (INHALATION) at 15:57

## 2021-07-13 RX ADMIN — PREGABALIN 50 MG: 25 CAPSULE ORAL at 13:23

## 2021-07-13 RX ADMIN — PANTOPRAZOLE SODIUM 40 MG: 40 TABLET, DELAYED RELEASE ORAL at 08:22

## 2021-07-13 RX ADMIN — SODIUM CHLORIDE, PRESERVATIVE FREE 10 ML: 5 INJECTION INTRAVENOUS at 08:22

## 2021-07-13 RX ADMIN — IPRATROPIUM BROMIDE AND ALBUTEROL SULFATE 1 AMPULE: .5; 2.5 SOLUTION RESPIRATORY (INHALATION) at 12:37

## 2021-07-13 RX ADMIN — IPRATROPIUM BROMIDE AND ALBUTEROL SULFATE 1 AMPULE: .5; 2.5 SOLUTION RESPIRATORY (INHALATION) at 07:59

## 2021-07-13 ASSESSMENT — PAIN SCALES - GENERAL
PAINLEVEL_OUTOF10: 6
PAINLEVEL_OUTOF10: 3
PAINLEVEL_OUTOF10: 3
PAINLEVEL_OUTOF10: 10

## 2021-07-13 ASSESSMENT — PAIN DESCRIPTION - PAIN TYPE
TYPE: CHRONIC PAIN

## 2021-07-13 ASSESSMENT — PAIN DESCRIPTION - PROGRESSION: CLINICAL_PROGRESSION: NOT CHANGED

## 2021-07-13 ASSESSMENT — PAIN - FUNCTIONAL ASSESSMENT: PAIN_FUNCTIONAL_ASSESSMENT: PREVENTS OR INTERFERES SOME ACTIVE ACTIVITIES AND ADLS

## 2021-07-13 ASSESSMENT — PAIN DESCRIPTION - LOCATION: LOCATION: LEG

## 2021-07-13 ASSESSMENT — PAIN DESCRIPTION - ORIENTATION: ORIENTATION: RIGHT;LEFT

## 2021-07-13 ASSESSMENT — PAIN DESCRIPTION - ONSET: ONSET: ON-GOING

## 2021-07-13 ASSESSMENT — PAIN DESCRIPTION - FREQUENCY: FREQUENCY: CONTINUOUS

## 2021-07-13 ASSESSMENT — PAIN DESCRIPTION - DESCRIPTORS: DESCRIPTORS: CONSTANT

## 2021-07-13 NOTE — CONSULTS
Historical Provider, MD   atorvastatin (LIPITOR) 40 MG tablet Take 1 tablet by mouth nightly 21   Wynonia Husbands, APRN - NP   furosemide (LASIX) 40 MG tablet Take 1 tablet by mouth 2 times daily 21   Wynonia Husbands, APRN - NP   albuterol (PROVENTIL) (2.5 MG/3ML) 0.083% nebulizer solution USE 1 VIAL IN NEBULIZER EVERY 6 HOURS AS NEEDED FOR WHEEZING FOR SHORTNESS OF BREATH 21   Historical Provider, MD   apixaban (ELIQUIS) 5 MG TABS tablet Take 1 tablet by mouth twice daily 3/29/21   Historical Provider, MD   budesonide-formoterol (SYMBICORT) 160-4.5 MCG/ACT AERO Inhale 2 puffs into the lungs 2 times daily 10/8/20   Historical Provider, MD   hydrOXYzine (VISTARIL) 25 MG capsule TAKE 1 CAPSULE BY MOUTH EVERY 8 HOURS AS NEEDED FOR ANXIETY 21   Historical Provider, MD   pregabalin (LYRICA) 50 MG capsule Take 50 mg by mouth 3 times daily. 21   Historical Provider, MD   traMADol (ULTRAM) 50 MG tablet TAKE 1 TABLET BY MOUTH ONCE DAILY AS NEEDED FOR PAIN 21   Historical Provider, MD   zolpidem (AMBIEN) 10 MG tablet Take 10 mg by mouth nightly as needed. 21   Historical Provider, MD     IMMUNIZATIONS:    There is no immunization history on file for this patient. REVIEW OF SYSTEMS:  Review of Systems    OBJECTIVE     PaO2/FiO2 RATIO:  No results for input(s): POCPO2 in the last 72 hours.    FiO2 : 25 %     VITAL SIGNS:   LAST:  /76   Pulse 81   Temp 98.5 °F (36.9 °C) (Oral)   Resp 21   Ht 6' 3\" (1.905 m)   Wt (!) 362 lb 9.6 oz (164.5 kg)   SpO2 93%   BMI 45.32 kg/m²   8-24 HR RANGE:  TEMP Temp  Av °F (36.7 °C)  Min: 97.6 °F (36.4 °C)  Max: 98.5 °F (96.5 °C)   BP Systolic (60WBL), AOT:119 , Min:116 , DEX:010      Diastolic (51RMD), EKK:75, Min:73, Max:84     PULSE Pulse  Av.8  Min: 69  Max: 81   RR Resp  Av.4  Min: 18  Max: 21   O2 SAT SpO2  Av.7 %  Min: 93 %  Max: 96 %   OXYGEN DELIVERY O2 Flow Rate (L/min)  Av L/min  Min: 2 L/min  Max: 2 L/min SYSTEMIC EXAMINATION:   General appearance -mild respiratory distress  Mental status - awake & alert, follows commands  Eyes - pupils equal and reactive, sclera anicteric  Mouth - mucous membranes moist  Neck - supple, no significant adenopathy  Chest - Breath sounds bilaterally were dimnished to auscultation, no rhonchi or wheezing  Heart - normal rate, regular rhythm, normal S1, S2, no murmurs, rubs, clicks or gallops  Abdomen - soft, nontender, nondistended, no masses or organomegaly  Neurological - non-focal  Extremities - peripheral pulses normal, no pedal edema, no clubbing or cyanosis  Skin - normal coloration and turgor, no rashes, no suspicious skin lesions noted     DATA REVIEW     Medications: Current Inpatient  Scheduled Meds:   insulin lispro  0-12 Units Subcutaneous TID     insulin lispro  0-6 Units Subcutaneous Nightly    apixaban  5 mg Oral BID    atorvastatin  40 mg Oral Nightly    furosemide  40 mg Oral BID    pregabalin  50 mg Oral TID    sodium chloride flush  5-40 mL Intravenous 2 times per day    ipratropium-albuterol  1 ampule Inhalation Q4H WA    [START ON 7/13/2021] predniSONE  40 mg Oral Daily    pantoprazole  40 mg Oral QAM AC     Continuous Infusions:   dextrose      sodium chloride         INPUT/OUTPUT:  In: 510 [P.O.:510]  Out: 2040 [Urine:2040]  Date 07/12/21 0000 - 07/12/21 2359   Shift 1628-4808 0509-2840 4090-2441 24 Hour Total   INTAKE   P.O.(mL/kg/hr) 450(0.3) 510(0.4)  960   Shift Total(mL/kg) 450(2.7) 510(3.1)  960(5.8)   OUTPUT   Urine(mL/kg/hr) 790(0.6) 1250(1)  2040   Shift Total(mL/kg) 790(4.8) 1250(7.6)  2040(12.4)   Weight (kg) 164.5 164.5 164.5 164.5        LABS:  ABGs:   No results for input(s): POCPH, POCPCO2, POCPO2, POCHCO3, QRRZ2RTB in the last 72 hours.   CBC:   Recent Labs     07/10/21  2300 07/12/21  0428   WBC 8.3 13.0*   HGB 14.1 13.7   HCT 43.2 43.1   MCV 91.8 93.6    216   LYMPHOPCT 26  --    RBC 4.71 4.61   MCH 29.9 29.8   MCHC 32.6 31.8   RDW 16.0* 15.8*     CRP:   No results for input(s): CRP in the last 72 hours. LDH:   No results for input(s): LDH in the last 72 hours. BMP:   Recent Labs     07/10/21  2300 07/12/21  0428    137   K 4.3 4.5    105   CO2 24 23   BUN 15 21*   CREATININE 1.03 0.85   GLUCOSE 154* 252*     Liver Function Test:   Recent Labs     07/10/21  2300   PROT 6.9   LABALBU 3.7   ALT 11   AST 12   ALKPHOS 150*   BILITOT 0.39     Coagulation Profile:   Recent Labs     07/10/21  2300   INR 1.0   PROTIME 10.1   APTT 26.2     D-Dimer:  No results for input(s): DDIMER in the last 72 hours. Lactic Acid:  No results for input(s): LACTA in the last 72 hours. Cardiac Enzymes:  No results for input(s): CKTOTAL, CKMB, CKMBINDEX, TROPONINI in the last 72 hours. Invalid input(s): TROPONIN, HSTROP  BNP/ProBNP:   Recent Labs     07/10/21  2300   PROBNP 75     Triglycerides:  No results for input(s): TRIG in the last 72 hours. Microbiology:  Urine Culture:  No components found for: CURINE  Blood Culture:  No components found for: CBLOOD, CFUNGUSBL  Sputum Culture:  No components found for: CSPUTUM  No results for input(s): SPECDESC, SPECIAL, CULTURE, STATUS, ORG, CDIFFTOXPCR, CAMPYLOBPCR, SALMONELLAPC, SHIGAPCR, SHIGELLAPCR, MPNEUG, MPNEUM, LACTOQL in the last 72 hours. No results for input(s): SPUTUM, SPECIAL, CULTURE, STATUS, ORG, CDIFFTOXPCR, MPNEUM, MPNEUG in the last 72 hours. Invalid input(s): Florencia Mins, CFUNGUSBL,  [address] [facility]       Radiology Reports:  XR CHEST PORTABLE   Final Result   Stable mild cardiomegaly with resolving central pulmonary congestion      Slowly resolving bibasilar atelectasis or infiltrates. Echocardiogram:   Results for orders placed during the hospital encounter of 05/09/21    ECHO Complete 2D W Doppler W Color    Summary  Technically difficult study. Normal left ventricular size with normal hyperdynamic function. Left ventricular ejection fraction 65 %.   Mild left ventricular hypertrophy. Technically difficult visualization of the right ventricle, but it does  appear to be normal in size. ASSESSMENT AND PLAN     Assessment:    // Acute hypoxic respiratory failure  // Acute exacerbation of COPD  // COPD, severity to be determined  // Former smoker  // Obstructive sleep apnea    Plan:    I personally interviewed/examined the patient; reviewed interval history, interpreted all available radiographic and laboratory data at the time of service. Patient is hemodynamically stable and is currently saturating well on O2 Flow Rate (L/min)  Av L/min  Min: 2 L/min  Max: 2 L/min  Continue supplemental oxygen to keep oxygen saturation >90%  Encourage incentive spirometry  Continue pulmonary toilet, aspiration precautions and bronchodilators  Continue diuresis with Lasix  Monitor I/O, electrolytes with a goal of even/negative fluid balance  Tolerating oral diet  Stress ulcer prophylaxis  Chemical DVT prophylaxis   Antimicrobials reviewed; currently not on any antimicrobials  Continue IV Solu-Medrol, followed by prednisone taper  Skin/wound care reviewed with the nursing staff  Physical/occupational therapy    He will need home O2 evaluation at the time of discharge and outpatient follow-up in pulmonary clinic for PFT and management of sleep apnea. We will continue to follow. I would like to thank you for allowing me to participate in the care of this patient. Please feel free to call with any further questions or concerns. Jean Carlos Lewis MD  Pulmonary and Critical Care Medicine           2021    Please note that this chart was generated using voice recognition Dragon dictation software. Although every effort was made to ensure the accuracy of this automated transcription, some errors in transcription may have occurred.

## 2021-07-13 NOTE — PROGRESS NOTES
Contacted pulmonary to see if patient is okay for discharge from their standpoint. Jon directed RN to Dr. Ashlie Hidalgo. Awaiting response.

## 2021-07-13 NOTE — PROGRESS NOTES
Call returned to RN. Dr. Gisela Smith states pt is OK for discharge, follow-up with pulmonary clinic in 4 weeks. To have office call, as well.

## 2021-07-13 NOTE — DISCHARGE SUMMARY
[facility]  Office: [address], , Lor Pino, DO, Krishna Walden, DO, Nini Topete, DO, Valiant Sicard, MD, Lucas Anaya MD, Michael Uribe MD, Rick Luna MD, Lisa Fuller MD, Sonali Adorno MD, Gonsalo Lowery MD, Dennis Vilchis, DO, Naima Campos MD, Rima Ying DO, Kailey Freire MD,  Damon Carlisle DO, Kalie Del Rio MD, Paula Wright MD, Renee Mathews MD, Paulino Allen MD, Esdras Perez MD, Brayden Min MD, Javier Prince, [facility], [facility], [facility], Leigh Knowles, [facility], Lizz Gamble, CNS, Kathrine Murphy, CNP, Marii Bedoya, CNP, Sheri Mcduffie, CNP, Remi Campos, CNP, Hermes Wang CNP, Shawn Marley PA-C, Damon Romero, [facility], Jose M Azar, CNP, Shay Naylor, CNP, Emerita Obrien, CNP, Camden Castro, CNP, Essence Dumont, CNP, Opal Piper, CNP, [facility], Orozco Flair, [address] [facility]    Discharge Summary     Patient ID: Paradise Rodriguez  :  1965   MRN: [id number]     ACCOUNT:  [de-identified]   Patient's PCP: DANIEL Hancock CNP  Admit Date: 7/10/2021   Discharge Date: 2021   Length of Stay: 2  Code Status:  Full Code  Admitting Physician: Paula Wright MD  Discharge Physician: DANIEL Bower CNP     Active Discharge Diagnoses:     Hospital Problem Lists:  Principal Problem:    COPD exacerbation ([facility]  Active Problems:    Diabetes mellitus type 2, controlled ([facility]    GERD (gastroesophageal reflux disease)    Sleep apnea    COPD (chronic obstructive pulmonary disease) ([facility]    Anxiety    Long term (current) use of anticoagulants  Resolved Problems:    * No resolved hospital problems.  *      Admission Condition:  poor     Discharged Condition: fair    Hospital Stay:     Hospital Course:  Rico [place] a 49 y.o. Non-/non  male who presents with Shortness of Breath (onset this  am. patient to er per ems, on bi-pap on arrival.)   and is admitted to the hospital for the management of COPD exacerbation ([facility]    Patient reports that last night he became very short of breath.  He says he was wearing his CPAP.  He says he does have an appointment with a lung doctor, but he is not sure as his sister keeps track of his appointments. Jocelin Cardona denies any sick contacts, smoking, chemical exposure. Jocelin Cardona says that he is compliant with his CPAP and wears it at night and when he is sleeping and sometimes during the day when he feels short of breath.  He complained of feeling short of breath at rest, and the dyspnea became worse when he was moving around.      While in the ED, chest x-ray revealed stable mild cardiomegaly with resolving central pulmonary congestion, slowly resolving bibasilar atelectasis/infiltrates.  He was admitted to the inpatient nursing unit for observation and management of COPD exacerbation.     Pulmonary was consulted due to repeated hospitalizations for COPD exacerbations. Pulmonary evaluated. Per their recommendation we should continue supplemental oxygen to keep O2 sat greater than 90%. Pulmonary toileting. Bronchodilators, diuresis and continue the Solu-Medrol with a prednisone taper for discharge. Per assessment today  General appearance:  alert, cooperative and no distress  Mental Status:  oriented to person, place and time and blunt affect  Lungs: Diminished throughout all lung fields, normal effort  Heart:  regular rate and rhythm, no murmur  Abdomen:  soft, nontender, nondistended, normal bowel sounds, no masses, hepatomegaly, splenomegaly  Extremities: 1+ BLE edema, no redness, tenderness in the calves, chronic vascular discoloration  Skin:  no gross lesions, rashes, induration    According to a nocturnal sleep study at 0052 on 7/12/2021 the patient's pulse ox was 84% on room air. 1 L nasal cannula was applied with improvement in saturation. Patient would qualify for nocturnal oxygen at discharge.     RT also completed a daytime sleep study and per their documentation the patient does not qualify for oxygen during the day    Significant therapeutic interventions:   Solu-Medrol  Lasix  Pulmonary consult  Nocturnal and a sleep study  Order for nocturnal oxygen at discharge      Significant Diagnostic Studies:   Labs / Micro:  CBC:   Lab Results   Component Value Date    WBC 13.1 07/13/2021    RBC 4.60 07/13/2021    HGB 13.6 07/13/2021    HCT 42.2 07/13/2021    MCV 91.7 07/13/2021    MCH 29.7 07/13/2021    MCHC 32.4 07/13/2021    RDW 16.1 07/13/2021     07/13/2021     BMP:    Lab Results   Component Value Date    GLUCOSE 266 07/13/2021     07/13/2021    K 4.3 07/13/2021     07/13/2021    CO2 21 07/13/2021    ANIONGAP 14 07/13/2021    BUN 31 07/13/2021    CREATININE 1.02 07/13/2021    BUNCRER NOT REPORTED 07/13/2021    CALCIUM 9.2 07/13/2021    LABGLOM >60 07/13/2021    GFRAA >60 07/13/2021    GFR      07/13/2021    GFR NOT REPORTED 07/13/2021     HFP:    Lab Results   Component Value Date    PROT 6.9 07/10/2021     PT/INR:    Lab Results   Component Value Date    PROTIME 10.1 07/10/2021    INR 1.0 07/10/2021     PTT:   Lab Results   Component Value Date    APTT 26.2 07/10/2021        Radiology:  XR CHEST PORTABLE    Result Date: 7/10/2021  Stable mild cardiomegaly with resolving central pulmonary congestion Slowly resolving bibasilar atelectasis or infiltrates. Consultations:    Consults:     Final Specialist Recommendations/Findings:   IP CONSULT TO PULMONOLOGY      The patient was seen and examined on day of discharge and this discharge summary is in conjunction with any daily progress note from day of discharge.     Discharge plan:     Disposition: Home    Physician Follow Up:     DANIEL Marroquin - CNP  [place], [address]  [address] [address]  [phone]      Follow-up in 7 to 10 days    Reina Schumacher[address] follow-up in 2 to 3 weeks Diet: cardiac diet, diabetic diet and low fat, low cholesterol diet    Activity: As tolerated    Instructions to Patient: Patient encouraged to take medications as prescribed. Strongly encouraged to follow-up with pulmonary medicine and PCP at discharge. Call 911 or return to the ER if you experience a recurrence of your symptoms or start having fever, chills, or chest pain    Hemoglobin A1c 6.2-patient encouraged discussed with PCP    Discharge Medications:      Medication List      START taking these medications    predniSONE 10 MG tablet  Commonly known as: DELTASONE  Take 4 tablets by mouth daily for 3 days, THEN 3 tablets daily for 3 days, THEN 2 tablets daily for 3 days, THEN 1 tablet daily for 3 days. Start taking on: July 13, 2021        CONTINUE taking these medications    albuterol (2.5 MG/3ML) 0.083% nebulizer solution  Commonly known as: PROVENTIL     atorvastatin 40 MG tablet  Commonly known as: LIPITOR  Take 1 tablet by mouth nightly     Eliquis 5 MG Tabs tablet  Generic drug: apixaban     furosemide 40 MG tablet  Commonly known as: LASIX  Take 1 tablet by mouth 2 times daily     hydrOXYzine 25 MG capsule  Commonly known as: VISTARIL     oxyCODONE-acetaminophen 5-325 MG per tablet  Commonly known as: PERCOCET     pregabalin 50 MG capsule  Commonly known as: LYRICA     Symbicort 160-4.5 MCG/ACT Aero  Generic drug: budesonide-formoterol     traMADol 50 MG tablet  Commonly known as: ULTRAM     zolpidem 10 MG tablet  Commonly known as: AMBIEN           Where to Get Your Medications      These medications were sent to [facility], [address] [address]    Phone: [phone]   · predniSONE 10 MG tablet         Discharge Procedure Orders   DME Order for Home Oxygen as OP   Order Comments:  You must complete the order parameters below and add the medical necessity documentation for this DME in a separate note.    Stationary Oxygen Concentrator at 1 lpm via Nasal Cannula    Stationary Prescribed at:  Non Continuous while sleeping    Portable Gaseous O2 System and contents at 1 lpm via Nasal Cannula    Non Applicable    Diagnosis: COPD  Length of need: Lifetime       Time Spent on discharge is  34 mins in patient examination, evaluation, counseling as well as medication reconciliation, prescriptions for required medications, discharge plan and follow up. Electronically signed by   DANIEL Conti CNP  7/13/2021  3:38 PM      Thank you DANIEL Hernadez CNP for the opportunity to be involved in this patient's care. No

## 2021-07-13 NOTE — PROGRESS NOTES
RT in to perform Home 02 eval . Pt currently on room air ,Sat 94% .  During walk sat reads 95% , pt is experiencing some dyspnea with noted Hr of 110 BPM . While on 2lpm sat did increase to 96-97% , Result to NP

## 2021-07-13 NOTE — PLAN OF CARE
Rt in for Aerosol therapy . Pt awake , alert watching Tv . Mask tx tolerated well . few scattered wheezes present this am . Congested cough non productive Bipap on standby at this time.

## 2021-07-13 NOTE — CARE COORDINATION
Patient had nocturnal study that qualifies for oxygen and d/c order placed, gets respiratory supplies from [address] order and clinicals faxed. Call to [facility] - spoke with Deedee - states all clinicals received, they have spoke with patient sister and will deliver O2 to home after discharge.

## 2021-07-13 NOTE — PROGRESS NOTES
Patient was evaluated today for the diagnosis of COPD. I entered a DME order for home oxygen because the diagnosis and testing requires the patient to have supplemental oxygen. Condition will improve or be benefited by oxygen use. The patient qualifies for oxygen at night only at this time. The need for this equipment was discussed with the patient and he understands and is in agreement.

## 2021-07-13 NOTE — PROGRESS NOTES
Home Oxygen Evaluation    Home Oxygen Evaluation completed. Resting SpO2 on room air = 94%    SpO2 on room air with exercise = 95%  SpO2 on oxygen as above with exercise = 97%    Nocturnal Oximetry with patient on room air is recommended is SpO2 is between 89% and 95% (requires additional order).     Fadia Kerr RCP  1:54 PM

## 2021-07-13 NOTE — PROGRESS NOTES
Pulmonary Progress Note    CC:  Chief Complaint   Patient presents with    Shortness of Breath     onset this  am. patient to er per ems, on bi-pap on arrival.      Subjective:  Breathing is better he still has mild expiratory rhonchi. Denies cough with purulent sputum  Or hemoptysis  Review of Systems -  General ROS: negative for - chills, fatigue, fever or weight loss  ENT ROS: negative for - headaches, oral lesions or sore throat  Cardiovascular ROS: no chest pain , orthopnea or pnd   Gastrointestinal ROS: no abdominal pain, change in bowel habits, or black or bloody stools  Skin - no rash   Neuro - no blurry vision , no loc . No focal weakness   msk - no jt tenderness or swelling    Vascular - no claudication , rest completed and negative         PAST MEDICAL HISTORY:       Diagnosis Date    COPD (chronic obstructive pulmonary disease) (HCC)     Diabetes mellitus (HCC)     ROSEY on CPAP          Family History:       Problem Relation Age of Onset    Lung Cancer Mother        SURGICAL HISTORY:   History reviewed. No pertinent surgical history. TOBACCO:   reports that he quit smoking about 6 months ago. His smoking use included cigarettes. He has a 25.00 pack-year smoking history. He has never used smokeless tobacco.  ETOH:   reports previous alcohol use. ALLERGIES:  No Known Allergies    Home Meds:   Prior to Admission medications    Medication Sig Start Date End Date Taking? Authorizing Provider   predniSONE (DELTASONE) 10 MG tablet Take 4 tablets by mouth daily for 3 days, THEN 3 tablets daily for 3 days, THEN 2 tablets daily for 3 days, THEN 1 tablet daily for 3 days. 7/13/21 7/25/21 Yes DANIEL Bond - CNP   oxyCODONE-acetaminophen (PERCOCET) 5-325 MG per tablet Take 1 tablet by mouth every 8 hours as needed.  6/2/21   Historical Provider, MD   atorvastatin (LIPITOR) 40 MG tablet Take 1 tablet by mouth nightly 6/12/21   DANIEL Leavitt - NP   furosemide (LASIX) 40 MG tablet Take 1 tablet by mouth 2 times daily 6/12/21   DANIEL Marquez NP   albuterol (PROVENTIL) (2.5 MG/3ML) 0.083% nebulizer solution USE 1 VIAL IN NEBULIZER EVERY 6 HOURS AS NEEDED FOR WHEEZING FOR SHORTNESS OF BREATH 2/22/21   Historical Provider, MD   apixaban (ELIQUIS) 5 MG TABS tablet Take 1 tablet by mouth twice daily 3/29/21   Historical Provider, MD   budesonide-formoterol (SYMBICORT) 160-4.5 MCG/ACT AERO Inhale 2 puffs into the lungs 2 times daily 10/8/20   Historical Provider, MD   hydrOXYzine (VISTARIL) 25 MG capsule TAKE 1 CAPSULE BY MOUTH EVERY 8 HOURS AS NEEDED FOR ANXIETY 5/6/21   Historical Provider, MD   pregabalin (LYRICA) 50 MG capsule Take 50 mg by mouth 3 times daily. 4/27/21   Historical Provider, MD   traMADol (ULTRAM) 50 MG tablet TAKE 1 TABLET BY MOUTH ONCE DAILY AS NEEDED FOR PAIN 4/22/21   Historical Provider, MD   zolpidem (AMBIEN) 10 MG tablet Take 10 mg by mouth nightly as needed. 4/27/21   Historical Provider, MD         Intake/Output Summary (Last 24 hours) at 7/13/2021 1711  Last data filed at 7/13/2021 1530  Gross per 24 hour   Intake --   Output 2425 ml   Net -2425 ml         Diet   ADULT DIET;  Regular; 4 carb choices (60 gm/meal)    Vitals:   BP (!) 123/98   Pulse 63   Temp 97.9 °F (36.6 °C) (Oral)   Resp 19   Ht 6' 3\" (1.905 m)   Wt (!) 361 lb (163.7 kg)   SpO2 94%   BMI 45.12 kg/m²  on         I/O (24 Hours)    Patient Vitals for the past 8 hrs:   Resp SpO2   07/13/21 1557 19 94 %   07/13/21 1238 18 91 %       Intake/Output Summary (Last 24 hours) at 7/13/2021 1711  Last data filed at 7/13/2021 1530  Gross per 24 hour   Intake --   Output 2425 ml   Net -2425 ml     I/O last 3 completed shifts:  In: -   Out: 7066 [Urine:1875]   Date 07/13/21 0000 - 07/13/21 2359   Shift 6088-8683 2853-3630 9384-1224 24 Hour Total   INTAKE   Shift Total(mL/kg)       OUTPUT   Urine(mL/kg/hr) 550(0.4) 1875(1.4)  2425   Shift Total(mL/kg) 550(3.4) 1875(11.5)  2425(14.8)   Weight (kg) 163.7 163.7 163.7 163.7     Patient Vitals for the past 96 hrs (Last 3 readings):   Weight   07/13/21 0447 (!) 361 lb (163.7 kg)   07/11/21 0411 (!) 362 lb 9.6 oz (164.5 kg)          PHYSICAL EXAMINATION:  Neck: Short thick neck, low hanging soft palate, large tongue, large uvula. No adenopathy, no goiter,     Head and neck atraumatic, normocephalic    Lymph nodes-no cervical, supraclavicular lymphadenopathy    Neck-no JVP elevation    Lungs - AP diameter of chest increased. Thoracic expansion and diaphragmatic excursion diminished. BS diminished and expiratory phase prolonged. No dullness to percussion or tenderness to palpation. No bronchial breath sounds . CVS- S1, S2 regular. No S3 no S4, no murmurs    Abdomen-nontender, nondistended. Bowel sounds are present. No organomegaly    Lower extremity-+ edema    Upper extremity-no edema    Neurological-grossly normal cranial nerves.   No overt motor deficit         Medications:    Scheduled Meds:   insulin lispro  0-12 Units Subcutaneous TID     insulin lispro  0-6 Units Subcutaneous Nightly    apixaban  5 mg Oral BID    atorvastatin  40 mg Oral Nightly    furosemide  40 mg Oral BID    pregabalin  50 mg Oral TID    sodium chloride flush  5-40 mL Intravenous 2 times per day    ipratropium-albuterol  1 ampule Inhalation Q4H WA    predniSONE  40 mg Oral Daily    pantoprazole  40 mg Oral QAM AC       Continuous Infusions:   dextrose      sodium chloride         PRN Meds:  hydrOXYzine, glucose, dextrose, glucagon (rDNA), dextrose, sodium chloride flush, sodium chloride, ondansetron **OR** ondansetron, polyethylene glycol, acetaminophen **OR** acetaminophen, albuterol    Labs:  CBC:   Recent Labs     07/10/21  2300 07/12/21  0428 07/13/21  0530   WBC 8.3 13.0* 13.1*   HGB 14.1 13.7 13.6   HCT 43.2 43.1 42.2   MCV 91.8 93.6 91.7    216 226     BMP:   Recent Labs     07/10/21  2300 07/12/21  0428 07/13/21  0530    137 136   K 4.3 4.5 4.3    105 101   CO2 24 23 21   BUN 15 21* 31*   CREATININE 1.03 0.85 1.02     LIVER PROFILE:   Recent Labs     07/10/21  2300   AST 12   ALT 11   LIPASE 22   BILITOT 0.39   ALKPHOS 150*     PT/INR:   Recent Labs     07/10/21  2300   PROTIME 10.1   INR 1.0     APTT:   Recent Labs     07/10/21  2300   APTT 26.2     UA:No results for input(s): NITRITE, COLORU, PHUR, LABCAST, WBCUA, RBCUA, MUCUS, TRICHOMONAS, YEAST, BACTERIA, CLARITYU, SPECGRAV, LEUKOCYTESUR, UROBILINOGEN, BILIRUBINUR, BLOODU, GLUCOSEU, AMORPHOUS in the last 72 hours. Invalid input(s): KETONESU  No results for input(s): PHART, OEH9RQB, PO2ART in the last 72 hours. ABG   No results found for: PH, PCO2, PO2, HCO3, O2SAT  No results found for: IFIO2, MODE, SETTIDVOL, SETPEEP    Cx:  XR CHEST PORTABLE    Result Date: 7/10/2021  Stable mild cardiomegaly with resolving central pulmonary congestion Slowly resolving bibasilar atelectasis or infiltrates. Assessment:   Principal Problem:    COPD exacerbation (HCC)  Active Problems:    Diabetes mellitus type 2, controlled ([facility]    GERD (gastroesophageal reflux disease)    Sleep apnea    COPD (chronic obstructive pulmonary disease) ([facility] [address]    Anxiety    Long term (current) use of anticoagulants  Resolved Problems:    * No resolved hospital problems. *         Plan:     1. Continue Bronchodilators - Albuterol ATROVENT  and LABA + ICS  2. Steroid Taper    3. Continue home CPAP  4. Continue supplemental oxygen keep saturations greater than 88%  5.  Follow-up in pulmonary clinic in 4 to 6 weeks       Electronically signed by Norah Mg MD on 7/13/2021 at 5:11 PM

## 2021-07-13 NOTE — PROGRESS NOTES
Occupational Therapy  Facility/Department: [facility] SURG ICU  Daily Treatment Note  NAME: Padmini Arnold  : 1965  MRN: [id number]    Date of Service: 2021    Discharge Recommendations:  Patient would benefit from continued therapy after discharge     OT Equipment Recommendations  Equipment Needed: Yes  Mobility Devices: Kacie Beer: Rolling    Assessment   Performance deficits / Impairments: Decreased functional mobility ; Decreased safe awareness;Decreased balance;Decreased ADL status; Decreased endurance;Decreased fine motor control  Assessment: pt would continue to benefit from skilled OT services during hospitalization and upon discharge to maximize pt's improvement in above stated deficits. Pt would be unsafe to return to prior living arrangements at this time without further therapy servicies to promote activity tolerance and independence to perform ADLs and functional transfers/mobility. Prognosis: Good;Fair  OT Education: OT Role;Plan of Care;Energy Conservation; ADL Adaptive Strategies; Equipment;Precautions  Patient Education: pursed lip breathing  REQUIRES OT FOLLOW UP: Yes  Activity Tolerance  Activity Tolerance: Patient limited by fatigue  Activity Tolerance: d/t COPD exacerbation  Safety Devices  Safety Devices in place: Yes  Type of devices: Call light within reach; Left in chair;Nurse notified; Chair alarm in place  Restraints  Initially in place: No         Patient Diagnosis(es): The primary encounter diagnosis was COPD exacerbation ([facility] [address] A diagnosis of Chronic obstructive pulmonary disease with acute exacerbation ([facility] [address] was also pertinent to this visit. has a past medical history of COPD (chronic obstructive pulmonary disease) ([facility] [address] Diabetes mellitus ([facility] [address] and ROSEY on CPAP. has no past surgical history on file.     Restrictions  Restrictions/Precautions  Restrictions/Precautions: Fall Risk  Required Braces or Orthoses?: No  Position Activity Restriction  Other position/activity restrictions: Up as tolerated - PRN 2L O2 via nasal cannula     Subjective   General  Patient assessed for rehabilitation services?: Yes  Response to previous treatment: Patient reporting fatigue but able to participate  Family / Caregiver Present: No  General Comment  Comments: RN ok'd for tx this PM.  Pain Assessment  Pain Assessment: 0-10  Pain Level: 10  Pain Type: Chronic pain  Pain Location: Leg  Pain Orientation: Right;Left  Pain Descriptors: Constant  Pain Frequency: Continuous  Pain Onset: On-going  Clinical Progression: Not changed  Functional Pain Assessment: Prevents or interferes some active activities and ADLs  Non-Pharmaceutical Pain Intervention(s): Ambulation/Increased Activity;Repositioned; Rest  Response to Pain Intervention: Patient Satisfied  Vital Signs  Patient Currently in Pain: Yes      Orientation  Orientation  Overall Orientation Status: Within Functional Limits     Objective    ADL  UE Bathing: Minimal assistance;Setup (min A setaed in recliner to wash/dry back)  LE Bathing: Setup;Verbal cueing;Minimal assistance (setup in seated position to wash/dry thighs - min A required in standing for ellen hygiene)  UE Dressing: Setup (to don shirt in seated)  LE Dressing: Minimal assistance;Setup;Verbal cueing;Contact guard assistance (seated in recliner, setup and min A to thread BLE through underwear and CGA in standing to pull up and around waist)             Balance  Sitting Balance: Supervision (seated in recliner ~10 minutes for sponge bath and UB/LB dressing)  Standing Balance: Contact guard assistance (CGA in standing ~2 minutes for pericare and LB dressing)    Functional Mobility  Functional - Mobility Device: Rolling Walker  Activity: Other (down dang and return)  Assist Level: Contact guard assistance  Functional Mobility Comments: upon initiation of session, RT present for home O2 eval - pt demo'd functional mobility down the dang and returned w/RW and CGAx1 and 2x standing rest breaks w/SpO2 ranging betwen 95-96% throughout with and w/o 2L O2 via nasal cannula. Upon return to pt room, pt reports feeling fatigued and light headed and writer indicates return to recliner for monitoring in which pt's BP was 116/64 - O2 via L 1st digit 98% - HR 75 bpm. Following sponge bath in recliner, pt reports light headedness subsiding. Toilet Transfers  Toilet - Technique:  (use of urinal at this time)    Bed mobility  Comment: Pt up to chair at start and end of today's session. Transfers  Sit to stand: Stand by assistance  Stand to sit: Stand by assistance  Transfer Comments: increased time to perform; good demonstration this date of safety awareness ( hand placement) during transfers    Cognition  Overall Cognitive Status: Exceptions  Safety Judgement: Decreased awareness of need for assistance  Problem Solving: Assistance required to identify errors made;Assistance required to correct errors made  Insights: Decreased awareness of deficits           Plan   Plan  Times per week: 5-6x/week  Times per day: Daily  Current Treatment Recommendations: Safety Education & Training, Self-Care / ADL, Patient/Caregiver Education & Training, Balance Training, Functional Mobility Training, Endurance Training, Equipment Evaluation, Education, & procurement    Goals  Short term goals  Time Frame for Short term goals: 14 visits  Short term goal 1: Pt will demo supervision w/setup and DME when needed to perform LB dressing/bathing and toileting. Short term goal 2: Pt will demo Mod IND, with DME as needed to perform UB dressing/bathing and grooming. Short term goal 3: Pt will IND incorporate energy conservation techniques for increased activity tolerance during participation throughout all functional activities.   Short term goal 4: Pt will demo  Mod IND, with LRD, to perform functional mobility/transfers during ADLs,  Short term goal 5: Pt will tolerate 15+ minutes of activity tolerance to maximize independence required for participation in ADLs and functional transfers/mobility.        Therapy Time   Individual Concurrent Group Co-treatment   Time In 1335         Time Out 1415         Minutes 40         Timed Code Treatment Minutes: 25 [facility] KARI KeithR/L

## 2021-07-13 NOTE — PROGRESS NOTES
Physical Therapy  Facility/Department: [facility] ICU  Daily Treatment Note  NAME: Alireza Diamond  : 1965  MRN: [id number]    Date of Service: 2021    Discharge Recommendations:  Patient would benefit from continued therapy after discharge   PT Equipment Recommendations  Equipment Needed: No  Other: Pt has RW but states he has not used it for mobility \"in a while. \" Writer encourages strict use of RW for stability and to decrease energy expenditure with functional tasks leading to increased independence. Assessment   Body structures, Functions, Activity limitations: Decreased functional mobility ; Decreased ADL status; Decreased strength;Decreased endurance;Decreased coordination;Decreased balance;Decreased posture  Assessment: Pt exhibits limitations in fuctional mobility secondary to decreased activity tolerance and standing balance. Pt ambulated 250' w/ SBA and RW. Pt transferred with SBA and RW. Pt exhibited fatiue with minimal physical exertion. Pt completed 2 sets of seated LE exercises while maintaining an SpO2 of 92-94%. Pt would be safe to return home with assistance as needed, but would need increased rest breaks and strict use of RW with all functional mobility to reduce energy expenditure and desaturation. Pt would continue to benefit from acute therapy and therapy upon discharge to improve activity tolerance and endurance. PT Education: Gait Training;PT Role;Plan of Care;Energy Conservation; Functional Mobility Training;Transfer Training  Patient Education: Educated on importance of RW use with functional mobility, increased need for rest breaks, and deep breathing techniques to increase SpO2%. REQUIRES PT FOLLOW UP: Yes  Activity Tolerance  Activity Tolerance: Patient limited by endurance; Patient limited by fatigue  Activity Tolerance: Pt exhibited endurance limitations with minimal physical exertion. Pt with audible breathing throughout session.  Increased time and effort to complete all functional mobility. Patient Diagnosis(es): The encounter diagnosis was COPD exacerbation ([facility] [address] has a past medical history of COPD (chronic obstructive pulmonary disease) ([facility] [address] [facility] and ROSEY on CPAP. has no past surgical history on file. Restrictions  Restrictions/Precautions  Restrictions/Precautions: Fall Risk  Required Braces or Orthoses?: No  Position Activity Restriction  Other position/activity restrictions: Up as tolerate  Subjective   General  Chart Reviewed: No  Response To Previous Treatment: Patient with no complaints from previous session. Family / Caregiver Present: No  Subjective  Subjective: Pt and RN agreeable to therapy. Pt denies acute pain and feels he is breathing slightly better - says he is doing [place] [employer]. \"  Pain Screening  Patient Currently in Pain: Denies  Vital Signs  Patient Currently in Pain: Denies       Orientation  Orientation  Overall Orientation Status: Within Functional Limits  Cognition   Cognition  Overall Cognitive Status: WFL  Objective   Bed mobility  Supine to Sit: Stand by assistance  Scooting: Stand by assistance  Comment: Pt supine in bed upon arrival and retired to chair at end of session. HOB elevated ~30 degrees for bed mobility. Pt required increased time and effort to complete bed mobility. Transfers  Sit to Stand: Stand by assistance  Stand to sit: Stand by assistance  Comment: Transfers assessed with RW. SpO2 when sitting EOB for 2-3 minutes was 89%, SpO2 initially upon standing was 88%. Pt instructed to perform deep breathing techniques to achieve SpO2 >90%. Ambulation  Ambulation?: Yes  More Ambulation?: No  Ambulation 1  Surface: level tile  Device: Rolling Walker  Assistance: Stand by assistance  Quality of Gait: forward flexed posture, decreased step length, increased PHILIP, decreased elmer, increased effort  Gait Deviations: Slow Elmer; Increased PHILIP; Decreased step length  Distance: 250' x1  Comments: Pt exhibited minimla desaturation without supplemental O2 during ambulation. Pt's SpO2 ranged from 88-92%. Pt required 2 standing rest breaks ~1 minute each throughout ambulation bout. SpO2 returned to >90% with standing rest breaks and deep breathing techniques. Stairs/Curb  Stairs?: No     Balance  Posture: Fair  Sitting - Static: Fair  Sitting - Dynamic: Fair  Standing - Static: Fair  Standing - Dynamic: Fair  Comments: Standing balance assessed with RW. Pt able to perform static standing rest breaks for 1 minute x3 throughout session without LOB. Exercises  Hip Flexion: seated marches; 10 reps x2  Knee Long Arc Quad: 10 reps x2  Comments: Pt with audible breathing and wheezing type breathes throughout seated LE exercises. Increased time and effort to perform. Pt visibly fatigued and verbalized feeling tired upon conclusion of second set of LE exercises. SpO2 remained between 92-94% with seated LE exercises. Other exercises  Other exercises?: No     Goals  Short term goals  Time Frame for Short term goals: 14 visits  Short term goal 1: Pt ambulates 150' modified independent with RW without standing rest breaks to increase endurance with functional mobility. Short term goal 2: Pt performs supine <> sit independently to improve independence with bed mobility. Short term goal 3: Pt demonstrates sit <> stand modified independent w/ RW to improve independence with transfers. Short term goal 4: Pt negotiates 3 stairs independently without rails to allow entrance into prior living arrangement. Short term goal 5: Pt tolerates 30 minutes of therapy to aid endurance with functional mobility.   Patient Goals   Patient goals : Pt to return home    Plan    Plan  Times per week: 5-6x  Times per day: Daily  Current Treatment Recommendations: Strengthening, ROM, Balance Training, Functional Mobility Training, Transfer Training, ADL/Self-care Training, Stair training, Gait Training, Endurance Training, Neuromuscular Re-education, Patient/Caregiver Education & Training, Safety Education & Training, Home Exercise Program  Safety Devices  Type of devices: Left in chair, Nurse notified, Call light within reach, Gait belt  Restraints  Initially in place: No     Therapy Time   Individual Concurrent Group Co-treatment   Time In 1140         Time Out 1212         Minutes 32         Timed Code Treatment Minutes: [place]. #5 Benedicte Rahul Patel Final, SPT  Evaluation/treatment performed by Student PT under the supervision of co-signing PT who agrees with all evaluation/treatment and documentation.

## 2021-07-13 NOTE — PROGRESS NOTES
RT in for routine Aerosol therapy the patient alert sitting in Lazy boy chair . Room air sat 94% . Breath sounds diminished pre bronchodilator tx , post expiratory wheezes present . Congested non productive cough .

## 2021-07-15 LAB
HAV IGM SER IA-ACNC: ABNORMAL
HEPATITIS B CORE IGM ANTIBODY: ABNORMAL
HEPATITIS B SURFACE ANTIGEN: ABNORMAL
HEPATITIS C ANTIBODY: ABNORMAL
HIV AG/AB: ABNORMAL

## 2021-07-28 ENCOUNTER — APPOINTMENT (OUTPATIENT)
Dept: CT IMAGING | Age: 56
End: 2021-07-28
Payer: MEDICAID

## 2021-07-28 ENCOUNTER — HOSPITAL ENCOUNTER (EMERGENCY)
Age: 56
Discharge: HOME | End: 2021-07-28
Attending: EMERGENCY MEDICINE
Payer: MEDICAID

## 2021-07-28 ENCOUNTER — TELEPHONE (OUTPATIENT)
Dept: OTHER | Facility: CLINIC | Age: 56
End: 2021-07-28

## 2021-07-28 VITALS
SYSTOLIC BLOOD PRESSURE: 130 MMHG | WEIGHT: 300 LBS | DIASTOLIC BLOOD PRESSURE: 83 MMHG | HEIGHT: 75 IN | RESPIRATION RATE: 18 BRPM | BODY MASS INDEX: 37.3 KG/M2 | OXYGEN SATURATION: 92 % | HEART RATE: 66 BPM | TEMPERATURE: 98.3 F

## 2021-07-28 DIAGNOSIS — M54.50 LOW BACK PAIN, UNSPECIFIED BACK PAIN LATERALITY, UNSPECIFIED CHRONICITY, UNSPECIFIED WHETHER SCIATICA PRESENT: Primary | ICD-10-CM

## 2021-07-28 PROCEDURE — 6360000002 HC RX W HCPCS: Performed by: EMERGENCY MEDICINE

## 2021-07-28 PROCEDURE — 6370000000 HC RX 637 (ALT 250 FOR IP): Performed by: EMERGENCY MEDICINE

## 2021-07-28 PROCEDURE — 72131 CT LUMBAR SPINE W/O DYE: CPT

## 2021-07-28 PROCEDURE — 96372 THER/PROPH/DIAG INJ SC/IM: CPT

## 2021-07-28 PROCEDURE — 99285 EMERGENCY DEPT VISIT HI MDM: CPT

## 2021-07-28 RX ORDER — LIDOCAINE 4 G/G
1 PATCH TOPICAL ONCE
Status: DISCONTINUED | OUTPATIENT
Start: 2021-07-28 | End: 2021-07-28 | Stop reason: HOSPADM

## 2021-07-28 RX ORDER — OXYCODONE HYDROCHLORIDE AND ACETAMINOPHEN 5; 325 MG/1; MG/1
2 TABLET ORAL ONCE
Status: DISCONTINUED | OUTPATIENT
Start: 2021-07-28 | End: 2021-07-28

## 2021-07-28 RX ORDER — MORPHINE SULFATE 4 MG/ML
4 INJECTION, SOLUTION INTRAMUSCULAR; INTRAVENOUS ONCE
Status: COMPLETED | OUTPATIENT
Start: 2021-07-28 | End: 2021-07-28

## 2021-07-28 RX ORDER — OXYCODONE HYDROCHLORIDE AND ACETAMINOPHEN 5; 325 MG/1; MG/1
2 TABLET ORAL ONCE
Status: COMPLETED | OUTPATIENT
Start: 2021-07-28 | End: 2021-07-28

## 2021-07-28 RX ORDER — ORPHENADRINE CITRATE 30 MG/ML
60 INJECTION INTRAMUSCULAR; INTRAVENOUS ONCE
Status: COMPLETED | OUTPATIENT
Start: 2021-07-28 | End: 2021-07-28

## 2021-07-28 RX ORDER — KETOROLAC TROMETHAMINE 30 MG/ML
30 INJECTION, SOLUTION INTRAMUSCULAR; INTRAVENOUS ONCE
Status: COMPLETED | OUTPATIENT
Start: 2021-07-28 | End: 2021-07-28

## 2021-07-28 RX ADMIN — ORPHENADRINE CITRATE 60 MG: 30 INJECTION INTRAMUSCULAR; INTRAVENOUS at 06:30

## 2021-07-28 RX ADMIN — MORPHINE SULFATE 4 MG: 4 INJECTION INTRAVENOUS at 07:46

## 2021-07-28 RX ADMIN — KETOROLAC TROMETHAMINE 30 MG: 30 INJECTION, SOLUTION INTRAMUSCULAR; INTRAVENOUS at 06:30

## 2021-07-28 RX ADMIN — OXYCODONE HYDROCHLORIDE AND ACETAMINOPHEN 2 TABLET: 5; 325 TABLET ORAL at 08:57

## 2021-07-28 ASSESSMENT — PAIN SCALES - GENERAL
PAINLEVEL_OUTOF10: 10

## 2021-07-28 NOTE — ED PROVIDER NOTES
perfusion. Capillary refill <2 seconds. GASTROINTESTINAL: soft, morbidly obese, non-tender, non-distended, no palpable masses, no rebound or guarding   GENITOURINARY: No costovertebral angle tenderness to palpation  MUSCULOSKELETAL: There is lumbar paraspinal muscle tenderness to palpation with increased pain over the right low back with spasm, no midline spinal tenderness, step off or deformity. Extremities are otherwise nontender to palpation and nonerythematous. Compartments soft. No peripheral edema. Negative straight leg raise. NEUROLOGIC: alert and oriented x 3, GCS 15, normal mentation and speech. Moves all extremities x 4 without motor or sensory deficit, normal perirectal tone and sensation, downgoing Babinski's, normal proprioception, normal DTRs, DP/PT pulses 2+/4 and equal bilaterally  PSYCHIATRIC: normal mood and affect, thought process is clear and linear    DIAGNOSTIC RESULTS     EKG:  None    RADIOLOGY:   CT lumbar spine pending      LABS:  No results found for this visit on 07/28/21. EMERGENCY DEPARTMENT COURSE:        The patient was given the following medications:  Orders Placed This Encounter   Medications    ketorolac (TORADOL) injection 30 mg    orphenadrine (NORFLEX) injection 60 mg    lidocaine 4 % external patch 1 patch        Vitals:    Vitals:    07/28/21 0618   BP: 125/79   Pulse: 78   Resp: 18   Temp: 98.3 °F (36.8 °C)   TempSrc: Oral   SpO2: 95%   Weight: 136.1 kg (300 lb)   Height: 6' 3\" (1.905 m)     -------------------------  BP: 125/79, Temp: 98.3 °F (36.8 °C), Pulse: 78, Resp: 18    CONSULTS:  None    CRITICAL CARE:   None    PROCEDURES:  None    DIAGNOSIS/ MDM:   Ashley Deng is a 49 y.o. male who presents with acute on chronic low back pain. Vital signs are stable. He has lumbar paraspinal muscle tenderness to palpation. He is neurovascularly intact. The patient has not had any recent imaging of his back. CT lumbar spine has been ordered.   He was ordered Toradol, Norflex and Lidoderm patch for pain. I have low suspicion for cauda equina. The patient was signed out to Dr. Golden Bersntein.         FINAL IMPRESSION    Pending    DISPOSITION/PLAN   DISPOSITION  Pending          (Please note that portions of this note were completed with a voice recognitionprogram.  Efforts were made to edit the dictations but occasionally words are mis-transcribed.)    Jm Argueta DO DO  Emergency Physician Attending         Jm Argueta DO  07/28/21 5043

## 2021-07-28 NOTE — TELEPHONE ENCOUNTER
Nurse Access contacted Lorene Hensley ([facility]) office to schedule ED f/u appt. Spoke with office, pt has appt scheduled for next week.

## 2021-07-28 NOTE — ED NOTES
Report from [facility]. Patient returned from xray. Reports pain is still the same. New physician coming on staff.      Latesha Salinas RN  07/28/21 4350

## 2021-07-28 NOTE — ED PROVIDER NOTES
ATTENDING  ADDENDUM     Care of this patient was assumed from Dr. Austin Norris. The patient was seen for Leg Pain (Pt c/o right leg pain)  . The patient's initial evaluation and plan have been discussed with the prior provider who initially evaluated the patient. Nursing Notes, Past Medical Hx, Past Surgical Hx, Social Hx, Allergies, and Family Hx were all reviewed. ED COURSE      The patient was given the following medications:  Orders Placed This Encounter   Medications    ketorolac (TORADOL) injection 30 mg    orphenadrine (NORFLEX) injection 60 mg    lidocaine 4 % external patch 1 patch    morphine (PF) injection 4 mg       RECENT VITALS:   Temp: 98.3 °F (36.8 °C), Pulse: 66, Resp: 18, BP: 128/75    MEDICAL DECISION MAKING       72-year-old male presents complaining of low back pain. No neurologic deficits on exam. CT of the lumbar spine shows no acute bony abnormality. He does have borderline canal stenosis secondary to osteophyte complexes at L5-S1. Patient reports that his pain was not at all controlled by the Toradol and Norflex that he received on arrival. I will give the patient additional medications for pain. Plan for discharge home with close follow-up with the primary care doctor. CT LUMBAR SPINE WO CONTRAST    Result Date: 7/28/2021  EXAMINATION: CT OF THE LUMBAR SPINE WITHOUT CONTRAST  7/28/2021 TECHNIQUE: CT of the lumbar spine was performed without the administration of intravenous contrast. Multiplanar reformatted images are provided for review. Dose modulation, iterative reconstruction, and/or weight based adjustment of the mA/kV was utilized to reduce the radiation dose to as low as reasonably achievable.  COMPARISON: None HISTORY: ORDERING SYSTEM PROVIDED HISTORY: PAIN TECHNOLOGIST PROVIDED HISTORY: acute on chronic low back pain Decision Support Exception - unselect if not a suspected or confirmed emergency medical condition->Emergency Medical Condition (MA) Reason for Exam: low back pain Acuity: Chronic Type of Exam: Ongoing FINDINGS: BONES/ALIGNMENT: There is normal alignment of the spine. The vertebral body heights are maintained. No osseous destructive lesion is seen. DEGENERATIVE CHANGES: L5/S1: Moderate disc height loss is noted with vacuum disc phenomenon in association with posterior disc osteophyte complex which indents the ventral thecal sac narrowing the midline AP thecal sac diameter to 10 mm consistent with borderline central canal stenosis. Disc osteophyte complex encroaches upon and causes moderate right and mild left neural foraminal stenosis. Otherwise, multilevel mild spondylosis is present within the lumbar spine without further significant evidence of central canal stenosis/compromise. SOFT TISSUES/RETROPERITONEUM: No paraspinal mass is seen. 1. No acute abnormality of the lumbar spine. 2. L5/S1 borderline central canal stenosis secondary to encroachment by posterior disc osteophyte complex. 3. L5/S1 moderate right and mild left neural foraminal stenosis secondary to encroachment by the disc osteophyte complex. The patient presents with low back pain without signs of spinal cord compression, cauda equina syndrome, infection, aneurysm or other serious etiology. The patient is neurologically intact and appears stable for discharge. No skin lesions were seen. The pulses are 2/4 bilaterally. The motor is 5/5 bilaterally. The sensation is intact. Given the extremely low risk of these diagnoses further testing and evaluation for these possibilities does not appear to be indicated at this time. The patient was advised to return to the Emergency Department for worsening pain, numbness, weakness, difficulty with urination or defecation, difficulty with ambulation, fever, abdominal pain, coolness or color change to the extremity.       The patient understands that at this time there is no evidence for a more malignant underlying process, but the patient also understands that early in the process of an illness or injury, an emergency department workup can be falsely reassuring. Routine discharge counseling was given, and the patient understands that worsening, changing or persistent symptoms should prompt an immediate call or follow up with their primary physician or return to the emergency department. The importance of appropriate follow up was also discussed. I have reviewed the disposition diagnosis with the patient and or their family/guardian. I have answered their questions and given discharge instructions. They voiced understanding of these instructions and did not have any further questions or complaints.               Impression: low back pain    Shorty Judd MD  Emergency Medicine Attending        Shorty Judd MD  07/28/21 [id number]

## 2021-08-01 NOTE — PROGRESS NOTES
Subjective:      Patient ID: Adri Hannah is a 49 y.o. male. HPI  Here for post hospital follow-up. Patient with history of COPD, morbid obesity and ROSEY. Patient was admitted 7/10/2021 through 7/13/2021. Presented to the hospital with shortness of breath was on BiPAP per EMS arrival.  Was admitted for the management of his COPD exacerbation. Apparently overnight he was very short of breath on his home PAP unit. ED chest x-ray showed cardiomegaly with resolving central pulmonary chest congestion and slowly revolving bibasilar atelectasis/infiltrate. Patient was treated with IV Solu-Medrol. Patient was discharged home with instructions to follow-up with pulmonary. 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 He is a former smoker who quit in January 2021, noted that he previously smoked 1 pack/day for approximately 40 years. At his heaviest he was smoking almost 3 packs daily. Patient states that he had his original sleep study done at [place] [id number]. Unclear on dates that he had study testing done. His DME company is [employer].  He apparently was discharged with oxygen 1 L/min bleed into his BiPAP. Patient states that he has been using his BiPAP with the oxygen bleed in. He continues to report shortness of breath, mild intermittent cough generally nonproductive when he is able to expectorate sputum it is clear. Notes that his breathing is worse with humidity. Using a rolling walker for mobility. Lengthy discussion with patient and his family member that his underlying shortness of breath is likely multifactorial due to his underlying lung function issues, his obesity, deconditioning, probable diastolic dysfunction. He currently is on Symbicort and Spiriva Respimat and has been compliant with medications. Rarely uses his albuterol HFA. Patient has not yet received his Covid vaccine.   Discussed at length with patient and his family member, encourage patient to obtain. 6-minute walk ordered today however patient is unable to complete due to pain in his legs. Discussed with respiratory therapy and we will order a at home evaluation and order will be sent over to infant monitoring to complete. Also discussed with patient that he will need to have a pulmonary function test completed, order is placed in chart. Medications:   Albuterol neb:  Symbicort 160-4.5 mcg:  Spiriva 2.5 mcg  Oxygen 1 LPM at HS- Using  Eliquis 5 mg twice daily:    PRIOR WORKUP:  PFT:  None on chart    CT Imaging:  CT chest 5/9/2021: Negative for pulmonary emboli. Pulmonary arterial opacification is poor difficult to exclude pulmonary emboli beyond the proximalmost branches of the right and left pulmonary arteries. No evidence of mediastinal lymphadenopathy. Lungs are noted for mild atelectasis at the posterior lateral right base. Otherwise clear. No focal consolidation or pulmonary edema. No pleural effusion or pneumothorax. Sleep Study:  None on chart    Laboratory Evaluation:    Immunizations: There is no immunization history on file for this patient. Sleep Medicine 8/3/2021   Sitting and reading 1   Watching TV 1   Sitting, inactive in a public place (e.g. a theatre or a meeting) 0   As a passenger in a car for an hour without a break 2   Lying down to rest in the afternoon when circumstances permit 2   Sitting and talking to someone 0   Sitting quietly after a lunch without alcohol 1   In a car, while stopped for a few minutes in traffic 0   Total score 7       /79 (Site: Right Upper Arm, Position: Sitting, Cuff Size: Large Adult)   Pulse 94   Temp 98.9 °F (37.2 °C) (Temporal)   Resp 20   Ht 6' 3\" (1.905 m)   Wt (!) 366 lb 3.2 oz (166.1 kg)   SpO2 96% Comment: room air at rest  BMI 45.77 kg/m²     Past Medical History:   Diagnosis Date    COPD [facility]chronic obstructive pulmonary disease) ([facility] [facility]     Diabetes mellitus ([facility] [address] [facility]     ROSEY on CPAP      History reviewed.  No Exertional shortness of breath, shortness of breath at rest.   Cardiovascular:        Lower extremity swelling. Gastrointestinal: Negative. Endocrine: Negative. Genitourinary: Negative. Musculoskeletal:        Decreased mobility due to leg pain. Uses a rolling walker for ambulation. Skin: Negative. Allergic/Immunologic: Negative. Neurological: Negative. Hematological: Negative. Psychiatric/Behavioral: Negative. Objective:       Physical Exam  General appearance - overweight and chronically ill appearing  Mental status - alert, oriented to person, place, and time  Eyes - pupils equal and reactive, extraocular eye movements intact  Ears - not examined  Nose - normal and patent, no erythema, discharge or polyps  Mouth - mucous membranes moist, pharynx normal without lesions  Neck - supple, no significant adenopathy  Chest - clear to auscultation, no wheezes, rales or rhonchi, symmetric air entry, increased AP diameter, decreased thoracic expansion and excursion, prolonged expiratory phase. No witnessed cough. No appreciated adventitious sounds.   Heart -normal rate, regular rhythm, normal S1, S2, no murmurs, rubs, clicks or gallops  Abdomen - soft, nontender, nondistended, no masses or organomegaly  Neuro- alert, oriented, normal speech, no focal findings or movement disorder noted}  Extremities - pedal edema 2 +, intact peripheral pulses  Skin - normal coloration and turgor, no rashes, no suspicious skin lesions noted     Wt Readings from Last 3 Encounters:   08/03/21 (!) 366 lb 3.2 oz (166.1 kg)   07/28/21 300 lb (136.1 kg)   07/13/21 (!) 361 lb (163.7 kg)       Results for orders placed or performed during the hospital encounter of 07/10/21   Comprehensive Metabolic Panel   Result Value Ref Range    Glucose 154 (H) 70 - 99 mg/dL    BUN 15 6 - 20 mg/dL    CREATININE 1.03 0.70 - 1.20 mg/dL    Bun/Cre Ratio NOT REPORTED 9 - 20    Calcium 9.3 8.6 - 10.4 mg/dL    Sodium 141 135 - 144 mmol/L    Potassium 4.3 3.7 - 5.3 mmol/L    Chloride 105 98 - 107 mmol/L    CO2 24 20 - 31 mmol/L    Anion Gap 12 9 - 17 mmol/L    Alkaline Phosphatase 150 (H) 40 - 129 U/L    ALT 11 5 - 41 U/L    AST 12 <40 U/L    Total Bilirubin 0.39 0.3 - 1.2 mg/dL    Total Protein 6.9 6.4 - 8.3 g/dL    Albumin 3.7 3.5 - 5.2 g/dL    Albumin/Globulin Ratio 1.2 1.0 - 2.5    GFR Non-African American >60 >60 mL/min    GFR African American >60 >60 mL/min    GFR Comment          GFR Staging NOT REPORTED    CBC Auto Differential   Result Value Ref Range    WBC 8.3 3.5 - 11.0 k/uL    RBC 4.71 4.5 - 5.9 m/uL    Hemoglobin 14.1 13.5 - 17.5 g/dL    Hematocrit 43.2 41 - 53 %    MCV 91.8 80 - 100 fL    MCH 29.9 26 - 34 pg    MCHC 32.6 31 - 37 g/dL    RDW 16.0 (H) 12.5 - 15.4 %    Platelets 077 534 - 710 k/uL    MPV 8.9 6.0 - 12.0 fL    NRBC Automated NOT REPORTED per 100 WBC    Differential Type NOT REPORTED     Seg Neutrophils 59 36 - 66 %    Lymphocytes 26 24 - 44 %    Monocytes 8 2 - 11 %    Eosinophils % 6 (H) 1 - 4 %    Basophils 1 0 - 2 %    Immature Granulocytes NOT REPORTED 0 %    Segs Absolute 4.90 1.8 - 7.7 k/uL    Absolute Lymph # 2.10 1.0 - 4.8 k/uL    Absolute Mono # 0.70 0.1 - 1.2 k/uL    Absolute Eos # 0.50 (H) 0.0 - 0.4 k/uL    Basophils Absolute 0.10 0.0 - 0.2 k/uL    Absolute Immature Granulocyte NOT REPORTED 0.00 - 0.30 k/uL    WBC Morphology NOT REPORTED     RBC Morphology NOT REPORTED     Platelet Estimate NOT REPORTED    Troponin   Result Value Ref Range    Troponin, High Sensitivity 12 0 - 22 ng/L    Troponin T NOT REPORTED <0.03 ng/mL    Troponin Interp NOT REPORTED    Troponin   Result Value Ref Range    Troponin, High Sensitivity 11 0 - 22 ng/L    Troponin T NOT REPORTED <0.03 ng/mL    Troponin Interp NOT REPORTED    Lipase   Result Value Ref Range    Lipase 22 13 - 60 U/L   Lactate, Sepsis   Result Value Ref Range    Lactic Acid, Sepsis 1.6 0.5 - 1.9 mmol/L    Lactic Acid, Sepsis, Whole Blood NOT REPORTED 0.5 - 1.9 mmol/L   Lactate, Sepsis   Result Value Ref Range    Lactic Acid, Sepsis 1.3 0.5 - 1.9 mmol/L    Lactic Acid, Sepsis, Whole Blood NOT REPORTED 0.5 - 1.9 mmol/L   Protime-INR   Result Value Ref Range    Protime 10.1 9.4 - 12.6 sec    INR 1.0    APTT   Result Value Ref Range    PTT 26.2 21.3 - 31.3 sec   Brain Natriuretic Peptide   Result Value Ref Range    Pro-BNP 75 <300 pg/mL    BNP Interpretation Pro-BNP Reference Range:    Hemoglobin A1C   Result Value Ref Range    Hemoglobin A1C 6.2 (H) 4.0 - 6.0 %    Estimated Avg Glucose 131 mg/dL   HEPATITIS PANEL, ACUTE   Result Value Ref Range    Hepatitis B Surface Ag DISREGARD RESULTS. CLERICAL ERROR. (A) NONREACTIVE    Hepatitis C Ab DISREGARD RESULTS. CLERICAL ERROR. (A) NONREACTIVE    Hep B Core Ab, IgM DISREGARD RESULTS. CLERICAL ERROR. (A) NONREACTIVE    Hep A IgM DISREGARD RESULTS. CLERICAL ERROR. (A) NONREACTIVE   HIV Screen   Result Value Ref Range    HIV Ag/Ab DISREGARD RESULTS.   CLERICAL ERROR. (A) NONREACTIVE   Basic Metabolic Panel w/ Reflex to MG   Result Value Ref Range    Glucose 252 (H) 70 - 99 mg/dL    BUN 21 (H) 6 - 20 mg/dL    CREATININE 0.85 0.70 - 1.20 mg/dL    Bun/Cre Ratio NOT REPORTED 9 - 20    Calcium 9.1 8.6 - 10.4 mg/dL    Sodium 137 135 - 144 mmol/L    Potassium 4.5 3.7 - 5.3 mmol/L    Chloride 105 98 - 107 mmol/L    CO2 23 20 - 31 mmol/L    Anion Gap 9 9 - 17 mmol/L    GFR Non-African American >60 >60 mL/min    GFR African American >60 >60 mL/min    GFR Comment          GFR Staging NOT REPORTED    CBC   Result Value Ref Range    WBC 13.0 (H) 3.5 - 11.0 k/uL    RBC 4.61 4.5 - 5.9 m/uL    Hemoglobin 13.7 13.5 - 17.5 g/dL    Hematocrit 43.1 41 - 53 %    MCV 93.6 80 - 100 fL    MCH 29.8 26 - 34 pg    MCHC 31.8 31 - 37 g/dL    RDW 15.8 (H) 12.5 - 15.4 %    Platelets 723 117 - 790 k/uL    MPV 9.2 6.0 - 12.0 fL    NRBC Automated NOT REPORTED per 100 WBC   Basic Metabolic Panel w/ Reflex to MG   Result Value Ref Range    Glucose 266 (H) 70 - 99 mg/dL    BUN 31 (H) 6 - 20 mg/dL    CREATININE 1.02 0.70 - 1.20 mg/dL    Bun/Cre Ratio NOT REPORTED 9 - 20    Calcium 9.2 8.6 - 10.4 mg/dL    Sodium 136 135 - 144 mmol/L    Potassium 4.3 3.7 - 5.3 mmol/L    Chloride 101 98 - 107 mmol/L    CO2 21 20 - 31 mmol/L    Anion Gap 14 9 - 17 mmol/L    GFR Non-African American >60 >60 mL/min    GFR African American >60 >60 mL/min    GFR Comment          GFR Staging NOT REPORTED    CBC   Result Value Ref Range    WBC 13.1 (H) 3.5 - 11.0 k/uL    RBC 4.60 4.5 - 5.9 m/uL    Hemoglobin 13.6 13.5 - 17.5 g/dL    Hematocrit 42.2 41 - 53 %    MCV 91.7 80 - 100 fL    MCH 29.7 26 - 34 pg    MCHC 32.4 31 - 37 g/dL    RDW 16.1 (H) 12.5 - 15.4 %    Platelets 960 018 - 581 k/uL    MPV 10.0 6.0 - 12.0 fL    NRBC Automated NOT REPORTED per 100 WBC   POC Glucose Fingerstick   Result Value Ref Range    POC Glucose 203 (H) 75 - 110 mg/dL   POC Glucose Fingerstick   Result Value Ref Range    POC Glucose 227 (H) 75 - 110 mg/dL   POC Glucose Fingerstick   Result Value Ref Range    POC Glucose 227 (H) 75 - 110 mg/dL   POC Glucose Fingerstick   Result Value Ref Range    POC Glucose 273 (H) 75 - 110 mg/dL   POC Glucose Fingerstick   Result Value Ref Range    POC Glucose 256 (H) 75 - 110 mg/dL   POC Glucose Fingerstick   Result Value Ref Range    POC Glucose 229 (H) 75 - 110 mg/dL   POC Glucose Fingerstick   Result Value Ref Range    POC Glucose 334 (H) 75 - 110 mg/dL   POC Glucose Fingerstick   Result Value Ref Range    POC Glucose 318 (H) 75 - 110 mg/dL   POC Glucose Fingerstick   Result Value Ref Range    POC Glucose 220 (H) 75 - 110 mg/dL   POC Glucose Fingerstick   Result Value Ref Range    POC Glucose 188 (H) 75 - 110 mg/dL   POC Glucose Fingerstick   Result Value Ref Range    POC Glucose 285 (H) 75 - 110 mg/dL   EKG 12 Lead   Result Value Ref Range    Ventricular Rate 83 BPM    Atrial Rate 83 BPM    P-R Interval 176 ms    QRS Duration 96 ms    Q-T Interval 370 ms    QTc Calculation (Bazett) 434 ms    P Axis 57 degrees    R Axis -15 degrees    T Axis 61 degrees       CT LUMBAR SPINE WO CONTRAST    Result Date: 7/28/2021  EXAMINATION: CT OF THE LUMBAR SPINE WITHOUT CONTRAST  7/28/2021 TECHNIQUE: CT of the lumbar spine was performed without the administration of intravenous contrast. Multiplanar reformatted images are provided for review. Dose modulation, iterative reconstruction, and/or weight based adjustment of the mA/kV was utilized to reduce the radiation dose to as low as reasonably achievable. COMPARISON: None HISTORY: ORDERING SYSTEM PROVIDED HISTORY: PAIN TECHNOLOGIST PROVIDED HISTORY: acute on chronic low back pain Decision Support Exception - unselect if not a suspected or confirmed emergency medical condition->Emergency Medical Condition (MA) Reason for Exam: low back pain Acuity: Chronic Type of Exam: Ongoing FINDINGS: BONES/ALIGNMENT: There is normal alignment of the spine. The vertebral body heights are maintained. No osseous destructive lesion is seen. DEGENERATIVE CHANGES: L5/S1: Moderate disc height loss is noted with vacuum disc phenomenon in association with posterior disc osteophyte complex which indents the ventral thecal sac narrowing the midline AP thecal sac diameter to 10 mm consistent with borderline central canal stenosis. Disc osteophyte complex encroaches upon and causes moderate right and mild left neural foraminal stenosis. Otherwise, multilevel mild spondylosis is present within the lumbar spine without further significant evidence of central canal stenosis/compromise. SOFT TISSUES/RETROPERITONEUM: No paraspinal mass is seen. 1. No acute abnormality of the lumbar spine. 2. L5/S1 borderline central canal stenosis secondary to encroachment by posterior disc osteophyte complex. 3. L5/S1 moderate right and mild left neural foraminal stenosis secondary to encroachment by the disc osteophyte complex.      XR CHEST PORTABLE    Result Date: 7/10/2021  EXAMINATION: ONE XRAY VIEW OF THE CHEST 7/10/2021 11:03 pm COMPARISON: 06/10/2020 HISTORY: ORDERING SYSTEM PROVIDED HISTORY: Cough TECHNOLOGIST PROVIDED HISTORY: Cough Reason for Exam: sob Acuity: Acute Type of Exam: Initial FINDINGS: The heart is mildly enlarged but unchanged. The pulmonary vessels are less prominent centrally. The lungs are hypoinflated with hazy bibasilar opacities which are less prominent. No effusion is seen. Stable mild cardiomegaly with resolving central pulmonary congestion Slowly resolving bibasilar atelectasis or infiltrates. Assessment:      1. COVID-19 vaccination refused    2. Chronic obstructive pulmonary disease, unspecified COPD type ([facility] [address]    3. Acute respiratory failure with hypoxemia (HCC)    4. ROSEY treated with BiPAP    5. Morbid obesity with body mass index (BMI) of 40.0 to 49.9 (HCC)          Plan:      1. Medications reviewed, continue Symbicort and Spiriva. Has orders for as needed albuterol. 2. Educated and clarified the medication use. 3. Recommend flu vaccination in the fall annually. Due in fall  4. Patient is up-to-date with vaccinations from pulmonary perspective. 5. Maintain an active lifestyle. 6. Patient's questions were answered to his satisfaction. 7. Ongoing smoking cessation advised. 8. Patient is on supplemental nocturnal oxygen with his BiPAP. 6-minute walk test ordered today unable to complete due to pain in his legs. Order sent over to infant monitoring for a home study. 9. Pulmonary function tests ordered  10. CT scan of the chest was reviewed. Patient remains eligible for CT lung screening due to his smoking history. He will be eligible in May 2022. 11.  Compliance data not available for my review. Per patient report they use the machine faithfully every night, and report refreshing and restorative sleep without residual daytime fatigue   12. Obtain original sleep study and titration study.   15. We'll see the patient back in 3 months          Electronically signed by DANIEL Monroe - CNP on 8/3/2021 at 12:36 PM

## 2021-08-03 ENCOUNTER — OFFICE VISIT (OUTPATIENT)
Dept: PULMONOLOGY | Age: 56
End: 2021-08-03
Payer: MEDICAID

## 2021-08-03 VITALS
HEIGHT: 75 IN | TEMPERATURE: 98.9 F | DIASTOLIC BLOOD PRESSURE: 79 MMHG | SYSTOLIC BLOOD PRESSURE: 120 MMHG | HEART RATE: 94 BPM | WEIGHT: 315 LBS | OXYGEN SATURATION: 96 % | RESPIRATION RATE: 20 BRPM | BODY MASS INDEX: 39.17 KG/M2

## 2021-08-03 DIAGNOSIS — J96.01 ACUTE RESPIRATORY FAILURE WITH HYPOXEMIA (HCC): ICD-10-CM

## 2021-08-03 DIAGNOSIS — G47.33 OSA TREATED WITH BIPAP: ICD-10-CM

## 2021-08-03 DIAGNOSIS — J44.9 CHRONIC OBSTRUCTIVE PULMONARY DISEASE, UNSPECIFIED COPD TYPE (HCC): ICD-10-CM

## 2021-08-03 DIAGNOSIS — E66.01 MORBID OBESITY WITH BODY MASS INDEX (BMI) OF 40.0 TO 49.9 (HCC): ICD-10-CM

## 2021-08-03 DIAGNOSIS — Z28.21 COVID-19 VACCINATION REFUSED: Primary | ICD-10-CM

## 2021-08-03 PROCEDURE — 99213 OFFICE O/P EST LOW 20 MIN: CPT | Performed by: NURSE PRACTITIONER

## 2021-08-03 RX ORDER — OXYBUTYNIN CHLORIDE 10 MG/1
TABLET, EXTENDED RELEASE ORAL
COMMUNITY
Start: 2021-07-19

## 2021-08-03 RX ORDER — TAMSULOSIN HYDROCHLORIDE 0.4 MG/1
CAPSULE ORAL
COMMUNITY
Start: 2021-07-19

## 2021-08-03 ASSESSMENT — SLEEP AND FATIGUE QUESTIONNAIRES
HOW LIKELY ARE YOU TO NOD OFF OR FALL ASLEEP WHEN YOU ARE A PASSENGER IN A CAR FOR AN HOUR WITHOUT A BREAK: 2
ESS TOTAL SCORE: 7
HOW LIKELY ARE YOU TO NOD OFF OR FALL ASLEEP WHILE LYING DOWN TO REST IN THE AFTERNOON WHEN CIRCUMSTANCES PERMIT: 2
HOW LIKELY ARE YOU TO NOD OFF OR FALL ASLEEP WHILE WATCHING TV: 1
HOW LIKELY ARE YOU TO NOD OFF OR FALL ASLEEP IN A CAR, WHILE STOPPED FOR A FEW MINUTES IN TRAFFIC: 0
HOW LIKELY ARE YOU TO NOD OFF OR FALL ASLEEP WHILE SITTING QUIETLY AFTER LUNCH WITHOUT ALCOHOL: 1
HOW LIKELY ARE YOU TO NOD OFF OR FALL ASLEEP WHILE SITTING AND READING: 1
HOW LIKELY ARE YOU TO NOD OFF OR FALL ASLEEP WHILE SITTING INACTIVE IN A PUBLIC PLACE: 0
HOW LIKELY ARE YOU TO NOD OFF OR FALL ASLEEP WHILE SITTING AND TALKING TO SOMEONE: 0

## 2021-08-03 ASSESSMENT — ENCOUNTER SYMPTOMS
ALLERGIC/IMMUNOLOGIC NEGATIVE: 1
GASTROINTESTINAL NEGATIVE: 1
EYES NEGATIVE: 1

## 2021-08-03 NOTE — PROGRESS NOTES
Sending order to Infant monitoring to perform walk test. Patient stated he could not walk legs were in pain.

## 2021-08-04 NOTE — PATIENT INSTRUCTIONS
Walk test and pft printed after visit lmom 8-4-21 @ 8:40am to call the office back to schedule for covid test and pft before the follow up.  MARJORIE

## 2021-08-08 ENCOUNTER — APPOINTMENT (OUTPATIENT)
Dept: CT IMAGING | Age: 56
End: 2021-08-08
Payer: MEDICAID

## 2021-08-08 ENCOUNTER — HOSPITAL ENCOUNTER (EMERGENCY)
Age: 56
Discharge: HOME | End: 2021-08-09
Attending: EMERGENCY MEDICINE
Payer: MEDICAID

## 2021-08-08 VITALS
WEIGHT: 315 LBS | RESPIRATION RATE: 18 BRPM | HEIGHT: 75 IN | DIASTOLIC BLOOD PRESSURE: 94 MMHG | SYSTOLIC BLOOD PRESSURE: 110 MMHG | HEART RATE: 80 BPM | BODY MASS INDEX: 39.17 KG/M2 | TEMPERATURE: 98.2 F | OXYGEN SATURATION: 99 %

## 2021-08-08 DIAGNOSIS — J18.9 PNEUMONIA DUE TO INFECTIOUS ORGANISM, UNSPECIFIED LATERALITY, UNSPECIFIED PART OF LUNG: ICD-10-CM

## 2021-08-08 DIAGNOSIS — I87.2 CHRONIC VENOUS STASIS DERMATITIS OF BOTH LOWER EXTREMITIES: ICD-10-CM

## 2021-08-08 DIAGNOSIS — J44.1 COPD EXACERBATION (HCC): Primary | ICD-10-CM

## 2021-08-08 LAB
ABSOLUTE EOS #: 0.6 K/UL (ref 0–0.4)
ABSOLUTE IMMATURE GRANULOCYTE: ABNORMAL K/UL (ref 0–0.3)
ABSOLUTE LYMPH #: 1.9 K/UL (ref 1–4.8)
ABSOLUTE MONO #: 0.8 K/UL (ref 0.1–1.2)
ALBUMIN SERPL-MCNC: 3.7 G/DL (ref 3.5–5.2)
ALBUMIN/GLOBULIN RATIO: 1.2 (ref 1–2.5)
ALP BLD-CCNC: 143 U/L (ref 40–129)
ALT SERPL-CCNC: 15 U/L (ref 5–41)
ANION GAP SERPL CALCULATED.3IONS-SCNC: 8 MMOL/L (ref 9–17)
AST SERPL-CCNC: 13 U/L
BASOPHILS # BLD: 1 % (ref 0–2)
BASOPHILS ABSOLUTE: 0.1 K/UL (ref 0–0.2)
BILIRUB SERPL-MCNC: 0.67 MG/DL (ref 0.3–1.2)
BNP INTERPRETATION: NORMAL
BUN BLDV-MCNC: 14 MG/DL (ref 6–20)
BUN/CREAT BLD: ABNORMAL (ref 9–20)
CALCIUM SERPL-MCNC: 9.3 MG/DL (ref 8.6–10.4)
CHLORIDE BLD-SCNC: 102 MMOL/L (ref 98–107)
CO2: 27 MMOL/L (ref 20–31)
CREAT SERPL-MCNC: 0.96 MG/DL (ref 0.7–1.2)
D-DIMER QUANTITATIVE: 0.52 MG/L FEU
DIFFERENTIAL TYPE: ABNORMAL
EOSINOPHILS RELATIVE PERCENT: 8 % (ref 1–4)
GFR AFRICAN AMERICAN: >60 ML/MIN
GFR NON-AFRICAN AMERICAN: >60 ML/MIN
GFR SERPL CREATININE-BSD FRML MDRD: ABNORMAL ML/MIN/{1.73_M2}
GFR SERPL CREATININE-BSD FRML MDRD: ABNORMAL ML/MIN/{1.73_M2}
GLUCOSE BLD-MCNC: 126 MG/DL (ref 70–99)
HCT VFR BLD CALC: 43.2 % (ref 41–53)
HEMOGLOBIN: 14.1 G/DL (ref 13.5–17.5)
IMMATURE GRANULOCYTES: ABNORMAL %
LACTIC ACID, SEPSIS WHOLE BLOOD: NORMAL MMOL/L (ref 0.5–1.9)
LACTIC ACID, SEPSIS: 1.4 MMOL/L (ref 0.5–1.9)
LYMPHOCYTES # BLD: 24 % (ref 24–44)
MCH RBC QN AUTO: 30.2 PG (ref 26–34)
MCHC RBC AUTO-ENTMCNC: 32.6 G/DL (ref 31–37)
MCV RBC AUTO: 92.5 FL (ref 80–100)
MONOCYTES # BLD: 9 % (ref 2–11)
NRBC AUTOMATED: ABNORMAL PER 100 WBC
PDW BLD-RTO: 16.2 % (ref 12.5–15.4)
PLATELET # BLD: 229 K/UL (ref 140–450)
PLATELET ESTIMATE: ABNORMAL
PMV BLD AUTO: 9.1 FL (ref 6–12)
POTASSIUM SERPL-SCNC: 4.1 MMOL/L (ref 3.7–5.3)
PRO-BNP: 39 PG/ML
RBC # BLD: 4.67 M/UL (ref 4.5–5.9)
RBC # BLD: ABNORMAL 10*6/UL
SEG NEUTROPHILS: 58 % (ref 36–66)
SEGMENTED NEUTROPHILS ABSOLUTE COUNT: 4.6 K/UL (ref 1.8–7.7)
SODIUM BLD-SCNC: 137 MMOL/L (ref 135–144)
TOTAL PROTEIN: 6.9 G/DL (ref 6.4–8.3)
TROPONIN INTERP: NORMAL
TROPONIN INTERP: NORMAL
TROPONIN T: NORMAL NG/ML
TROPONIN T: NORMAL NG/ML
TROPONIN, HIGH SENSITIVITY: 13 NG/L (ref 0–22)
TROPONIN, HIGH SENSITIVITY: 14 NG/L (ref 0–22)
WBC # BLD: 8 K/UL (ref 3.5–11)
WBC # BLD: ABNORMAL 10*3/UL

## 2021-08-08 PROCEDURE — 84484 ASSAY OF TROPONIN QUANT: CPT

## 2021-08-08 PROCEDURE — 71260 CT THORAX DX C+: CPT

## 2021-08-08 PROCEDURE — 96375 TX/PRO/DX INJ NEW DRUG ADDON: CPT

## 2021-08-08 PROCEDURE — 6370000000 HC RX 637 (ALT 250 FOR IP): Performed by: EMERGENCY MEDICINE

## 2021-08-08 PROCEDURE — 6370000000 HC RX 637 (ALT 250 FOR IP)

## 2021-08-08 PROCEDURE — 2580000003 HC RX 258: Performed by: EMERGENCY MEDICINE

## 2021-08-08 PROCEDURE — 93005 ELECTROCARDIOGRAM TRACING: CPT | Performed by: EMERGENCY MEDICINE

## 2021-08-08 PROCEDURE — 36415 COLL VENOUS BLD VENIPUNCTURE: CPT

## 2021-08-08 PROCEDURE — 6360000004 HC RX CONTRAST MEDICATION: Performed by: EMERGENCY MEDICINE

## 2021-08-08 PROCEDURE — 96365 THER/PROPH/DIAG IV INF INIT: CPT

## 2021-08-08 PROCEDURE — 6360000002 HC RX W HCPCS: Performed by: EMERGENCY MEDICINE

## 2021-08-08 PROCEDURE — 80053 COMPREHEN METABOLIC PANEL: CPT

## 2021-08-08 PROCEDURE — 83605 ASSAY OF LACTIC ACID: CPT

## 2021-08-08 PROCEDURE — 99285 EMERGENCY DEPT VISIT HI MDM: CPT

## 2021-08-08 PROCEDURE — 83880 ASSAY OF NATRIURETIC PEPTIDE: CPT

## 2021-08-08 PROCEDURE — 85379 FIBRIN DEGRADATION QUANT: CPT

## 2021-08-08 PROCEDURE — 85025 COMPLETE CBC W/AUTO DIFF WBC: CPT

## 2021-08-08 RX ORDER — SODIUM CHLORIDE 0.9 % (FLUSH) 0.9 %
10 SYRINGE (ML) INJECTION PRN
Status: DISCONTINUED | OUTPATIENT
Start: 2021-08-08 | End: 2021-08-09 | Stop reason: HOSPADM

## 2021-08-08 RX ORDER — IPRATROPIUM BROMIDE AND ALBUTEROL SULFATE 2.5; .5 MG/3ML; MG/3ML
SOLUTION RESPIRATORY (INHALATION)
Status: COMPLETED
Start: 2021-08-08 | End: 2021-08-08

## 2021-08-08 RX ORDER — METHYLPREDNISOLONE SODIUM SUCCINATE 125 MG/2ML
125 INJECTION, POWDER, LYOPHILIZED, FOR SOLUTION INTRAMUSCULAR; INTRAVENOUS ONCE
Status: COMPLETED | OUTPATIENT
Start: 2021-08-08 | End: 2021-08-08

## 2021-08-08 RX ORDER — IPRATROPIUM BROMIDE AND ALBUTEROL SULFATE 2.5; .5 MG/3ML; MG/3ML
1 SOLUTION RESPIRATORY (INHALATION)
Status: DISCONTINUED | OUTPATIENT
Start: 2021-08-09 | End: 2021-08-09 | Stop reason: HOSPADM

## 2021-08-08 RX ORDER — 0.9 % SODIUM CHLORIDE 0.9 %
80 INTRAVENOUS SOLUTION INTRAVENOUS ONCE
Status: COMPLETED | OUTPATIENT
Start: 2021-08-08 | End: 2021-08-08

## 2021-08-08 RX ORDER — AZITHROMYCIN 250 MG/1
500 TABLET, FILM COATED ORAL ONCE
Status: COMPLETED | OUTPATIENT
Start: 2021-08-08 | End: 2021-08-08

## 2021-08-08 RX ORDER — OXYCODONE HYDROCHLORIDE AND ACETAMINOPHEN 5; 325 MG/1; MG/1
2 TABLET ORAL ONCE
Status: COMPLETED | OUTPATIENT
Start: 2021-08-08 | End: 2021-08-08

## 2021-08-08 RX ORDER — MORPHINE SULFATE 4 MG/ML
4 INJECTION, SOLUTION INTRAMUSCULAR; INTRAVENOUS ONCE
Status: COMPLETED | OUTPATIENT
Start: 2021-08-08 | End: 2021-08-08

## 2021-08-08 RX ORDER — LEVOFLOXACIN 750 MG/1
750 TABLET ORAL DAILY
Qty: 10 TABLET | Refills: 0 | Status: SHIPPED | OUTPATIENT
Start: 2021-08-08 | End: 2021-08-08 | Stop reason: SDUPTHER

## 2021-08-08 RX ORDER — LEVOFLOXACIN 750 MG/1
750 TABLET ORAL DAILY
Qty: 10 TABLET | Refills: 0 | Status: SHIPPED | OUTPATIENT
Start: 2021-08-08 | End: 2021-08-18

## 2021-08-08 RX ADMIN — SODIUM CHLORIDE, PRESERVATIVE FREE 10 ML: 5 INJECTION INTRAVENOUS at 21:54

## 2021-08-08 RX ADMIN — AZITHROMYCIN MONOHYDRATE 500 MG: 250 TABLET ORAL at 23:22

## 2021-08-08 RX ADMIN — METHYLPREDNISOLONE SODIUM SUCCINATE 125 MG: 125 INJECTION, POWDER, FOR SOLUTION INTRAMUSCULAR; INTRAVENOUS at 21:22

## 2021-08-08 RX ADMIN — CEFTRIAXONE SODIUM 1000 MG: 1 INJECTION, POWDER, FOR SOLUTION INTRAMUSCULAR; INTRAVENOUS at 23:23

## 2021-08-08 RX ADMIN — IPRATROPIUM BROMIDE AND ALBUTEROL SULFATE 3 ML: .5; 3 SOLUTION RESPIRATORY (INHALATION) at 20:26

## 2021-08-08 RX ADMIN — SODIUM CHLORIDE 80 ML: 9 INJECTION, SOLUTION INTRAVENOUS at 21:54

## 2021-08-08 RX ADMIN — MORPHINE SULFATE 4 MG: 4 INJECTION INTRAVENOUS at 21:21

## 2021-08-08 RX ADMIN — IPRATROPIUM BROMIDE AND ALBUTEROL SULFATE 3 ML: .5; 3 SOLUTION RESPIRATORY (INHALATION) at 20:34

## 2021-08-08 RX ADMIN — OXYCODONE HYDROCHLORIDE AND ACETAMINOPHEN 2 TABLET: 5; 325 TABLET ORAL at 23:22

## 2021-08-08 RX ADMIN — IOPAMIDOL 75 ML: 755 INJECTION, SOLUTION INTRAVENOUS at 21:54

## 2021-08-08 ASSESSMENT — PAIN - FUNCTIONAL ASSESSMENT: PAIN_FUNCTIONAL_ASSESSMENT: PREVENTS OR INTERFERES SOME ACTIVE ACTIVITIES AND ADLS

## 2021-08-08 ASSESSMENT — ENCOUNTER SYMPTOMS
EYE DISCHARGE: 0
STRIDOR: 0
EYE PAIN: 0
EYE REDNESS: 0
DIARRHEA: 0
ABDOMINAL PAIN: 0
SORE THROAT: 0
SHORTNESS OF BREATH: 1
NAUSEA: 0
CONSTIPATION: 0
COUGH: 1
WHEEZING: 0
VOMITING: 0
COLOR CHANGE: 0

## 2021-08-08 ASSESSMENT — PAIN DESCRIPTION - FREQUENCY: FREQUENCY: CONTINUOUS

## 2021-08-08 ASSESSMENT — PAIN DESCRIPTION - ORIENTATION
ORIENTATION: RIGHT
ORIENTATION: RIGHT

## 2021-08-08 ASSESSMENT — PAIN DESCRIPTION - DESCRIPTORS: DESCRIPTORS: ACHING

## 2021-08-08 ASSESSMENT — PAIN DESCRIPTION - PAIN TYPE: TYPE: ACUTE PAIN

## 2021-08-08 ASSESSMENT — PAIN SCALES - GENERAL
PAINLEVEL_OUTOF10: 9
PAINLEVEL_OUTOF10: 10
PAINLEVEL_OUTOF10: 6
PAINLEVEL_OUTOF10: 8

## 2021-08-08 ASSESSMENT — PAIN DESCRIPTION - LOCATION
LOCATION: LEG;HIP
LOCATION: LEG

## 2021-08-09 ENCOUNTER — TELEPHONE (OUTPATIENT)
Dept: OTHER | Facility: CLINIC | Age: 56
End: 2021-08-09

## 2021-08-09 LAB
EKG ATRIAL RATE: 82 BPM
EKG P AXIS: 60 DEGREES
EKG P-R INTERVAL: 184 MS
EKG Q-T INTERVAL: 372 MS
EKG QRS DURATION: 90 MS
EKG QTC CALCULATION (BAZETT): 434 MS
EKG R AXIS: -18 DEGREES
EKG T AXIS: 45 DEGREES
EKG VENTRICULAR RATE: 82 BPM

## 2021-08-09 NOTE — TELEPHONE ENCOUNTER
[facility] contacted [facility] to schedule ED f/u appt.  Spoke with Sabino Biggs, she scheduled ppt for Thursday 8-12 @10:45am.

## 2021-08-09 NOTE — ED PROVIDER NOTES
ROSEY on CPAP        SURGICAL HISTORY     History reviewed. No pertinent surgical history. CURRENT MEDICATIONS       Current Discharge Medication List      CONTINUE these medications which have NOT CHANGED    Details   oxybutynin (DITROPAN-XL) 10 MG extended release tablet TAKE 1 TABLET BY MOUTH ONCE DAILY      tamsulosin (FLOMAX) 0.4 MG capsule TAKE 1 CAPSULE BY MOUTH ONCE DAILY      oxyCODONE-acetaminophen (PERCOCET) 5-325 MG per tablet Take 1 tablet by mouth every 8 hours as needed. atorvastatin (LIPITOR) 40 MG tablet Take 1 tablet by mouth nightly  Qty: 30 tablet, Refills: 0      furosemide (LASIX) 40 MG tablet Take 1 tablet by mouth 2 times daily  Qty: 60 tablet, Refills: 1      albuterol (PROVENTIL) (2.5 MG/3ML) 0.083% nebulizer solution USE 1 VIAL IN NEBULIZER EVERY 6 HOURS AS NEEDED FOR WHEEZING FOR SHORTNESS OF BREATH      apixaban (ELIQUIS) 5 MG TABS tablet Take 1 tablet by mouth twice daily      budesonide-formoterol (SYMBICORT) 160-4.5 MCG/ACT AERO Inhale 2 puffs into the lungs 2 times daily      hydrOXYzine (VISTARIL) 25 MG capsule TAKE 1 CAPSULE BY MOUTH EVERY 8 HOURS AS NEEDED FOR ANXIETY      pregabalin (LYRICA) 50 MG capsule Take 50 mg by mouth 3 times daily. traMADol (ULTRAM) 50 MG tablet TAKE 1 TABLET BY MOUTH ONCE DAILY AS NEEDED FOR PAIN      zolpidem (AMBIEN) 10 MG tablet Take 10 mg by mouth nightly as needed. ALLERGIES     Patient has no known allergies. FAMILY HISTORY           Problem Relation Age of Onset    Lung Cancer Mother      Family Status   Relation Name Status    Mother      Father          SOCIAL HISTORY      reports that he quit smoking about 7 months ago. His smoking use included cigarettes. He has a 25.00 pack-year smoking history. He has never used smokeless tobacco. He reports previous alcohol use. He reports previous drug use.     PHYSICAL EXAM    (up to 7 for level 4, 8 or more for level 5)     Physical Exam  Vitals and nursing note reviewed. Constitutional:       General: He is not in acute distress. Appearance: He is well-developed. He is not ill-appearing, toxic-appearing or diaphoretic. HENT:      Head: Normocephalic and atraumatic. Right Ear: External ear normal.      Left Ear: External ear normal.      Nose: Nose normal.      Mouth/Throat:      Mouth: Mucous membranes are moist.   Eyes:      General:         Right eye: No discharge. Left eye: No discharge. Conjunctiva/sclera: Conjunctivae normal.      Pupils: Pupils are equal, round, and reactive to light. Cardiovascular:      Rate and Rhythm: Normal rate and regular rhythm. Heart sounds: Normal heart sounds. No murmur heard. Pulmonary:      Effort: Pulmonary effort is normal. No respiratory distress. Breath sounds: Normal breath sounds. No wheezing or rales. Abdominal:      General: Bowel sounds are normal. There is no distension. Palpations: Abdomen is soft. There is no mass. Tenderness: There is no abdominal tenderness. There is no guarding or rebound. Musculoskeletal:         General: Swelling and tenderness present. No signs of injury. Normal range of motion. Cervical back: Normal range of motion and neck supple. Right lower leg: Edema present. Left lower leg: Edema present. Skin:     General: Skin is warm. Findings: No rash. Neurological:      General: No focal deficit present. Mental Status: He is alert and oriented to person, place, and time. Motor: No abnormal muscle tone.    Psychiatric:         Mood and Affect: Mood normal.         Behavior: Behavior normal.         DIAGNOSTIC RESULTS     EKG: All EKG's are interpreted by the Emergency Department Physician who either signs or Co-signs this chart in the absence of a cardiologist.    EKG Interpretation    Interpreted by me    Rhythm: normal sinus   Rate: normal  Axis: normal  Ectopy: none  Conduction: normal  ST Segments: no acute change  T Waves: no acute change  Q Waves: none  Low voltage QRS    Clinical Impression: Nonspecific changes and otherwise normal EKG    RADIOLOGY:   Non-plain film images such as CT, Ultrasound and MRI are read by the radiologist.     Interpretation per the Radiologist below, if available at the time of this note:    CT CHEST PULMONARY EMBOLISM W CONTRAST   Final Result   1. Very limited CT PA secondary to poor opacification of the pulmonary   arterial system. No filling defect is present within the main or central   pulmonary arteries. The lobar, segmental and subsegmental branches are   suboptimally evaluated. Repeat imaging is recommended if clinical suspicion   for embolus is high   2. Bibasilar airspace disease, right greater than left. Differential   considerations would include pneumonia or atelectasis   3. Coronary arterial calcifications               ED BEDSIDE ULTRASOUND:   Performed by ED Physician - none    LABS:  Labs Reviewed   CBC WITH AUTO DIFFERENTIAL - Abnormal; Notable for the following components:       Result Value    RDW 16.2 (*)     Eosinophils % 8 (*)     Absolute Eos # 0.60 (*)     All other components within normal limits   COMPREHENSIVE METABOLIC PANEL W/ REFLEX TO MG FOR LOW K - Abnormal; Notable for the following components:    Glucose 126 (*)     Anion Gap 8 (*)     Alkaline Phosphatase 143 (*)     All other components within normal limits   TROPONIN   BRAIN NATRIURETIC PEPTIDE   D-DIMER, QUANTITATIVE   LACTATE, SEPSIS   TROPONIN   URINE RT REFLEX TO CULTURE       All other labs were within normal range or not returned as of this dictation.     EMERGENCY DEPARTMENT COURSE and DIFFERENTIAL DIAGNOSIS/MDM:   Vitals:    Vitals:    08/08/21 2000 08/08/21 2014 08/08/21 2026 08/08/21 2206   BP: (!) 108/95 (!) 108/95  (!) 110/94   Pulse: 80 77  80   Resp: 20 22 17 18   Temp: 98.5 °F (36.9 °C) 98.2 °F (36.8 °C)     TempSrc: Oral Oral     SpO2: 99% 99% 100% 99%   Weight: (!) 158.8 kg (350 lb) (!) 158.8 kg (350 lb 1.5 oz)     Height: 6' 3\" (1.905 m) 6' 3\" (1.905 m)       We did discuss lab work and imaging. He is comfortable with plan of care and verbalizes understanding. I have answered any questions he has at this time. I did go over results with him. I really feel that his legs are chronic venous stasis. Neither 1 is more swollen than the other. And his CT does not show any large PE. He does however have pneumonia which I had discussed with him. He knows to follow-up with family physician. CONSULTS:  None    PROCEDURES:  None    FINAL IMPRESSION      1. COPD exacerbation ([address]    2. Chronic venous stasis dermatitis of both lower extremities    3.  Pneumonia due to infectious organism, unspecified laterality, unspecified part of lung          DISPOSITION/PLAN   DISPOSITION  home      PATIENT REFERRED TO:  DANIEL Duckworth CNP  [address] [address], [address] [facility]  [phone]    Call in 1 day        DISCHARGE MEDICATIONS:  Current Discharge Medication List      START taking these medications    Details   levoFLOXacin (LEVAQUIN) 750 MG tablet Take 1 tablet by mouth daily for 10 days  Qty: 10 tablet, Refills: 0             (Please note that portions of this note were completed with a voice recognition program.  Efforts were made to edit the dictations but occasionally words are mis-transcribed.)    Ashkan Robertson MD  Attending Emergency Physician        Ashkan Robertson MD  08/08/21 8729

## 2021-08-09 NOTE — TELEPHONE ENCOUNTER
Nurse Access attempted to contact pt to notify him of ED f/u appt. Attempt unsuccessful. Voicemail left stating time and date of appt.

## 2021-10-21 ENCOUNTER — APPOINTMENT (OUTPATIENT)
Dept: GENERAL RADIOLOGY | Age: 56
End: 2021-10-21
Payer: MEDICAID

## 2021-10-21 ENCOUNTER — HOSPITAL ENCOUNTER (EMERGENCY)
Age: 56
Discharge: HOME | End: 2021-10-21
Attending: EMERGENCY MEDICINE
Payer: MEDICAID

## 2021-10-21 ENCOUNTER — APPOINTMENT (OUTPATIENT)
Dept: CT IMAGING | Age: 56
End: 2021-10-21
Payer: MEDICAID

## 2021-10-21 VITALS
WEIGHT: 315 LBS | BODY MASS INDEX: 39.17 KG/M2 | HEIGHT: 75 IN | OXYGEN SATURATION: 98 % | SYSTOLIC BLOOD PRESSURE: 118 MMHG | RESPIRATION RATE: 16 BRPM | HEART RATE: 68 BPM | DIASTOLIC BLOOD PRESSURE: 60 MMHG | TEMPERATURE: 98.6 F

## 2021-10-21 DIAGNOSIS — J44.1 COPD EXACERBATION (HCC): Primary | ICD-10-CM

## 2021-10-21 LAB
ABSOLUTE EOS #: 0.3 K/UL (ref 0–0.4)
ABSOLUTE IMMATURE GRANULOCYTE: ABNORMAL K/UL (ref 0–0.3)
ABSOLUTE LYMPH #: 1.2 K/UL (ref 1–4.8)
ABSOLUTE MONO #: 0.6 K/UL (ref 0.1–1.2)
ALBUMIN SERPL-MCNC: 3.8 G/DL (ref 3.5–5.2)
ALBUMIN/GLOBULIN RATIO: 1.3 (ref 1–2.5)
ALLEN TEST: POSITIVE
ALP BLD-CCNC: 150 U/L (ref 40–129)
ALT SERPL-CCNC: <5 U/L (ref 5–41)
ANION GAP SERPL CALCULATED.3IONS-SCNC: 9 MMOL/L (ref 9–17)
AST SERPL-CCNC: 13 U/L
BASOPHILS # BLD: 1 % (ref 0–2)
BASOPHILS ABSOLUTE: 0.1 K/UL (ref 0–0.2)
BILIRUB SERPL-MCNC: 0.86 MG/DL (ref 0.3–1.2)
BILIRUBIN DIRECT: 0.22 MG/DL
BILIRUBIN, INDIRECT: 0.64 MG/DL (ref 0–1)
BNP INTERPRETATION: NORMAL
BUN BLDV-MCNC: 10 MG/DL (ref 6–20)
BUN/CREAT BLD: ABNORMAL (ref 9–20)
CALCIUM SERPL-MCNC: 9.1 MG/DL (ref 8.6–10.4)
CHLORIDE BLD-SCNC: 105 MMOL/L (ref 98–107)
CO2: 25 MMOL/L (ref 20–31)
CREAT SERPL-MCNC: 0.98 MG/DL (ref 0.7–1.2)
D-DIMER QUANTITATIVE: 0.82 MG/L FEU
DIFFERENTIAL TYPE: ABNORMAL
EOSINOPHILS RELATIVE PERCENT: 5 % (ref 1–4)
FIO2: 35
GFR AFRICAN AMERICAN: >60 ML/MIN
GFR NON-AFRICAN AMERICAN: >60 ML/MIN
GFR SERPL CREATININE-BSD FRML MDRD: ABNORMAL ML/MIN/{1.73_M2}
GFR SERPL CREATININE-BSD FRML MDRD: ABNORMAL ML/MIN/{1.73_M2}
GLOBULIN: ABNORMAL G/DL (ref 1.5–3.8)
GLUCOSE BLD-MCNC: 111 MG/DL (ref 70–99)
HCT VFR BLD CALC: 43.9 % (ref 41–53)
HEMOGLOBIN: 14.4 G/DL (ref 13.5–17.5)
IMMATURE GRANULOCYTES: ABNORMAL %
INR BLD: 1
LYMPHOCYTES # BLD: 19 % (ref 24–44)
MCH RBC QN AUTO: 30.3 PG (ref 26–34)
MCHC RBC AUTO-ENTMCNC: 32.8 G/DL (ref 31–37)
MCV RBC AUTO: 92.3 FL (ref 80–100)
MODE: NORMAL
MONOCYTES # BLD: 9 % (ref 2–11)
NEGATIVE BASE EXCESS, ART: NORMAL (ref 0–2)
NRBC AUTOMATED: ABNORMAL PER 100 WBC
O2 DEVICE/FLOW/%: NORMAL
PARTIAL THROMBOPLASTIN TIME: 26.8 SEC (ref 21.3–31.3)
PATIENT TEMP: NORMAL
PDW BLD-RTO: 15.1 % (ref 12.5–15.4)
PLATELET # BLD: 208 K/UL (ref 140–450)
PLATELET ESTIMATE: ABNORMAL
PMV BLD AUTO: 9 FL (ref 6–12)
POC HCO3: 24.5 MMOL/L (ref 21–28)
POC O2 SATURATION: 98 % (ref 94–98)
POC PCO2 TEMP: NORMAL MM HG
POC PCO2: 36.1 MM HG (ref 35–48)
POC PH TEMP: NORMAL
POC PH: 7.44 (ref 7.35–7.45)
POC PO2 TEMP: NORMAL MM HG
POC PO2: 98 MM HG (ref 83–108)
POSITIVE BASE EXCESS, ART: 1 (ref 0–3)
POTASSIUM SERPL-SCNC: 4.5 MMOL/L (ref 3.7–5.3)
PRO-BNP: 174 PG/ML
PROTHROMBIN TIME: 10.3 SEC (ref 9.4–12.6)
RBC # BLD: 4.75 M/UL (ref 4.5–5.9)
RBC # BLD: ABNORMAL 10*6/UL
SAMPLE SITE: NORMAL
SEG NEUTROPHILS: 66 % (ref 36–66)
SEGMENTED NEUTROPHILS ABSOLUTE COUNT: 4.2 K/UL (ref 1.8–7.7)
SODIUM BLD-SCNC: 139 MMOL/L (ref 135–144)
TCO2 (CALC), ART: NORMAL MMOL/L (ref 22–29)
TOTAL PROTEIN: 6.7 G/DL (ref 6.4–8.3)
TROPONIN INTERP: NORMAL
TROPONIN T: NORMAL NG/ML
TROPONIN, HIGH SENSITIVITY: 19 NG/L (ref 0–22)
WBC # BLD: 6.3 K/UL (ref 3.5–11)
WBC # BLD: ABNORMAL 10*3/UL

## 2021-10-21 PROCEDURE — 83880 ASSAY OF NATRIURETIC PEPTIDE: CPT

## 2021-10-21 PROCEDURE — 71260 CT THORAX DX C+: CPT

## 2021-10-21 PROCEDURE — 85610 PROTHROMBIN TIME: CPT

## 2021-10-21 PROCEDURE — 99285 EMERGENCY DEPT VISIT HI MDM: CPT

## 2021-10-21 PROCEDURE — 85379 FIBRIN DEGRADATION QUANT: CPT

## 2021-10-21 PROCEDURE — 82803 BLOOD GASES ANY COMBINATION: CPT

## 2021-10-21 PROCEDURE — 85730 THROMBOPLASTIN TIME PARTIAL: CPT

## 2021-10-21 PROCEDURE — 96361 HYDRATE IV INFUSION ADD-ON: CPT

## 2021-10-21 PROCEDURE — 2580000003 HC RX 258: Performed by: EMERGENCY MEDICINE

## 2021-10-21 PROCEDURE — 80048 BASIC METABOLIC PNL TOTAL CA: CPT

## 2021-10-21 PROCEDURE — 93005 ELECTROCARDIOGRAM TRACING: CPT | Performed by: EMERGENCY MEDICINE

## 2021-10-21 PROCEDURE — 71045 X-RAY EXAM CHEST 1 VIEW: CPT

## 2021-10-21 PROCEDURE — 36415 COLL VENOUS BLD VENIPUNCTURE: CPT

## 2021-10-21 PROCEDURE — 6370000000 HC RX 637 (ALT 250 FOR IP): Performed by: EMERGENCY MEDICINE

## 2021-10-21 PROCEDURE — 96374 THER/PROPH/DIAG INJ IV PUSH: CPT

## 2021-10-21 PROCEDURE — 80076 HEPATIC FUNCTION PANEL: CPT

## 2021-10-21 PROCEDURE — 84484 ASSAY OF TROPONIN QUANT: CPT

## 2021-10-21 PROCEDURE — 85025 COMPLETE CBC W/AUTO DIFF WBC: CPT

## 2021-10-21 PROCEDURE — 6360000004 HC RX CONTRAST MEDICATION: Performed by: EMERGENCY MEDICINE

## 2021-10-21 PROCEDURE — 6360000002 HC RX W HCPCS: Performed by: EMERGENCY MEDICINE

## 2021-10-21 RX ORDER — IPRATROPIUM BROMIDE AND ALBUTEROL SULFATE 2.5; .5 MG/3ML; MG/3ML
1 SOLUTION RESPIRATORY (INHALATION)
Status: DISCONTINUED | OUTPATIENT
Start: 2021-10-21 | End: 2021-10-21 | Stop reason: HOSPADM

## 2021-10-21 RX ORDER — 0.9 % SODIUM CHLORIDE 0.9 %
80 INTRAVENOUS SOLUTION INTRAVENOUS ONCE
Status: COMPLETED | OUTPATIENT
Start: 2021-10-21 | End: 2021-10-21

## 2021-10-21 RX ORDER — SODIUM CHLORIDE 0.9 % (FLUSH) 0.9 %
10 SYRINGE (ML) INJECTION PRN
Status: DISCONTINUED | OUTPATIENT
Start: 2021-10-21 | End: 2021-10-21 | Stop reason: HOSPADM

## 2021-10-21 RX ORDER — PREDNISONE 20 MG/1
TABLET ORAL
Qty: 10 TABLET | Refills: 0 | Status: ON HOLD | OUTPATIENT
Start: 2021-10-21 | End: 2021-11-14 | Stop reason: HOSPADM

## 2021-10-21 RX ORDER — SODIUM CHLORIDE 9 MG/ML
1000 INJECTION, SOLUTION INTRAVENOUS CONTINUOUS
Status: DISCONTINUED | OUTPATIENT
Start: 2021-10-21 | End: 2021-10-21 | Stop reason: HOSPADM

## 2021-10-21 RX ORDER — FUROSEMIDE 10 MG/ML
80 INJECTION INTRAMUSCULAR; INTRAVENOUS ONCE
Status: COMPLETED | OUTPATIENT
Start: 2021-10-21 | End: 2021-10-21

## 2021-10-21 RX ORDER — METHYLPREDNISOLONE SODIUM SUCCINATE 125 MG/2ML
125 INJECTION, POWDER, LYOPHILIZED, FOR SOLUTION INTRAMUSCULAR; INTRAVENOUS ONCE
Status: DISCONTINUED | OUTPATIENT
Start: 2021-10-21 | End: 2021-10-21

## 2021-10-21 RX ORDER — DOXYCYCLINE 100 MG/1
100 TABLET ORAL 2 TIMES DAILY
Qty: 20 TABLET | Refills: 0 | Status: SHIPPED | OUTPATIENT
Start: 2021-10-21 | End: 2021-10-31

## 2021-10-21 RX ADMIN — IOPAMIDOL 75 ML: 755 INJECTION, SOLUTION INTRAVENOUS at 14:49

## 2021-10-21 RX ADMIN — SODIUM CHLORIDE, PRESERVATIVE FREE 10 ML: 5 INJECTION INTRAVENOUS at 14:49

## 2021-10-21 RX ADMIN — SODIUM CHLORIDE 80 ML: 9 INJECTION, SOLUTION INTRAVENOUS at 14:48

## 2021-10-21 RX ADMIN — IPRATROPIUM BROMIDE AND ALBUTEROL SULFATE 1 AMPULE: .5; 3 SOLUTION RESPIRATORY (INHALATION) at 12:10

## 2021-10-21 RX ADMIN — SODIUM CHLORIDE 1000 ML: 9 INJECTION, SOLUTION INTRAVENOUS at 13:26

## 2021-10-21 RX ADMIN — FUROSEMIDE 80 MG: 10 INJECTION INTRAMUSCULAR; INTRAVENOUS at 12:17

## 2021-10-21 ASSESSMENT — PAIN SCALES - GENERAL: PAINLEVEL_OUTOF10: 10

## 2021-10-21 ASSESSMENT — ENCOUNTER SYMPTOMS
ABDOMINAL PAIN: 0
SHORTNESS OF BREATH: 1
COUGH: 1

## 2021-10-21 ASSESSMENT — PAIN DESCRIPTION - ORIENTATION: ORIENTATION: RIGHT;LEFT

## 2021-10-21 ASSESSMENT — PAIN DESCRIPTION - LOCATION: LOCATION: LEG

## 2021-10-21 NOTE — ED PROVIDER NOTES
APIXABAN (ELIQUIS) 5 MG TABS TABLET    Take 1 tablet by mouth twice daily    ATORVASTATIN (LIPITOR) 40 MG TABLET    Take 1 tablet by mouth nightly    BUDESONIDE-FORMOTEROL (SYMBICORT) 160-4.5 MCG/ACT AERO    Inhale 2 puffs into the lungs 2 times daily    FUROSEMIDE (LASIX) 40 MG TABLET    Take 1 tablet by mouth 2 times daily    HYDROXYZINE (VISTARIL) 25 MG CAPSULE    TAKE 1 CAPSULE BY MOUTH EVERY 8 HOURS AS NEEDED FOR ANXIETY    OXYBUTYNIN (DITROPAN-XL) 10 MG EXTENDED RELEASE TABLET    TAKE 1 TABLET BY MOUTH ONCE DAILY    OXYCODONE-ACETAMINOPHEN (PERCOCET) 5-325 MG PER TABLET    Take 1 tablet by mouth every 8 hours as needed. PREGABALIN (LYRICA) 50 MG CAPSULE    Take 50 mg by mouth 3 times daily. TAMSULOSIN (FLOMAX) 0.4 MG CAPSULE    TAKE 1 CAPSULE BY MOUTH ONCE DAILY    TRAMADOL (ULTRAM) 50 MG TABLET    TAKE 1 TABLET BY MOUTH ONCE DAILY AS NEEDED FOR PAIN    ZOLPIDEM (AMBIEN) 10 MG TABLET    Take 10 mg by mouth nightly as needed. ALLERGIES     Patient has no known allergies.     FAMILY HISTORY       Family History   Problem Relation Age of Onset    Lung Cancer Mother           SOCIAL HISTORY       Social History     Socioeconomic History    Marital status: Single     Spouse name: None    Number of children: None    Years of education: None    Highest education level: None   Occupational History    None   Tobacco Use    Smoking status: Former Smoker     Packs/day: 1.00     Years: 25.00     Pack years: 25.00     Types: Cigarettes     Quit date: 2021     Years since quittin.8    Smokeless tobacco: Never Used   Vaping Use    Vaping Use: Never used   Substance and Sexual Activity    Alcohol use: Not Currently    Drug use: Not Currently    Sexual activity: None   Other Topics Concern    None   Social History Narrative    None     Social Determinants of Health     Financial Resource Strain:     Difficulty of Paying Living Expenses:    Food Insecurity:     Worried About Running Out of Food in the Last Year:    [address] in the Last Year:    Transportation Needs:     Lack of Transportation (Medical):  Lack of Transportation (Non-Medical):    Physical Activity:     Days of Exercise per Week:     Minutes of Exercise per Session:    Stress:     Feeling of Stress :    Social Connections:     Frequency of Communication with Friends and Family:     Frequency of Social Gatherings with Friends and Family:     Attends [employer] Services:     Active Member of Clubs or Organizations:     Attends Club or Organization Meetings:     Marital Status:    Intimate Partner Violence:     Fear of Current or Ex-Partner:     Emotionally Abused:     Physically Abused:     Sexually Abused:        SCREENINGS    Teresa Coma Scale  Eye Opening: Spontaneous  Best Verbal Response: Oriented  Best Motor Response: Obeys commands  Teresa Coma Scale Score: 15        PHYSICAL EXAM    (up to 7 for level 4, 8 or more for level 5)     ED Triage Vitals [10/21/21 1129]   BP Temp Temp Source Pulse Resp SpO2 Height Weight   125/60 98.6 °F (37 °C) Axillary 66 18 100 % 6' 3\" (1.905 m) (!) 350 lb (158.8 kg)       Physical Exam  Vitals reviewed. Constitutional:       General: He is in acute distress. Appearance: He is obese. He is not ill-appearing. HENT:      Head: Normocephalic. Eyes:      Extraocular Movements: Extraocular movements intact. Cardiovascular:      Rate and Rhythm: Normal rate and regular rhythm. Pulmonary:      Effort: Respiratory distress present. Comments: Diminished air entry likely due to body habitus with scattered rhonchi. Chest:      Chest wall: No tenderness. Abdominal:      Palpations: Abdomen is soft. Tenderness: There is no abdominal tenderness. Musculoskeletal:         General: Normal range of motion. Cervical back: Neck supple. Skin:     General: Skin is warm and dry. Coloration: Skin is not pale.    Neurological:      General: No focal deficit present. Mental Status: He is alert and oriented to person, place, and time. DIAGNOSTIC RESULTS     EKG: All EKG's are interpreted by the Emergency Department Physician who either signs orCo-signs this chart in the absence of a cardiologist.    Normal sinus rhythm with rate of 66 beats a minute. Low voltages. No acute ischemic changes. RADIOLOGY:     Interpretation per the Radiologist below, ifavailable at the time of this note:    CT CHEST PULMONARY EMBOLISM W CONTRAST   Final Result   No large or central pulmonary embolism; assessment for small peripheral   emboli, especially to the lower lobes, limited on this study, as above. Persistent bibasilar predominant opacities, atelectasis and/or   bronchopneumonia, as previously noted. Prior granulomatous disease. Unchanged small left lower lobe nodule, probably related but see   recommendations below. RECOMMENDATIONS:   Fleischner Society guidelines for follow-up and management of incidentally   detected pulmonary nodules:      Single Solid Nodule:      Nodule size less than 6 mm   In a low-risk patient, no routine follow-up. In a high-risk patient, optional CT at 12 months. Radiology 2017 http://pubs. rsna.org/doi/full/10.1148/radiol. 0687763062         XR CHEST PORTABLE   Final Result   No acute cardiopulmonary findings               ED BEDSIDE ULTRASOUND:   Performed by ED Physician - none    LABS:  Labs Reviewed   CBC WITH AUTO DIFFERENTIAL - Abnormal; Notable for the following components:       Result Value    Lymphocytes 19 (*)     Eosinophils % 5 (*)     All other components within normal limits   BASIC METABOLIC PANEL W/ REFLEX TO MG FOR LOW K - Abnormal; Notable for the following components:    Glucose 111 (*)     All other components within normal limits   HEPATIC FUNCTION PANEL - Abnormal; Notable for the following components:    Alkaline Phosphatase 150 (*)     ALT <5 (*)     All other components within normal limits TROPONIN   BRAIN NATRIURETIC PEPTIDE   PROTIME-INR   APTT   D-DIMER, QUANTITATIVE   ARTERIAL BLOOD GAS, POC       All other labs were within normal range ornot returned as of this dictation. EMERGENCY DEPARTMENT COURSE and DIFFERENTIAL DIAGNOSIS/MDM:   Vitals:    Vitals:    10/21/21 1155 10/21/21 1210 10/21/21 1321 10/21/21 1539   BP:   119/66 117/66   Pulse:   67 65   Resp: 22 20 17 17   Temp:       TempSrc:       SpO2: 100% 100% 97% 97%   Weight:       Height:                Patient presented to the ED with CPAP in place. He was switched to BiPAP and given a DuoNeb aerosol treatment. Arterial blood gases showed no evidence of acute respiratory failure. Cardiac work-up is negative for acute ischemia or injury. Chest x-ray was nondiagnostic. D-dimer was elevated and CT angiogram of the chest did not show pulmonary embolism. Atelectatic changes are reported in the bases and could represent lingering bronchopneumonia. Patient is stable for discharge at this time and is placed on prednisone 40 mg daily for 5 days and doxycycline 100 mg twice a day for 10 days. He received Solu-Medrol 125 mg IV by EMS prior to arrival.  Patient appeared puffy and was given Lasix 80 mg IV in the ED.     MDM     CONSULTS:  None    PROCEDURES:  Unlessotherwise noted below, none     Procedures    FINAL IMPRESSION      1. COPD exacerbation ([facility] [address]          DISPOSITION/PLAN   DISPOSITION Decision To Discharge 10/21/2021 04:19:48 PM      PATIENT REFERRED TO:  DANIEL Norwood - CNP  [facility], [id number]  [facility] [place] [address]  [phone]            DISCHARGE MEDICATIONS:         Problem List:  Patient Active Problem List   Diagnosis Code    Clotting disorder ([facility] [address] D68.9    Diabetes mellitus type 2, controlled ([address] E11.9    GERD (gastroesophageal reflux disease) K21.9    Sleep apnea G47.30    COPD exacerbation ([address] J44.1    History of venous embolism and thrombosis of deep vessels of proximal lower extremity ([address] I82.4Y9    COPD (chronic obstructive pulmonary disease) ([facility]) J44.9    Anxiety F41.9    Cellulitis L03.90    Long term (current) use of anticoagulants Z79.01    Visual impairment H54.7    Class 3 severe obesity with serious comorbidity in adult [facility]) E66.01           Summation      Patient Course: Discharged. ED Medicationsadministered this visit:    Medications   ipratropium-albuterol (DUONEB) nebulizer solution 1 ampule (0 ampules Inhalation Held 10/21/21 1622)   0.9 % sodium chloride infusion (1,000 mLs IntraVENous New Bag 10/21/21 1326)   sodium chloride flush 0.9 % injection 10 mL (10 mLs IntraVENous Given 10/21/21 1449)   furosemide (LASIX) injection 80 mg (80 mg IntraVENous Given 10/21/21 1217)   0.9 % sodium chloride bolus (0 mLs IntraVENous Stopped 10/21/21 1538)   iopamidol (ISOVUE-370) 76 % injection 75 mL (75 mLs IntraVENous Given 10/21/21 1449)       New Prescriptions from this visit:    New Prescriptions    DOXYCYCLINE MONOHYDRATE (ADOXA) 100 MG TABLET    Take 1 tablet by mouth 2 times daily for 10 days    PREDNISONE (DELTASONE) 20 MG TABLET    Take 2 tablets by mouth every morning till gone. Follow-up:  Chasidy Zavala, APRN - [address], [address] [address]  [phone]              Final Impression:   1.  COPD exacerbation ([facility] [address]               (Please note that portions of this note were completed with a voice recognitionprogram.  Efforts were made to edit the dictations but occasionally words are mis-transcribed.)    Lara Barba MD (electronically signed)  Attending Emergency Physician            Lara Barba MD  10/21/21 9064

## 2021-10-21 NOTE — ED NOTES
AVS reviewed with the patient. Discussed follow-up care, taking medications as prescribed, and when to call 911. VSS. All questions answered. IV Removed per order. Prescriptions sent to patient's pharmacy. Wheeled out of the department by ER staff. Pt's sister present to drive patient home.        Sherrill Randall RN  10/21/21 7482

## 2021-10-22 LAB
EKG ATRIAL RATE: 66 BPM
EKG P AXIS: 52 DEGREES
EKG P-R INTERVAL: 178 MS
EKG Q-T INTERVAL: 404 MS
EKG QRS DURATION: 100 MS
EKG QTC CALCULATION (BAZETT): 423 MS
EKG R AXIS: 0 DEGREES
EKG T AXIS: 30 DEGREES
EKG VENTRICULAR RATE: 66 BPM

## 2021-11-12 ENCOUNTER — HOSPITAL ENCOUNTER (INPATIENT)
Age: 56
LOS: 2 days | Discharge: HOME | DRG: 137 | End: 2021-11-14
Attending: EMERGENCY MEDICINE | Admitting: INTERNAL MEDICINE
Payer: MEDICAID

## 2021-11-12 ENCOUNTER — APPOINTMENT (OUTPATIENT)
Dept: GENERAL RADIOLOGY | Age: 56
DRG: 137 | End: 2021-11-12
Payer: MEDICAID

## 2021-11-12 ENCOUNTER — APPOINTMENT (OUTPATIENT)
Dept: CT IMAGING | Age: 56
DRG: 137 | End: 2021-11-12
Payer: MEDICAID

## 2021-11-12 DIAGNOSIS — J44.1 COPD EXACERBATION (HCC): Primary | ICD-10-CM

## 2021-11-12 DIAGNOSIS — U07.1 COVID-19: ICD-10-CM

## 2021-11-12 PROBLEM — J12.82 PNEUMONIA DUE TO COVID-19 VIRUS: Status: ACTIVE | Noted: 2021-11-12

## 2021-11-12 LAB
ABSOLUTE EOS #: 0.12 K/UL (ref 0–0.44)
ABSOLUTE IMMATURE GRANULOCYTE: <0.03 K/UL (ref 0–0.3)
ABSOLUTE LYMPH #: 1.33 K/UL (ref 1.1–3.7)
ABSOLUTE MONO #: 0.68 K/UL (ref 0.1–1.2)
ANION GAP SERPL CALCULATED.3IONS-SCNC: 16 MMOL/L (ref 9–17)
BASOPHILS # BLD: 0 % (ref 0–2)
BASOPHILS ABSOLUTE: <0.03 K/UL (ref 0–0.2)
BNP INTERPRETATION: NORMAL
BUN BLDV-MCNC: 13 MG/DL (ref 6–20)
BUN/CREAT BLD: ABNORMAL (ref 9–20)
C-REACTIVE PROTEIN: 90.2 MG/L (ref 0–5)
CALCIUM SERPL-MCNC: 8.7 MG/DL (ref 8.6–10.4)
CHLORIDE BLD-SCNC: 104 MMOL/L (ref 98–107)
CO2: 20 MMOL/L (ref 20–31)
CREAT SERPL-MCNC: 0.95 MG/DL (ref 0.7–1.2)
D-DIMER QUANTITATIVE: 0.48 MG/L FEU
DIFFERENTIAL TYPE: ABNORMAL
EOSINOPHILS RELATIVE PERCENT: 2 % (ref 1–4)
FERRITIN: 267 UG/L (ref 30–400)
FIBRINOGEN: 578 MG/DL (ref 140–420)
GFR AFRICAN AMERICAN: >60 ML/MIN
GFR NON-AFRICAN AMERICAN: >60 ML/MIN
GFR SERPL CREATININE-BSD FRML MDRD: ABNORMAL ML/MIN/{1.73_M2}
GFR SERPL CREATININE-BSD FRML MDRD: ABNORMAL ML/MIN/{1.73_M2}
GLUCOSE BLD-MCNC: 116 MG/DL (ref 70–99)
HCT VFR BLD CALC: 46.1 % (ref 40.7–50.3)
HEMOGLOBIN: 14.6 G/DL (ref 13–17)
IMMATURE GRANULOCYTES: 0 %
LACTATE DEHYDROGENASE: 218 U/L (ref 135–225)
LYMPHOCYTES # BLD: 26 % (ref 24–43)
MCH RBC QN AUTO: 29.7 PG (ref 25.2–33.5)
MCHC RBC AUTO-ENTMCNC: 31.7 G/DL (ref 28.4–34.8)
MCV RBC AUTO: 93.9 FL (ref 82.6–102.9)
MONOCYTES # BLD: 13 % (ref 3–12)
NRBC AUTOMATED: 0 PER 100 WBC
PDW BLD-RTO: 14.1 % (ref 11.8–14.4)
PLATELET # BLD: 163 K/UL (ref 138–453)
PLATELET ESTIMATE: ABNORMAL
PMV BLD AUTO: 10.4 FL (ref 8.1–13.5)
POTASSIUM SERPL-SCNC: 3.9 MMOL/L (ref 3.7–5.3)
PRO-BNP: 45 PG/ML
PROCALCITONIN: 0.06 NG/ML
RBC # BLD: 4.91 M/UL (ref 4.21–5.77)
RBC # BLD: ABNORMAL 10*6/UL
SARS-COV-2, RAPID: DETECTED
SEG NEUTROPHILS: 59 % (ref 36–65)
SEGMENTED NEUTROPHILS ABSOLUTE COUNT: 2.91 K/UL (ref 1.5–8.1)
SODIUM BLD-SCNC: 140 MMOL/L (ref 135–144)
SPECIMEN DESCRIPTION: ABNORMAL
TROPONIN INTERP: NORMAL
TROPONIN INTERP: NORMAL
TROPONIN T: NORMAL NG/ML
TROPONIN T: NORMAL NG/ML
TROPONIN, HIGH SENSITIVITY: 17 NG/L (ref 0–22)
TROPONIN, HIGH SENSITIVITY: 18 NG/L (ref 0–22)
WBC # BLD: 5.1 K/UL (ref 3.5–11.3)
WBC # BLD: ABNORMAL 10*3/UL

## 2021-11-12 PROCEDURE — 2700000000 HC OXYGEN THERAPY PER DAY

## 2021-11-12 PROCEDURE — 94640 AIRWAY INHALATION TREATMENT: CPT

## 2021-11-12 PROCEDURE — 2580000003 HC RX 258: Performed by: STUDENT IN AN ORGANIZED HEALTH CARE EDUCATION/TRAINING PROGRAM

## 2021-11-12 PROCEDURE — 76937 US GUIDE VASCULAR ACCESS: CPT

## 2021-11-12 PROCEDURE — 2580000003 HC RX 258: Performed by: INTERNAL MEDICINE

## 2021-11-12 PROCEDURE — 6370000000 HC RX 637 (ALT 250 FOR IP): Performed by: STUDENT IN AN ORGANIZED HEALTH CARE EDUCATION/TRAINING PROGRAM

## 2021-11-12 PROCEDURE — 6370000000 HC RX 637 (ALT 250 FOR IP): Performed by: EMERGENCY MEDICINE

## 2021-11-12 PROCEDURE — 99285 EMERGENCY DEPT VISIT HI MDM: CPT

## 2021-11-12 PROCEDURE — 82728 ASSAY OF FERRITIN: CPT

## 2021-11-12 PROCEDURE — 6360000002 HC RX W HCPCS: Performed by: INTERNAL MEDICINE

## 2021-11-12 PROCEDURE — 84145 PROCALCITONIN (PCT): CPT

## 2021-11-12 PROCEDURE — 6360000002 HC RX W HCPCS

## 2021-11-12 PROCEDURE — 99254 IP/OBS CNSLTJ NEW/EST MOD 60: CPT | Performed by: INTERNAL MEDICINE

## 2021-11-12 PROCEDURE — 85025 COMPLETE CBC W/AUTO DIFF WBC: CPT

## 2021-11-12 PROCEDURE — 96374 THER/PROPH/DIAG INJ IV PUSH: CPT

## 2021-11-12 PROCEDURE — 87635 SARS-COV-2 COVID-19 AMP PRB: CPT

## 2021-11-12 PROCEDURE — 84484 ASSAY OF TROPONIN QUANT: CPT

## 2021-11-12 PROCEDURE — 83880 ASSAY OF NATRIURETIC PEPTIDE: CPT

## 2021-11-12 PROCEDURE — 96375 TX/PRO/DX INJ NEW DRUG ADDON: CPT

## 2021-11-12 PROCEDURE — 85384 FIBRINOGEN ACTIVITY: CPT

## 2021-11-12 PROCEDURE — 93005 ELECTROCARDIOGRAM TRACING: CPT | Performed by: EMERGENCY MEDICINE

## 2021-11-12 PROCEDURE — 80048 BASIC METABOLIC PNL TOTAL CA: CPT

## 2021-11-12 PROCEDURE — XW033H5 INTRODUCTION OF TOCILIZUMAB INTO PERIPHERAL VEIN, PERCUTANEOUS APPROACH, NEW TECHNOLOGY GROUP 5: ICD-10-PCS | Performed by: INTERNAL MEDICINE

## 2021-11-12 PROCEDURE — 86140 C-REACTIVE PROTEIN: CPT

## 2021-11-12 PROCEDURE — 6360000002 HC RX W HCPCS: Performed by: EMERGENCY MEDICINE

## 2021-11-12 PROCEDURE — 83615 LACTATE (LD) (LDH) ENZYME: CPT

## 2021-11-12 PROCEDURE — 93970 EXTREMITY STUDY: CPT

## 2021-11-12 PROCEDURE — 99291 CRITICAL CARE FIRST HOUR: CPT | Performed by: INTERNAL MEDICINE

## 2021-11-12 PROCEDURE — 1200000000 HC SEMI PRIVATE

## 2021-11-12 PROCEDURE — 71045 X-RAY EXAM CHEST 1 VIEW: CPT

## 2021-11-12 PROCEDURE — 85379 FIBRIN DEGRADATION QUANT: CPT

## 2021-11-12 RX ORDER — MORPHINE SULFATE 4 MG/ML
4 INJECTION, SOLUTION INTRAMUSCULAR; INTRAVENOUS ONCE
Status: COMPLETED | OUTPATIENT
Start: 2021-11-12 | End: 2021-11-12

## 2021-11-12 RX ORDER — ACETAMINOPHEN 325 MG/1
650 TABLET ORAL EVERY 6 HOURS PRN
Status: DISCONTINUED | OUTPATIENT
Start: 2021-11-12 | End: 2021-11-15 | Stop reason: HOSPADM

## 2021-11-12 RX ORDER — BUDESONIDE AND FORMOTEROL FUMARATE DIHYDRATE 160; 4.5 UG/1; UG/1
2 AEROSOL RESPIRATORY (INHALATION) 2 TIMES DAILY
Status: DISCONTINUED | OUTPATIENT
Start: 2021-11-12 | End: 2021-11-15 | Stop reason: HOSPADM

## 2021-11-12 RX ORDER — ONDANSETRON 4 MG/1
4 TABLET, ORALLY DISINTEGRATING ORAL EVERY 8 HOURS PRN
Status: DISCONTINUED | OUTPATIENT
Start: 2021-11-12 | End: 2021-11-15 | Stop reason: HOSPADM

## 2021-11-12 RX ORDER — IPRATROPIUM BROMIDE AND ALBUTEROL SULFATE 2.5; .5 MG/3ML; MG/3ML
1 SOLUTION RESPIRATORY (INHALATION)
Status: DISCONTINUED | OUTPATIENT
Start: 2021-11-13 | End: 2021-11-12

## 2021-11-12 RX ORDER — SODIUM CHLORIDE 0.9 % (FLUSH) 0.9 %
10 SYRINGE (ML) INJECTION EVERY 12 HOURS SCHEDULED
Status: DISCONTINUED | OUTPATIENT
Start: 2021-11-12 | End: 2021-11-15 | Stop reason: HOSPADM

## 2021-11-12 RX ORDER — SODIUM CHLORIDE 9 MG/ML
INJECTION, SOLUTION INTRAVENOUS CONTINUOUS
Status: DISCONTINUED | OUTPATIENT
Start: 2021-11-12 | End: 2021-11-13

## 2021-11-12 RX ORDER — ATORVASTATIN CALCIUM 40 MG/1
40 TABLET, FILM COATED ORAL NIGHTLY
Status: DISCONTINUED | OUTPATIENT
Start: 2021-11-12 | End: 2021-11-15 | Stop reason: HOSPADM

## 2021-11-12 RX ORDER — KETOROLAC TROMETHAMINE 30 MG/ML
30 INJECTION, SOLUTION INTRAMUSCULAR; INTRAVENOUS ONCE
Status: COMPLETED | OUTPATIENT
Start: 2021-11-12 | End: 2021-11-12

## 2021-11-12 RX ORDER — MAGNESIUM SULFATE 1 G/100ML
1000 INJECTION INTRAVENOUS PRN
Status: DISCONTINUED | OUTPATIENT
Start: 2021-11-12 | End: 2021-11-15 | Stop reason: HOSPADM

## 2021-11-12 RX ORDER — FENTANYL CITRATE 50 UG/ML
50 INJECTION, SOLUTION INTRAMUSCULAR; INTRAVENOUS ONCE
Status: COMPLETED | OUTPATIENT
Start: 2021-11-12 | End: 2021-11-12

## 2021-11-12 RX ORDER — DEXAMETHASONE SODIUM PHOSPHATE 10 MG/ML
6 INJECTION INTRAMUSCULAR; INTRAVENOUS ONCE
Status: COMPLETED | OUTPATIENT
Start: 2021-11-12 | End: 2021-11-12

## 2021-11-12 RX ORDER — ACETAMINOPHEN 650 MG/1
650 SUPPOSITORY RECTAL EVERY 6 HOURS PRN
Status: DISCONTINUED | OUTPATIENT
Start: 2021-11-12 | End: 2021-11-15 | Stop reason: HOSPADM

## 2021-11-12 RX ORDER — SODIUM CHLORIDE 0.9 % (FLUSH) 0.9 %
10 SYRINGE (ML) INJECTION PRN
Status: DISCONTINUED | OUTPATIENT
Start: 2021-11-12 | End: 2021-11-15 | Stop reason: HOSPADM

## 2021-11-12 RX ORDER — SODIUM CHLORIDE 9 MG/ML
25 INJECTION, SOLUTION INTRAVENOUS PRN
Status: DISCONTINUED | OUTPATIENT
Start: 2021-11-12 | End: 2021-11-15 | Stop reason: HOSPADM

## 2021-11-12 RX ORDER — ALBUTEROL SULFATE 90 UG/1
2 AEROSOL, METERED RESPIRATORY (INHALATION) EVERY 4 HOURS PRN
Status: DISCONTINUED | OUTPATIENT
Start: 2021-11-12 | End: 2021-11-13

## 2021-11-12 RX ORDER — ZOLPIDEM TARTRATE 5 MG/1
10 TABLET ORAL NIGHTLY PRN
Status: DISCONTINUED | OUTPATIENT
Start: 2021-11-12 | End: 2021-11-15 | Stop reason: HOSPADM

## 2021-11-12 RX ORDER — SENNOSIDES 8.8 MG/5ML
5 LIQUID ORAL 2 TIMES DAILY PRN
Status: DISCONTINUED | OUTPATIENT
Start: 2021-11-12 | End: 2021-11-15 | Stop reason: HOSPADM

## 2021-11-12 RX ORDER — ALBUTEROL SULFATE 2.5 MG/3ML
2.5 SOLUTION RESPIRATORY (INHALATION) 4 TIMES DAILY
Status: DISCONTINUED | OUTPATIENT
Start: 2021-11-12 | End: 2021-11-13

## 2021-11-12 RX ORDER — DEXAMETHASONE SODIUM PHOSPHATE 10 MG/ML
6 INJECTION INTRAMUSCULAR; INTRAVENOUS EVERY 24 HOURS
Status: DISCONTINUED | OUTPATIENT
Start: 2021-11-12 | End: 2021-11-15 | Stop reason: HOSPADM

## 2021-11-12 RX ORDER — ONDANSETRON 2 MG/ML
4 INJECTION INTRAMUSCULAR; INTRAVENOUS EVERY 6 HOURS PRN
Status: DISCONTINUED | OUTPATIENT
Start: 2021-11-12 | End: 2021-11-15 | Stop reason: HOSPADM

## 2021-11-12 RX ADMIN — SODIUM CHLORIDE, PRESERVATIVE FREE 10 ML: 5 INJECTION INTRAVENOUS at 22:00

## 2021-11-12 RX ADMIN — KETOROLAC TROMETHAMINE 30 MG: 30 INJECTION, SOLUTION INTRAMUSCULAR; INTRAVENOUS at 14:20

## 2021-11-12 RX ADMIN — MORPHINE SULFATE 4 MG: 4 INJECTION INTRAVENOUS at 12:46

## 2021-11-12 RX ADMIN — ZOLPIDEM TARTRATE 10 MG: 5 TABLET ORAL at 21:40

## 2021-11-12 RX ADMIN — ENOXAPARIN SODIUM 40 MG: 40 INJECTION SUBCUTANEOUS at 21:33

## 2021-11-12 RX ADMIN — ACETAMINOPHEN 650 MG: 325 TABLET ORAL at 21:39

## 2021-11-12 RX ADMIN — TOCILIZUMAB 810 MG: 180 INJECTION, SOLUTION SUBCUTANEOUS at 17:59

## 2021-11-12 RX ADMIN — ALBUTEROL SULFATE 2 PUFF: 90 AEROSOL, METERED RESPIRATORY (INHALATION) at 21:36

## 2021-11-12 RX ADMIN — FENTANYL CITRATE 50 MCG: 50 INJECTION, SOLUTION INTRAMUSCULAR; INTRAVENOUS at 10:25

## 2021-11-12 RX ADMIN — SODIUM CHLORIDE: 9 INJECTION, SOLUTION INTRAVENOUS at 18:20

## 2021-11-12 RX ADMIN — DEXAMETHASONE SODIUM PHOSPHATE 6 MG: 10 INJECTION INTRAMUSCULAR; INTRAVENOUS at 11:48

## 2021-11-12 RX ADMIN — BUDESONIDE AND FORMOTEROL FUMARATE DIHYDRATE 2 PUFF: 160; 4.5 AEROSOL RESPIRATORY (INHALATION) at 21:36

## 2021-11-12 RX ADMIN — ALBUTEROL SULFATE 2 PUFF: 90 AEROSOL, METERED RESPIRATORY (INHALATION) at 07:40

## 2021-11-12 RX ADMIN — DESMOPRESSIN ACETATE 40 MG: 0.2 TABLET ORAL at 21:33

## 2021-11-12 RX ADMIN — DEXAMETHASONE SODIUM PHOSPHATE 6 MG: 10 INJECTION INTRAMUSCULAR; INTRAVENOUS at 17:59

## 2021-11-12 ASSESSMENT — ENCOUNTER SYMPTOMS
CHEST TIGHTNESS: 0
APNEA: 0
CHEST TIGHTNESS: 1
RHINORRHEA: 0
EYE ITCHING: 0
SINUS PAIN: 0
COUGH: 1
VOMITING: 0
WHEEZING: 1
NAUSEA: 0
SINUS PRESSURE: 0
EYE PAIN: 0
EYE DISCHARGE: 0
CONSTIPATION: 0
CHOKING: 0
COLOR CHANGE: 1
ABDOMINAL PAIN: 0
ABDOMINAL DISTENTION: 0
BACK PAIN: 0
DIARRHEA: 0
SHORTNESS OF BREATH: 1
COLOR CHANGE: 0

## 2021-11-12 ASSESSMENT — PAIN SCALES - GENERAL
PAINLEVEL_OUTOF10: 10
PAINLEVEL_OUTOF10: 10
PAINLEVEL_OUTOF10: 5
PAINLEVEL_OUTOF10: 10
PAINLEVEL_OUTOF10: 10
PAINLEVEL_OUTOF10: 0
PAINLEVEL_OUTOF10: 5

## 2021-11-12 ASSESSMENT — PAIN DESCRIPTION - ORIENTATION
ORIENTATION: RIGHT;LEFT
ORIENTATION: RIGHT;LEFT

## 2021-11-12 ASSESSMENT — PAIN DESCRIPTION - PAIN TYPE
TYPE: ACUTE PAIN
TYPE: CHRONIC PAIN

## 2021-11-12 ASSESSMENT — PAIN DESCRIPTION - LOCATION
LOCATION: LEG
LOCATION: LEG

## 2021-11-12 NOTE — ED NOTES
Patient had bowel movement in his pants. Patient cleaned up, bedding changed, and brief placed. Patient using urinal to urinate at this time. Patient denies any other needs at this time.  Awaiting more results     Jodi Lockett RN  11/12/21 5455

## 2021-11-12 NOTE — PLAN OF CARE
Problem: Airway Clearance - Ineffective  Goal: Achieve or maintain patent airway  Outcome: Ongoing     Problem: Gas Exchange - Impaired  Goal: Absence of hypoxia  Outcome: Ongoing  Goal: Promote optimal lung function  Outcome: Ongoing     Problem: Breathing Pattern - Ineffective  Goal: Ability to achieve and maintain a regular respiratory rate  Outcome: Ongoing     Problem:  Body Temperature -  Risk of, Imbalanced  Goal: Ability to maintain a body temperature within defined limits  Outcome: Ongoing  Goal: Will regain or maintain usual level of consciousness  Outcome: Ongoing  Goal: Complications related to the disease process, condition or treatment will be avoided or minimized  Outcome: Ongoing     Problem: Isolation Precautions - Risk of Spread of Infection  Goal: Prevent transmission of infection  Outcome: Ongoing     Problem: Nutrition Deficits  Goal: Optimize nutritional status  Outcome: Ongoing     Problem: Risk for Fluid Volume Deficit  Goal: Maintain normal heart rhythm  Outcome: Ongoing  Goal: Maintain absence of muscle cramping  Outcome: Ongoing  Goal: Maintain normal serum potassium, sodium, calcium, phosphorus, and pH  Outcome: Ongoing     Problem: Loneliness or Risk for Loneliness  Goal: Demonstrate positive use of time alone when socialization is not possible  Outcome: Ongoing     Problem: Fatigue  Goal: Verbalize increase energy and improved vitality  Outcome: Ongoing     Problem: Patient Education: Go to Patient Education Activity  Goal: Patient/Family Education  Outcome: Ongoing     Problem: Skin Integrity:  Goal: Will show no infection signs and symptoms  Description: Will show no infection signs and symptoms  Outcome: Ongoing  Goal: Absence of new skin breakdown  Description: Absence of new skin breakdown  Outcome: Ongoing     Problem: Falls - Risk of:  Goal: Will remain free from falls  Description: Will remain free from falls  Outcome: Ongoing  Goal: Absence of physical injury  Description: Absence of physical injury  Outcome: Ongoing     Problem: Confusion - Acute:  Goal: Absence of continued neurological deterioration signs and symptoms  Description: Absence of continued neurological deterioration signs and symptoms  Outcome: Ongoing  Goal: Mental status will be restored to baseline  Description: Mental status will be restored to baseline  Outcome: Ongoing     Problem: Discharge Planning:  Goal: Ability to perform activities of daily living will improve  Description: Ability to perform activities of daily living will improve  Outcome: Ongoing  Goal: Participates in care planning  Description: Participates in care planning  Outcome: Ongoing     Problem: Injury - Risk of, Physical Injury:  Goal: Will remain free from falls  Description: Will remain free from falls  Outcome: Ongoing  Goal: Absence of physical injury  Description: Absence of physical injury  Outcome: Ongoing

## 2021-11-12 NOTE — ED PROVIDER NOTES
[facility]     Emergency Department     Faculty Attestation    I performed a history and physical examination of the patient and discussed management with the resident. I reviewed the resident´s note and agree with the documented findings and plan of care. Any areas of disagreement are noted on the chart. I was personally present for the key portions of any procedures. I have documented in the chart those procedures where I was not present during the key portions. I have reviewed the emergency nurses triage note. I agree with the chief complaint, past medical history, past surgical history, allergies, medications, social and family history as documented unless otherwise noted below. For Physician Assistant/ Nurse Practitioner cases/documentation I have personally evaluated this patient and have completed at least one if not all key elements of the E/M (history, physical exam, and MDM). Additional findings are as noted. History of COPD, not vaccinated for Covid, denies chest pain, only complaint is shortness of breath, patient states his legs are always painful. On exam he has some chronic skin changes in lower extremities with trace pretibial pitting, decreased breath sounds at the bases, heart regular rate and rhythm.        EKG Interpretation    Interpreted by emergency department physician    Rhythm: normal sinus   Rate: normal/74  Axis: normal -22  Ectopy: none  Conduction: normal  ST Segments: no acute change  T Waves: no acute change  Q Waves: none    Clinical Impression: Normal EKG    Carlos Manuel Strong, AMANDA Strong MD  11/12/21 3569

## 2021-11-12 NOTE — ED NOTES
Per resident, CT notified him that the patient did not tolerate the CT, he was having too much BLE pain.      Jose Garza RN  11/12/21 7333

## 2021-11-12 NOTE — ED NOTES
The following labs labeled with pt sticker and tubed to lab:     [] Blue     [] Lavender   [] on ice  [] Green/yellow  [] Green/black [] on ice  [] Yellow  [] Red  [] Pink      [x] COVID-19 swab    [x] Rapid  [] PCR  [] Peds Viral Panel     [] Urine Sample  [] Pelvic Cultures  [] Blood Cultures     Citlalli Templeton, [address] [place] [address]  11/12/21 7212

## 2021-11-12 NOTE — ED NOTES
The following labs labeled with pt sticker and tubed to lab:     [] Blue     [] Lavender   [] on ice  [x] Green/yellow  [] Green/black [] on ice  [] Yellow  [] Red  [] Pink      [] COVID-19 swab    [] Rapid  [] PCR  [] Peds Viral Panel     [] Urine Sample  [] Pelvic Cultures  [] Blood Cultures     [facility], [address] [place] [address]  11/12/21 4229

## 2021-11-12 NOTE — ED NOTES
Bed: 30  Expected date:   Expected time:   Means of arrival:   Comments:  ayala Sheldon RN  11/12/21 7915

## 2021-11-12 NOTE — ED NOTES
Patient brought into ED via EMS from home for evaluation of shortness of breath. Patient reports generalized body aches and fatigue for the past three days and shortness of breath worsening over the past three hours PTA. Patient has two people in the house that he lives that have been diagnosed with [address]. Patient reports some cough and BLE pain that is acute on chronic. Patient denies fever, chills, chest pain, numbness, tingling.      Jennifer Espino RN  11/12/21 1006

## 2021-11-12 NOTE — ED PROVIDER NOTES
[facility] ED  Emergency Department Encounter  EmergencyMedicine Resident     Pt Emilie Carson  MRN: [id number]  [place] 1965  Date of evaluation: 11/12/21  PCP:  Jayesh Lr [address] [place] [address] El[place]       Chief Complaint   Patient presents with    Concern For COVID-19    Shortness of Breath       HISTORY OF PRESENT ILLNESS  (Location/Symptom, Timing/Onset, Context/Setting, Quality, Duration, Modifying Factors, Severity.)      Mason Delcid is a 49 y.o. male who presents with shortness of breath and lower extremity myalgias that started this morning when he woke up. Patient states that he lives with 2 people who were recently tested positive for Covid. Patient states he has a history of COPD, does not take any medications does not wear oxygen at home. Patient has poor medical compliance, states that his bilateral lower extremities have been swelling for multiple months with  unknown cause and has not been seen by primary care doctor for to evaluate. Patient states that he has had associated cough with shortness of breath, states has been difficult to deep breathe, has associated lightheadedness and chills. Patient denies any chest pain, abdominal pain, change in urination or bowel habits. PAST MEDICAL / SURGICAL / SOCIAL / FAMILY HISTORY      has a past medical history of COPD (chronic obstructive pulmonary disease) ([facility] [address] Diabetes mellitus ([facility] [address] and ROSEY on CPAP. No additional pertinent     has no past surgical history on file.   No additional pertinent    Social History     Socioeconomic History    Marital status: Single     Spouse name: Not on file    Number of children: Not on file    Years of education: Not on file    Highest education level: Not on file   Occupational History    Not on file   Tobacco Use    Smoking status: Former Smoker     Packs/day: 1.00     Years: 25.00     Pack years: 25.00     Types: Cigarettes     Quit date: 01/2021     Years since quittin.8    Smokeless tobacco: Never Used   Vaping Use    Vaping Use: Never used   Substance and Sexual Activity    Alcohol use: Not Currently    Drug use: Not Currently    Sexual activity: Not on file   Other Topics Concern    Not on file   Social History Narrative    Not on file     Social Determinants of Health     Financial Resource Strain:     Difficulty of Paying Living Expenses: Not on file   Food Insecurity:     Worried About Running Out of Food in the Last Year: Not on file    Bony of Food in the Last Year: Not on file   Transportation Needs:     Lack of Transportation (Medical): Not on file    Lack of Transportation (Non-Medical): Not on file   Physical Activity:     Days of Exercise per Week: Not on file    Minutes of Exercise per Session: Not on file   Stress:     Feeling of Stress : Not on file   Social Connections:     Frequency of Communication with Friends and Family: Not on file    Frequency of Social Gatherings with Friends and Family: Not on file    Attends Religion Services: Not on file    Active Member of [address] [place] [address] or Organizations: Not on file    Attends Club or Organization Meetings: Not on file    Marital Status: Not on file   Intimate Partner Violence:     Fear of Current or Ex-Partner: Not on file    Emotionally Abused: Not on file    Physically Abused: Not on file    Sexually Abused: Not on file   Housing Stability:     Unable to Pay for Housing in the Last Year: Not on file    Number of [place] in the Last Year: Not on file    Unstable Housing in the Last Year: Not on file       Family History   Problem Relation Age of Onset    Lung Cancer Mother        Allergies:  Patient has no known allergies. Home Medications:  Prior to Admission medications    Medication Sig Start Date End Date Taking? Authorizing Provider   predniSONE (DELTASONE) 20 MG tablet Take 2 tablets by mouth every morning till gone.  10/21/21   Priscila Higgins MD   oxybutynin (DITROPAN-XL) 10 MG extended release tablet TAKE 1 TABLET BY MOUTH ONCE DAILY 7/19/21   Historical Provider, MD   tamsulosin (FLOMAX) 0.4 MG capsule TAKE 1 CAPSULE BY MOUTH ONCE DAILY 7/19/21   Historical Provider, MD   oxyCODONE-acetaminophen (PERCOCET) 5-325 MG per tablet Take 1 tablet by mouth every 8 hours as needed. 6/2/21   Historical Provider, MD   atorvastatin (LIPITOR) 40 MG tablet Take 1 tablet by mouth nightly 6/12/21   DANIEL Cronin NP   furosemide (LASIX) 40 MG tablet Take 1 tablet by mouth 2 times daily 6/12/21   DANIEL Cronin NP   albuterol (PROVENTIL) (2.5 MG/3ML) 0.083% nebulizer solution USE 1 VIAL IN NEBULIZER EVERY 6 HOURS AS NEEDED FOR WHEEZING FOR SHORTNESS OF BREATH 2/22/21   Historical Provider, MD   apixaban (ELIQUIS) 5 MG TABS tablet Take 1 tablet by mouth twice daily 3/29/21   Historical Provider, MD   budesonide-formoterol (SYMBICORT) 160-4.5 MCG/ACT AERO Inhale 2 puffs into the lungs 2 times daily 10/8/20   Historical Provider, MD   hydrOXYzine (VISTARIL) 25 MG capsule TAKE 1 CAPSULE BY MOUTH EVERY 8 HOURS AS NEEDED FOR ANXIETY 5/6/21   Historical Provider, MD   pregabalin (LYRICA) 50 MG capsule Take 50 mg by mouth 3 times daily. 4/27/21   Historical Provider, MD   traMADol (ULTRAM) 50 MG tablet TAKE 1 TABLET BY MOUTH ONCE DAILY AS NEEDED FOR PAIN  Patient not taking: Reported on 8/3/2021 4/22/21   Historical Provider, MD   zolpidem (AMBIEN) 10 MG tablet Take 10 mg by mouth nightly as needed. 4/27/21   Historical Provider, MD       REVIEW OF SYSTEMS    (2-9 systems for level 4, 10 or more for level 5)      Review of Systems   Constitutional: Positive for chills. Negative for fever. HENT: Negative for congestion. Respiratory: Positive for cough, chest tightness and shortness of breath. Cardiovascular: Positive for chest pain. Negative for leg swelling. Gastrointestinal: Negative for abdominal pain, constipation, diarrhea, nausea and vomiting.    Endocrine: Negative for polyuria. Genitourinary: Negative for difficulty urinating. Musculoskeletal: Positive for myalgias. Skin: Negative for color change. Neurological: Positive for light-headedness. Negative for dizziness, weakness and headaches. Psychiatric/Behavioral: Negative for confusion. PHYSICAL EXAM   (up to 7 for level 4, 8 or more for level 5)      INITIAL VITALS:   /85   Pulse 77   Temp 98.6 °F (37 °C) (Oral)   Resp 16   Wt (!) 350 lb (158.8 kg)   SpO2 93%   BMI 43.75 kg/m²     Physical Exam  Constitutional:       General: He is in acute distress. Appearance: Normal appearance. He is ill-appearing. HENT:      Head: Normocephalic and atraumatic. Mouth/Throat:      Mouth: Mucous membranes are moist.      Pharynx: Oropharynx is clear. Eyes:      Extraocular Movements: Extraocular movements intact. Conjunctiva/sclera: Conjunctivae normal.   Cardiovascular:      Rate and Rhythm: Normal rate and regular rhythm. Pulses: Normal pulses. Heart sounds: Normal heart sounds. No murmur heard. Pulmonary:      Effort: Tachypnea, accessory muscle usage and respiratory distress present. Breath sounds: Decreased breath sounds present. Abdominal:      General: Bowel sounds are normal. There is no distension. Tenderness: There is no abdominal tenderness. There is no guarding. Musculoskeletal:      Comments: Patient states that he has decreased range of motion of the bilateral lower extremities due to muscle pain. Skin:     General: Skin is warm and dry. Findings: No rash (On exposed skin). Neurological:      General: No focal deficit present. Mental Status: He is alert and oriented to person, place, and time.    Psychiatric:         Mood and Affect: Mood normal.         Behavior: Behavior normal.         DIFFERENTIAL  DIAGNOSIS     PLAN (LABS / IMAGING / EKG):  Orders Placed This Encounter   Procedures    COVID-19, Rapid    XR CHEST PORTABLE  CT CHEST PULMONARY EMBOLISM W CONTRAST    VL DUP LOWER EXTREMITY VENOUS BILATERAL    CBC Auto Differential    Basic Metabolic Panel w/ Reflex to MG    Troponin    Brain Natriuretic Peptide    FERRITIN    FIBRINOGEN    D-DIMER, QUANTITATIVE    Procalcitonin    C-REACTIVE PROTEIN    LACTATE DEHYDROGENASE    Troponin    Telemetry monitoring - 24 hour duration    Inpatient consult to IV Team    Inpatient consult to Internal Medicine    Inpatient consult to Infectious Diseases    Pharmacy to Dose: Other - See Comments: Actemra 8 mg per kg x 1    Respiratory Care Evaluation and Treat    CPAP    EKG 12 Lead    PATIENT STATUS (FROM ED OR OR/PROCEDURAL) Inpatient       MEDICATIONS ORDERED:  Orders Placed This Encounter   Medications    albuterol sulfate  (90 Base) MCG/ACT inhaler 2 puff     Order Specific Question:   Initiate RT Bronchodilator Protocol     Answer:   No    fentaNYL (SUBLIMAZE) injection 50 mcg    dexamethasone (DECADRON) injection 6 mg    morphine injection 4 mg    iopamidol (ISOVUE-370) 76 % injection 85 mL    ketorolac (TORADOL) injection 30 mg    dexamethasone (DECADRON) injection 6 mg         DIAGNOSTIC RESULTS / EMERGENCY DEPARTMENT COURSE / MDM   LAB RESULTS:  Results for orders placed or performed during the hospital encounter of 11/12/21   COVID-19, Rapid    Specimen: Nasopharyngeal Swab   Result Value Ref Range    Specimen Description . NASOPHARYNGEAL SWAB     SARS-CoV-2, Rapid DETECTED (A) Not Detected   CBC Auto Differential   Result Value Ref Range    WBC 5.1 3.5 - 11.3 k/uL    RBC 4.91 4.21 - 5.77 m/uL    Hemoglobin 14.6 13.0 - 17.0 g/dL    Hematocrit 46.1 40.7 - 50.3 %    MCV 93.9 82.6 - 102.9 fL    MCH 29.7 25.2 - 33.5 pg    MCHC 31.7 28.4 - 34.8 g/dL    RDW 14.1 11.8 - 14.4 %    Platelets 966 086 - 315 k/uL    MPV 10.4 8.1 - 13.5 fL    NRBC Automated 0.0 0.0 per 100 WBC    Differential Type NOT REPORTED     Seg Neutrophils 59 36 - 65 %    Lymphocytes 26 24 P-R Interval 188 ms    QRS Duration 88 ms    Q-T Interval 394 ms    QTc Calculation (Bazett) 437 ms    P Axis 61 degrees    R Axis -22 degrees    T Axis 43 degrees       RADIOLOGY:  CT CHEST PULMONARY EMBOLISM W CONTRAST         VL DUP LOWER EXTREMITY VENOUS BILATERAL   Final Result      XR CHEST PORTABLE   Final Result   Question mild bilateral patchy bilateral COVID pneumonia. EKG  EKG Interpretation  EKG 11/12/2021  Normal sinus rhythm, ventricular rate 74, normal axis. Interpreted by emergency department physician      Clinical Impression: non-specific EKG    Alecia Lacy DO     All EKG's are interpreted by the Emergency Department Physician who either signs or Co-signs this chart in the absence of a cardiologist.    EMERGENCY DEPARTMENT COURSE:  ED Course as of 11/12/21 1546   Fri Nov 12, 2021   1300 Patient reevaluated, [CR]   1320 Patient unable to obtain CT PE at this time, patient unable to lie flat due to breathing and leg pain. Patient with normal D-dimer, normal Dopplers of lower extremities, PE not likely at this time, will admit for further evaluation work-up. [CR]      ED Course User Index  [CR] Alba Alston DO          PROCEDURES:  None    CONSULTS:  IP CONSULT TO IV TEAM  IP CONSULT TO INTERNAL MEDICINE  IP CONSULT TO INFECTIOUS DISEASES  IP CONSULT TO PHARMACY  IP CONSULT TO CASE MANAGEMENT    MEDICAL DECISION MAKING:  Patient presenting with acute onset of shortness of breath that started this morning with exposure to patients with Covid. Covid swab was noted to be positive. Patient was noted to have negative troponin, patient noted to have a D-dimer of 0.49, concern for DVT and PE on the lower side but patient complains of lower extremity pain and shortness of breath, DVT ultrasound was obtained and was noted to be negative. CT PE scan was attempted to be obtained but patient could not lay flat secondary to pain and shortness of breath.   Patient continued having difficulty shortness of breath. ACS work-up was noted to be negative with normal EKG, troponins that were stable. X-ray demonstrated concern for Covid pneumonia. Patient continued to require 4 L of oxygen who has no oxygen at home. Patient was given pain relief with fentanyl and morphine with minimal improvement. Patient was admitted to the internal medicine team for further work-up and evaluation of COVID-19 pneumonia and COPD exacerbation shortness of breath. CRITICAL CARE:  Please see attending note    FINAL IMPRESSION      1. COPD exacerbation ([address]    2. COVID-19          DISPOSITION / PLAN     DISPOSITION Admitted 11/12/2021 02:06:33 PM      PATIENT REFERRED TO:  No follow-up provider specified.     DISCHARGE MEDICATIONS:  New Prescriptions    No medications on file       Hayde Bear, [address], DO  Emergency Medicine Resident    (Please note that portions of thisnote were completed with a voice recognition program.  Efforts were made to edit the dictations but occasionally words are mis-transcribed.)       Sohail Alonso DO  Resident  11/12/21 [id number]

## 2021-11-12 NOTE — H&P
[facility]     Department of Internal Medicine - Staff Internal Medicine Teaching Service          ADMISSION NOTE/HISTORY AND PHYSICAL EXAMINATION   Date: 11/12/2021  Patient Name: Paul Ramirez  Date of admission: 11/12/2021  7:20 AM  YOB: 1965  PCP: DANIEL Norwood CNP  History Obtained From:  patient, electronic medical record    CHIEF COMPLAINT     Chief complaint: shortness of breath    HISTORY OF PRESENTING ILLNESS     The patient is a pleasant 49 y.o. male presents with a chief complaint of shortness of breath from past 2 days. Patient stated he got exposed to covid from his sister and niece who tested positive for covid 19 - 2 days ago. He also has been having associated cough, non productive, fatigue. He also has difficulty taking deep breaths. Patient also has bilateral LE pain that is chronic for him. Patient stated he does not use home oxygen at home, does use CPAP at home. He stated he is not vaccinated against covid, however in the EMR it shows as vaccinated. Denies any chest pain, fever. PMH of COPD, ROSEY on cpap at home    Patient hemodynamically stable, afebrile  /79, RR 20, on 4 L NC    Labs show   BMP wnl  Blood glucose 116  CRP 90.2  Fibrinogen 578  COVID 19 positive  DVT scan negative        Review of Systems:  Review of Systems   Constitutional: Positive for activity change, chills and fatigue. Negative for appetite change. HENT: Negative for rhinorrhea, sinus pressure and sinus pain. Eyes: Negative for pain, discharge and itching. Respiratory: Positive for cough, shortness of breath and wheezing. Negative for apnea, choking and chest tightness. Cardiovascular: Positive for leg swelling. Negative for chest pain and palpitations. Gastrointestinal: Negative for abdominal pain, constipation and diarrhea. Genitourinary: Negative for difficulty urinating, dysuria and enuresis.    Musculoskeletal: Negative for arthralgias, back pain and gait problem. Skin: Negative for color change and pallor. Neurological: Positive for light-headedness. Negative for dizziness, facial asymmetry, numbness and headaches. Psychiatric/Behavioral: Negative for agitation, behavioral problems and confusion. PAST MEDICAL HISTORY     Past Medical History:   Diagnosis Date    COPD [facility]chronic obstructive [facility] disease) ([facility] [address] [facility]     Diabetes mellitus ([facility] [address] [facility]     ROSEY on CPAP        PAST SURGICAL HISTORY     No past surgical history on file. ALLERGIES     Patient has no known allergies. MEDICATIONS PRIOR TO ADMISSION     Prior to Admission medications    Medication Sig Start Date End Date Taking? Authorizing Provider   predniSONE (DELTASONE) 20 MG tablet Take 2 tablets by mouth every morning till gone. 10/21/21   Karen Duffy MD   oxybutynin (DITROPAN-XL) 10 MG extended release tablet TAKE 1 TABLET BY MOUTH ONCE DAILY 7/19/21   Historical Provider, MD   tamsulosin (FLOMAX) 0.4 MG capsule TAKE 1 CAPSULE BY MOUTH ONCE DAILY 7/19/21   Historical Provider, MD   oxyCODONE-acetaminophen (PERCOCET) 5-325 MG per tablet Take 1 tablet by mouth every 8 hours as needed.  6/2/21   Historical Provider, MD   atorvastatin (LIPITOR) 40 MG tablet Take 1 tablet by mouth nightly 6/12/21   Renetta [place], APRN - NP   furosemide (LASIX) 40 MG tablet Take 1 tablet by mouth 2 times daily 6/12/21   Renetta [place], APRN - NP   albuterol (PROVENTIL) (2.5 MG/3ML) 0.083% nebulizer solution USE 1 VIAL IN NEBULIZER EVERY 6 HOURS AS NEEDED FOR WHEEZING FOR SHORTNESS OF BREATH 2/22/21   Historical Provider, MD   apixaban (ELIQUIS) 5 MG TABS tablet Take 1 tablet by mouth twice daily 3/29/21   Historical Provider, MD   budesonide-formoterol (SYMBICORT) 160-4.5 MCG/ACT AERO Inhale 2 puffs into the lungs 2 times daily 10/8/20   Historical Provider, MD   hydrOXYzine (VISTARIL) 25 MG capsule TAKE 1 CAPSULE BY MOUTH EVERY 8 HOURS AS NEEDED FOR ANXIETY 5/6/21   Historical Provider, MD   pregabalin (LYRICA) 50 MG capsule Take 50 mg by mouth 3 times daily. 21   Historical Provider, MD   traMADol (ULTRAM) 50 MG tablet TAKE 1 TABLET BY MOUTH ONCE DAILY AS NEEDED FOR PAIN  Patient not taking: Reported on 8/3/2021 4/22/21   Historical Provider, MD   zolpidem (AMBIEN) 10 MG tablet Take 10 mg by mouth nightly as needed. 21   Historical Provider, MD       SOCIAL HISTORY     Tobacco: quit 4 months ago, smoked for 30+ years, 1 ppd  Alcohol: none  Illicits: none  Occupation:     FAMILY HISTORY     Family History   Problem Relation Age of Onset    Lung Cancer Mother        PHYSICAL EXAM     Vitals: BP (!) 143/79   Pulse 84   Temp 98.6 °F (37 °C) (Oral)   Resp 20   SpO2 94%   Tmax: Temp (24hrs), Av.6 °F (37 °C), Min:98.6 °F (37 °C), Max:98.6 °F (37 °C)    Last Body weight:   Wt Readings from Last 3 Encounters:   10/21/21 (!) 350 lb (158.8 kg)   21 (!) 350 lb 1.5 oz (158.8 kg)   21 (!) 366 lb 3.2 oz (166.1 kg)     Body Mass Index : There is no height or weight on file to calculate BMI. PHYSICAL EXAMINATION:  Constitutional: This is a well developed, well nourished, Greater than 40 - Morbid Obesity / Extreme Obesity / Grade III 49y.o. year old male who is alert, oriented, cooperative and in no apparent distress. Head:normocephalic and atraumatic. EENT:  PERRLA. No conjunctival injections. Septum was midline, mucosa was without erythema, exudates or cobblestoning. No thrush was noted. Neck: Supple without thyromegaly. No elevated JVP. Trachea was midline. Respiratory: Chest was symmetrical without dullness to percussion. bilateral wheezing present. There is no intercostal retraction or use of accessory muscles. No egophony noted. Cardiovascular: Regular without murmur, clicks, gallops or rubs. Abdomen: Slightly rounded and soft without organomegaly. No rebound, rigidity or guarding was appreciated. Lymphatic: No lymphadenopathy.   Musculoskeletal: Normal curvature of the spine. No gross muscle weakness. Extremities:  No lower extremity edema, ulcerations, tenderness, varicosities or erythema. Muscle size, tone and strength are normal.  No involuntary movements are noted. Skin:  Warm and dry. Good color, turgor and pigmentation. No lesions or scars. No cyanosis or clubbing  Neurological/Psychiatric: The patient's general behavior, level of consciousness, thought content and emotional status is normal.          INVESTIGATIONS     Laboratory Testing:     Recent Results (from the past 24 hour(s))   EKG 12 Lead    Collection Time: 11/12/21  7:29 AM   Result Value Ref Range    Ventricular Rate 74 BPM    Atrial Rate 74 BPM    P-R Interval 188 ms    QRS Duration 88 ms    Q-T Interval 394 ms    QTc Calculation (Bazett) 437 ms    P Axis 61 degrees    R Axis -22 degrees    T Axis 43 degrees   COVID-19, Rapid    Collection Time: 11/12/21  7:48 AM    Specimen: Nasopharyngeal Swab   Result Value Ref Range    Specimen Description . NASOPHARYNGEAL SWAB     SARS-CoV-2, Rapid DETECTED (A) Not Detected   CBC Auto Differential    Collection Time: 11/12/21  8:14 AM   Result Value Ref Range    WBC 5.1 3.5 - 11.3 k/uL    RBC 4.91 4.21 - 5.77 m/uL    Hemoglobin 14.6 13.0 - 17.0 g/dL    Hematocrit 46.1 40.7 - 50.3 %    MCV 93.9 82.6 - 102.9 fL    MCH 29.7 25.2 - 33.5 pg    MCHC 31.7 28.4 - 34.8 g/dL    RDW 14.1 11.8 - 14.4 %    Platelets 033 737 - 764 k/uL    MPV 10.4 8.1 - 13.5 fL    NRBC Automated 0.0 0.0 per 100 WBC    Differential Type NOT REPORTED     Seg Neutrophils 59 36 - 65 %    Lymphocytes 26 24 - 43 %    Monocytes 13 (H) 3 - 12 %    Eosinophils % 2 1 - 4 %    Basophils 0 0 - 2 %    Immature Granulocytes 0 0 %    Segs Absolute 2.91 1.50 - 8.10 k/uL    Absolute Lymph # 1.33 1.10 - 3.70 k/uL    Absolute Mono # 0.68 0.10 - 1.20 k/uL    Absolute Eos # 0.12 0.00 - 0.44 k/uL    Basophils Absolute <0.03 0.00 - 0.20 k/uL    Absolute Immature Granulocyte <0.03 0.00 - 0.30 k/uL    WBC Morphology NOT REPORTED     RBC Morphology NOT REPORTED     Platelet Estimate NOT REPORTED    Basic Metabolic Panel w/ Reflex to MG    Collection Time: 11/12/21  8:14 AM   Result Value Ref Range    Glucose 116 (H) 70 - 99 mg/dL    BUN 13 6 - 20 mg/dL    CREATININE 0.95 0.70 - 1.20 mg/dL    Bun/Cre Ratio NOT REPORTED 9 - 20    Calcium 8.7 8.6 - 10.4 mg/dL    Sodium 140 135 - 144 mmol/L    Potassium 3.9 3.7 - 5.3 mmol/L    Chloride 104 98 - 107 mmol/L    CO2 20 20 - 31 mmol/L    Anion Gap 16 9 - 17 mmol/L    GFR Non-African American >60 >60 mL/min    GFR African American >60 >60 mL/min    GFR Comment          GFR Staging NOT REPORTED    Troponin    Collection Time: 11/12/21  8:14 AM   Result Value Ref Range    Troponin, High Sensitivity 18 0 - 22 ng/L    Troponin T NOT REPORTED <0.03 ng/mL    Troponin Interp NOT REPORTED    Brain Natriuretic Peptide    Collection Time: 11/12/21  8:14 AM   Result Value Ref Range    Pro-BNP 45 <300 pg/mL    BNP Interpretation NOT REPORTED    FERRITIN    Collection Time: 11/12/21  8:14 AM   Result Value Ref Range    Ferritin 267 30 - 400 ug/L   FIBRINOGEN    Collection Time: 11/12/21  8:14 AM   Result Value Ref Range    Fibrinogen 578 (H) 140 - 420 mg/dL   D-DIMER, QUANTITATIVE    Collection Time: 11/12/21  8:14 AM   Result Value Ref Range    D-Dimer, Quant 0.48 mg/L FEU   Procalcitonin    Collection Time: 11/12/21  8:14 AM   Result Value Ref Range    Procalcitonin 0.06 <0.09 ng/mL   C-REACTIVE PROTEIN    Collection Time: 11/12/21  8:14 AM   Result Value Ref Range    CRP 90.2 (H) 0.0 - 5.0 mg/L   LACTATE DEHYDROGENASE    Collection Time: 11/12/21  8:14 AM   Result Value Ref Range     135 - 225 U/L   Troponin    Collection Time: 11/12/21 12:48 PM   Result Value Ref Range    Troponin, High Sensitivity 17 0 - 22 ng/L    Troponin T NOT REPORTED <0.03 ng/mL    Troponin Interp NOT REPORTED        Imaging:   XR CHEST PORTABLE    Result Date: 11/12/2021  Question mild bilateral patchy bilateral COVID pneumonia. ASSESSMENT & PLAN     ASSESSMENT / PLAN:     IMPRESSION  This is a 49 y.o. male who presented with shortness of breath, fatigue, cough and found to have COVID 19. Patient admitted to inpatient status because of covid 19 requiring oxygen    Active Problems:    * No active hospital problems. *    COVID 19 pneumonia  - Currently saturating appropriately on 5 L NC  - Daily CRP: 90.2  - Tested positive:  11/12/21  - Symptom onset:  ?  - Out of Isolation:no  - Vaccinated:  J [employer] 08/21  - Remdesivir: no  - Decadron: 6 mg 11/12  - Actemra/Baricitinib:actemra x 1 - 11/12  - Co-infection: No  - Fluid Balance:  - Glycemic Control:116    COPD   - resumed home med Symbicort, albuterol nebulizer    History of DVT, bilateral LE swelling   - LE doppler negative for doppler  - eliquis at home, will discontinue eliqius as doppler negative for DVT    ROSEY  - use cpap at night    DVT ppx: lovenox  GI ppx: not indicated    PT/OT/SW: consulted  Discharge Planning: consulted    Asael Oshea MD  Internal Medicine Resident, PGY-1  [address], [place]  11/12/2021, 1:59 PM   Attending Physician Statement  I have discussed the care of Carole Wolf, including pertinent history and exam findings,  with the resident. I have seen and examined the patient and the key elements of all parts of the encounter have been performed by me. I agree with the assessment, plan and orders as documented by the resident with additions . Treatment plan Discussed with nursing staff in detail , all questions answered . Electronically signed by Judith Bailey MD on   11/12/21 at 10:22 PM EST  CC time 60 minutes  Please note that this chart was generated using voice recognition Dragon dictation software. Although every effort was made to ensure the accuracy of this automated transcription, some errors in transcription may have occurred.

## 2021-11-12 NOTE — CARE COORDINATION
Case Management Initial Discharge Plan  Paul Ramirez             + covid called patient to discuss discharge plans. Information verified: address, contacts, phone number, , insurance Yes  Insurance Provider: medicaid    Emergency Contact/Next of Kin name & number: sister penny  Who are involved in patient's support system? sister    PCP: DANIEL Norwood CNP  Date of last visit: last month      Discharge Planning    Living Arrangements:    lives with sister    Home has 1 stories  2 stairs to climb to get into front door  Location of bedroom/bathroom in home 1st    Patient able to perform ADL's:Independent    Current Services (outpatient & in home) none  DME equipment: o2 2l, walker, cane, cpap  DME provider: [facility]     Is patient receiving oral anticoagulation therapy?  eliquis          Potential Assistance Needed:   f/u pcp  covid zone: given  Copd zone: given         Evaluation: no         Patient expects to be discharged to:   home    If home: is the family and/or caregiver wiling & able to provide support at home? sister  Who will be providing this support? Family       Transportation provider:   Transportation arrangements needed for discharge: No    Readmission Risk              Risk of Unplanned Readmission:  20             Does patient have a readmission risk score greater than 14?: No  If yes, follow-up appointment must be made within 7 days of discharge.      Goals of Care: safe transition plan       Educated yes on transitional options, provided freedom of choice and are agreeable with plan      Discharge Plan: return home with sister          Electronically signed by Inna Nielsen RN on 21 at 6:09 PM EST

## 2021-11-12 NOTE — ED NOTES
The following labs labeled with pt sticker and tubed to lab:     [x] Blue     [x] Lavender   [] on ice  [x] Green/yellow x2  [x] Green/black [] on ice  [x] Yellow  [] Red  [] Pink      [] COVID-19 swab    [] Rapid  [] PCR  [] Peds Viral Panel     [] Urine Sample  [] Pelvic Cultures  [] Blood Cultures      Ayden Carey RN  11/12/21 9805

## 2021-11-12 NOTE — CONSULTS
[facility] -   Infectious diseases evaluation  admission date 11/12/2021    reason for consultation:   Covid    Impression :   Current:  Covid, pneumonia  Elevated CRP  COPD exacerbation    Other:  ·   Discussion / summary of stay / plan of care   · COPD uses 2 L on and off at home, comes in with a 3-day history of cough and shortness of breath requiring now 5 L of oxygen, chest x-ray read as multifocal small areas of Covid. · CT scan ordered but could not tolerate and hence was not done  · He also complains almost lower extremity severe pain that he does have venous stasis dermatitis with some edema. No open ulcers and no cellulitis   · Suspecting Covid pneumonia with COPD exacerbation, I am not sure why symptoms are 22-year days old unless he has noticed some of the symptom days  due to ongoing COPD. · In the absence of other causes of CRP, he will benefit from Actemra to treat the Covid pneumonia. Recommendations   · ACTEMRA X 1 11/12  · Decadron 6mg daily  · anticoagulation    Infection Control Recommendations   · [place] Precautions  · Contact Isolation   · Airborne isolation  · Droplet Isolation    Antimicrobial Stewardship Recommendations   Off AB    Coordination ofOutpatient Care:   · Estimated Length of IV antimicrobials:  · Patient will need Midline / picc Catheter Insertion:   · Patient will need SNF:  · Patient will need outpatient wound care:     History of Present Illness:   Initial history:  · Magalys Sanford is a 49y.o.-year-old male COPD uses 2 L on and off at home, comes in with a 3-day history of cough and shortness of breath requiring now 5 L of oxygen, chest x-ray read as multifocal small areas of Covid. · CT scan ordered but could not tolerate and hence was not done  He also complains almost lower extremity severe pain that he does have venous stasis dermatitis with some edema.   No open ulcers and no cellulitis   His cough and shortness of breath started about 3 days before admission, he did not have any fever sore throat,  He denies any dysuria. The patient does have a decrease in his appetite. Chest x-ray reviewed, shows small areas of mild fluffy infiltrates  CRP 19 but ferritin is normal, fibrinogen elevated    Interval changes  11/12/2021   Patient Vitals for the past 8 hrs:   BP Pulse Resp SpO2 Weight   11/12/21 1535     (!) 350 lb (158.8 kg)   11/12/21 1532 139/85 77 16 93 %        Summary of relevant labs:  Labs:  WBC5.1   [id number] High    D-Dimer, Quant0.48   Procalcitonin0.06      CRP90.2 High    Ferritin 267    Micro:  covid +  Imaging:  Us doppler legs neg  CXR bilat fluffy infil likely covid pneumonia     I have personally reviewed the past medical history, past surgical history, medications, social history, and family history, and I haveupdated the database accordingly. Allergies:   Patient has no known allergies. Review of Systems:     Review of Systems   Constitutional: Positive for activity change and appetite change. HENT: Positive for congestion. Respiratory: Positive for cough and shortness of breath. Negative for apnea. Cardiovascular: Negative for chest pain. Gastrointestinal: Negative for abdominal distention. Endocrine: Negative for cold intolerance. Genitourinary: Negative for dysuria. Musculoskeletal: Negative for arthralgias. Skin: Positive for color change. Allergic/Immunologic: Negative for immunocompromised state. Neurological: Negative for dizziness. Psychiatric/Behavioral: Negative for agitation. Physical Examination :       Physical Exam  Constitutional:       Appearance: Normal appearance. He is obese. He is ill-appearing. HENT:      Head: Normocephalic and atraumatic. Nose: Nose normal.      Mouth/Throat:      Mouth: Mucous membranes are moist.   Eyes:      General: No scleral icterus.      Conjunctiva/sclera: Conjunctivae normal.   Cardiovascular: Rate and Rhythm: Regular rhythm. Heart sounds: No friction rub. Pulmonary:      Breath sounds: No stridor. Abdominal:      Palpations: There is no mass. Tenderness: There is no abdominal tenderness. Hernia: No hernia is present. Genitourinary:     Comments: No manzano  Musculoskeletal:         General: No swelling or deformity. Cervical back: Neck supple. No rigidity. Skin:     Coloration: Skin is not jaundiced. Findings: No bruising. Neurological:      General: No focal deficit present. Mental Status: He is alert and oriented to person, place, and time. Psychiatric:         Mood and Affect: Mood normal.         Thought Content: Thought content normal.         Past Medical History:     Past Medical History:   Diagnosis Date    [facility]) ([facility] [address] [facility]     Diabetes mellitus ([facility] [address] [facility]     ROSEY on CPAP        Past Surgical  History:   No past surgical history on file.     Medications:      dexamethasone  6 mg IntraVENous Q24H       Social History:     Social History     Socioeconomic History    Marital status: Single     Spouse name: Not on file    Number of children: Not on file    Years of education: Not on file    Highest education level: Not on file   Occupational History    Not on file   Tobacco Use    Smoking status: Former Smoker     Packs/day: 1.00     Years: 25.00     Pack years: 25.00     Types: Cigarettes     Quit date: 2021     Years since quittin.8    Smokeless tobacco: Never Used   Vaping Use    Vaping Use: Never used   Substance and Sexual Activity    Alcohol use: Not Currently    Drug use: Not Currently    Sexual activity: Not on file   Other Topics Concern    Not on file   Social History Narrative    Not on file     Social Determinants of Health     Financial Resource Strain:     Difficulty of Paying Living Expenses: Not on file   Food Insecurity:     Worried About Running Out of Food in the Last Year: Not on file   Morelia Benton Ran Out of Food in the Last Year: Not on file   Transportation Needs:     Lack of Transportation (Medical): Not on file    Lack of Transportation (Non-Medical): Not on file   Physical Activity:     Days of Exercise per Week: Not on file    Minutes of Exercise per Session: Not on file   Stress:     Feeling of Stress : Not on file   Social Connections:     Frequency of Communication with Friends and Family: Not on file    Frequency of Social Gatherings with Friends and Family: Not on file    Attends Denominational Services: Not on file    Active Member of [address] [place] [address] [employer] or Organizations: Not on file    Attends Club or Organization Meetings: Not on file    Marital Status: Not on file   Intimate Partner Violence:     Fear of Current or Ex-Partner: Not on file    Emotionally Abused: Not on file    Physically Abused: Not on file    Sexually Abused: Not on file   Housing Stability:     Unable to Pay for Housing in the Last Year: Not on file    Number of [place] in the Last Year: Not on file    Unstable Housing in the Last Year: Not on file       Family History:     Family History   Problem Relation Age of Onset    Lung Cancer Mother       Medical Decision Making:   I have independently reviewed/ordered the following labs:    CBC with Differential:   Recent Labs     11/12/21  0814   WBC 5.1   HGB 14.6   HCT 46.1      LYMPHOPCT 26   MONOPCT 13*     BMP:  Recent Labs     11/12/21  0814      K 3.9      CO2 20   BUN 13   CREATININE 0.95     Hepatic Function Panel: No results for input(s): PROT, LABALBU, BILIDIR, IBILI, BILITOT, ALKPHOS, ALT, AST in the last 72 hours. No results for input(s): RPR in the last 72 hours. No results for input(s): HIV in the last 72 hours. No results for input(s): BC in the last 72 hours. Lab Results   Component Value Date    CREATININE 0.95 11/12/2021    GLUCOSE 116 11/12/2021       Detailed results:         Thank you for allowing us to participate in the care of this patient. Please call with questions. This note is created with the assistance of a speech recognition program.  While intending to generate adocument that actually reflects the content of the visit, the document can still have some errors including those of syntax and sound a like substitutions which may escape proof reading. It such instances, actual meaningcan be extrapolated by contextual diversion.     Lucrecia Cevallos MD  Office: [phone]  Perfect serve / office [phone]

## 2021-11-12 NOTE — ED NOTES
Patient returned to ED room 30 from CT via Hiral Wolf [address] [place] [address]  11/12/21 8574

## 2021-11-13 LAB
ABSOLUTE EOS #: 0 K/UL (ref 0–0.44)
ABSOLUTE IMMATURE GRANULOCYTE: 0.02 K/UL (ref 0–0.3)
ABSOLUTE LYMPH #: 0.38 K/UL (ref 1.1–3.7)
ABSOLUTE MONO #: 0.19 K/UL (ref 0.1–1.2)
ANION GAP SERPL CALCULATED.3IONS-SCNC: 9 MMOL/L (ref 9–17)
BASOPHILS # BLD: 0 % (ref 0–2)
BASOPHILS ABSOLUTE: 0 K/UL (ref 0–0.2)
BUN BLDV-MCNC: 16 MG/DL (ref 6–20)
BUN/CREAT BLD: ABNORMAL (ref 9–20)
C-REACTIVE PROTEIN: 65.4 MG/L (ref 0–5)
CALCIUM SERPL-MCNC: 8.8 MG/DL (ref 8.6–10.4)
CHLORIDE BLD-SCNC: 107 MMOL/L (ref 98–107)
CO2: 20 MMOL/L (ref 20–31)
CREAT SERPL-MCNC: 0.76 MG/DL (ref 0.7–1.2)
DIFFERENTIAL TYPE: ABNORMAL
EKG ATRIAL RATE: 74 BPM
EKG P AXIS: 61 DEGREES
EKG P-R INTERVAL: 188 MS
EKG Q-T INTERVAL: 394 MS
EKG QRS DURATION: 88 MS
EKG QTC CALCULATION (BAZETT): 437 MS
EKG R AXIS: -22 DEGREES
EKG T AXIS: 43 DEGREES
EKG VENTRICULAR RATE: 74 BPM
EOSINOPHILS RELATIVE PERCENT: 0 % (ref 1–4)
FERRITIN: 262 UG/L (ref 30–400)
GFR AFRICAN AMERICAN: >60 ML/MIN
GFR NON-AFRICAN AMERICAN: >60 ML/MIN
GFR SERPL CREATININE-BSD FRML MDRD: ABNORMAL ML/MIN/{1.73_M2}
GFR SERPL CREATININE-BSD FRML MDRD: ABNORMAL ML/MIN/{1.73_M2}
GLUCOSE BLD-MCNC: 160 MG/DL (ref 75–110)
GLUCOSE BLD-MCNC: 187 MG/DL (ref 75–110)
GLUCOSE BLD-MCNC: 189 MG/DL (ref 70–99)
HCT VFR BLD CALC: 45.5 % (ref 40.7–50.3)
HEMOGLOBIN: 14.2 G/DL (ref 13–17)
IMMATURE GRANULOCYTES: 1 %
LYMPHOCYTES # BLD: 18 % (ref 24–43)
MCH RBC QN AUTO: 29.3 PG (ref 25.2–33.5)
MCHC RBC AUTO-ENTMCNC: 31.2 G/DL (ref 28.4–34.8)
MCV RBC AUTO: 94 FL (ref 82.6–102.9)
MONOCYTES # BLD: 9 % (ref 3–12)
MORPHOLOGY: NORMAL
NRBC AUTOMATED: 0 PER 100 WBC
PDW BLD-RTO: 13.8 % (ref 11.8–14.4)
PLATELET # BLD: 191 K/UL (ref 138–453)
PLATELET ESTIMATE: ABNORMAL
PMV BLD AUTO: 10.5 FL (ref 8.1–13.5)
POTASSIUM SERPL-SCNC: 4.4 MMOL/L (ref 3.7–5.3)
RBC # BLD: 4.84 M/UL (ref 4.21–5.77)
RBC # BLD: ABNORMAL 10*6/UL
SEG NEUTROPHILS: 72 % (ref 36–65)
SEGMENTED NEUTROPHILS ABSOLUTE COUNT: 1.51 K/UL (ref 1.5–8.1)
SODIUM BLD-SCNC: 136 MMOL/L (ref 135–144)
WBC # BLD: 2.1 K/UL (ref 3.5–11.3)
WBC # BLD: ABNORMAL 10*3/UL

## 2021-11-13 PROCEDURE — 99291 CRITICAL CARE FIRST HOUR: CPT | Performed by: INTERNAL MEDICINE

## 2021-11-13 PROCEDURE — 6360000002 HC RX W HCPCS: Performed by: INTERNAL MEDICINE

## 2021-11-13 PROCEDURE — 80048 BASIC METABOLIC PNL TOTAL CA: CPT

## 2021-11-13 PROCEDURE — 2700000000 HC OXYGEN THERAPY PER DAY

## 2021-11-13 PROCEDURE — APPSS30 APP SPLIT SHARED TIME 16-30 MINUTES: Performed by: NURSE PRACTITIONER

## 2021-11-13 PROCEDURE — 6370000000 HC RX 637 (ALT 250 FOR IP)

## 2021-11-13 PROCEDURE — 85025 COMPLETE CBC W/AUTO DIFF WBC: CPT

## 2021-11-13 PROCEDURE — 82947 ASSAY GLUCOSE BLOOD QUANT: CPT

## 2021-11-13 PROCEDURE — 6370000000 HC RX 637 (ALT 250 FOR IP): Performed by: STUDENT IN AN ORGANIZED HEALTH CARE EDUCATION/TRAINING PROGRAM

## 2021-11-13 PROCEDURE — 2580000003 HC RX 258: Performed by: STUDENT IN AN ORGANIZED HEALTH CARE EDUCATION/TRAINING PROGRAM

## 2021-11-13 PROCEDURE — 36415 COLL VENOUS BLD VENIPUNCTURE: CPT

## 2021-11-13 PROCEDURE — 86140 C-REACTIVE PROTEIN: CPT

## 2021-11-13 PROCEDURE — 99232 SBSQ HOSP IP/OBS MODERATE 35: CPT | Performed by: INTERNAL MEDICINE

## 2021-11-13 PROCEDURE — 94761 N-INVAS EAR/PLS OXIMETRY MLT: CPT

## 2021-11-13 PROCEDURE — 82728 ASSAY OF FERRITIN: CPT

## 2021-11-13 PROCEDURE — 1200000000 HC SEMI PRIVATE

## 2021-11-13 PROCEDURE — 94640 AIRWAY INHALATION TREATMENT: CPT

## 2021-11-13 RX ORDER — DEXTROSE MONOHYDRATE 50 MG/ML
100 INJECTION, SOLUTION INTRAVENOUS PRN
Status: DISCONTINUED | OUTPATIENT
Start: 2021-11-13 | End: 2021-11-15 | Stop reason: HOSPADM

## 2021-11-13 RX ORDER — ALBUTEROL SULFATE 90 UG/1
2 AEROSOL, METERED RESPIRATORY (INHALATION) 4 TIMES DAILY
Status: DISCONTINUED | OUTPATIENT
Start: 2021-11-13 | End: 2021-11-15 | Stop reason: HOSPADM

## 2021-11-13 RX ORDER — NICOTINE POLACRILEX 4 MG
15 LOZENGE BUCCAL PRN
Status: DISCONTINUED | OUTPATIENT
Start: 2021-11-13 | End: 2021-11-15 | Stop reason: HOSPADM

## 2021-11-13 RX ORDER — DEXTROSE MONOHYDRATE 25 G/50ML
12.5 INJECTION, SOLUTION INTRAVENOUS PRN
Status: DISCONTINUED | OUTPATIENT
Start: 2021-11-13 | End: 2021-11-15 | Stop reason: HOSPADM

## 2021-11-13 RX ADMIN — DEXAMETHASONE SODIUM PHOSPHATE 6 MG: 10 INJECTION INTRAMUSCULAR; INTRAVENOUS at 18:48

## 2021-11-13 RX ADMIN — ALBUTEROL SULFATE 2 PUFF: 90 AEROSOL, METERED RESPIRATORY (INHALATION) at 09:33

## 2021-11-13 RX ADMIN — BUDESONIDE AND FORMOTEROL FUMARATE DIHYDRATE 2 PUFF: 160; 4.5 AEROSOL RESPIRATORY (INHALATION) at 21:39

## 2021-11-13 RX ADMIN — DESMOPRESSIN ACETATE 40 MG: 0.2 TABLET ORAL at 21:00

## 2021-11-13 RX ADMIN — ALBUTEROL SULFATE 2 PUFF: 90 AEROSOL, METERED RESPIRATORY (INHALATION) at 15:35

## 2021-11-13 RX ADMIN — IPRATROPIUM BROMIDE 2 PUFF: 17 AEROSOL, METERED RESPIRATORY (INHALATION) at 15:36

## 2021-11-13 RX ADMIN — IPRATROPIUM BROMIDE 2 PUFF: 17 AEROSOL, METERED RESPIRATORY (INHALATION) at 09:34

## 2021-11-13 RX ADMIN — IPRATROPIUM BROMIDE 2 PUFF: 17 AEROSOL, METERED RESPIRATORY (INHALATION) at 11:47

## 2021-11-13 RX ADMIN — IPRATROPIUM BROMIDE 2 PUFF: 17 AEROSOL, METERED RESPIRATORY (INHALATION) at 21:39

## 2021-11-13 RX ADMIN — BUDESONIDE AND FORMOTEROL FUMARATE DIHYDRATE 2 PUFF: 160; 4.5 AEROSOL RESPIRATORY (INHALATION) at 09:34

## 2021-11-13 RX ADMIN — APIXABAN 5 MG: 5 TABLET, FILM COATED ORAL at 21:00

## 2021-11-13 RX ADMIN — ALBUTEROL SULFATE 2 PUFF: 90 AEROSOL, METERED RESPIRATORY (INHALATION) at 11:47

## 2021-11-13 RX ADMIN — INSULIN LISPRO 1 UNITS: 100 INJECTION, SOLUTION INTRAVENOUS; SUBCUTANEOUS at 17:13

## 2021-11-13 RX ADMIN — APIXABAN 5 MG: 5 TABLET, FILM COATED ORAL at 09:57

## 2021-11-13 RX ADMIN — SODIUM CHLORIDE, PRESERVATIVE FREE 10 ML: 5 INJECTION INTRAVENOUS at 21:00

## 2021-11-13 RX ADMIN — INSULIN LISPRO 1 UNITS: 100 INJECTION, SOLUTION INTRAVENOUS; SUBCUTANEOUS at 12:24

## 2021-11-13 RX ADMIN — ALBUTEROL SULFATE 2 PUFF: 90 AEROSOL, METERED RESPIRATORY (INHALATION) at 21:40

## 2021-11-13 ASSESSMENT — PAIN SCALES - GENERAL: PAINLEVEL_OUTOF10: 0

## 2021-11-13 ASSESSMENT — ENCOUNTER SYMPTOMS
COLOR CHANGE: 1
ABDOMINAL DISTENTION: 0
APNEA: 0
SHORTNESS OF BREATH: 1
COUGH: 1

## 2021-11-13 NOTE — PLAN OF CARE
Problem: Airway Clearance - Ineffective  Goal: Achieve or maintain patent airway  11/13/2021 0450 by Hyun Long RCP  Outcome: Ongoing     Problem: Gas Exchange - Impaired  Goal: Absence of hypoxia  11/13/2021 0450 by Hyun Long RCP  Outcome: Ongoing     Problem: Gas Exchange - Impaired  Goal: Promote optimal lung function  11/13/2021 0450 by Hyun Long RCP  Outcome: Ongoing     Problem: Breathing Pattern - Ineffective  Goal: Ability to achieve and maintain a regular respiratory rate  11/13/2021 0450 by Hyun Long RCP  Outcome: Ongoing

## 2021-11-13 NOTE — PROGRESS NOTES
[facility] -   Infectious diseases evaluation  admission date 11/12/2021    reason for consultation:   Covid    Impression :   Current:  Covid, pneumonia  Elevated CRP  COPD exacerbation    Other:  ·   Discussion / summary of stay / plan of care   · COPD uses 2 L on and off at home, comes in with a 3-day history of cough and shortness of breath requiring now 5 L of oxygen, chest x-ray read as multifocal small areas of Covid. · CT scan ordered but could not tolerate and hence was not done  · He also complains almost lower extremity severe pain that he does have venous stasis dermatitis with some edema. No open ulcers and no cellulitis   · Suspecting Covid pneumonia with COPD exacerbation, I am not sure why symptoms are 22-year days old unless he has noticed some of the symptom days  due to ongoing COPD. · In the absence of other causes of CRP, he will benefit from Actemra to treat the Covid pneumonia. Recommendations   · ACTEMRA X 1 11/12  · Decadron 6mg daily  · anticoagulation    Infection Control Recommendations   · [place] Precautions  · Contact Isolation   · Airborne isolation  · Droplet Isolation    Antimicrobial Stewardship Recommendations   Off AB    Coordination ofOutpatient Care:   · Estimated Length of IV antimicrobials:  · Patient will need Midline / picc Catheter Insertion:   · Patient will need SNF:  · Patient will need outpatient wound care:     History of Present Illness:   Initial history:  · Miguel Ángel Willoughby is a 49y.o.-year-old male COPD uses 2 L on and off at home, comes in with a 3-day history of cough and shortness of breath requiring now 5 L of oxygen, chest x-ray read as multifocal small areas of Covid. · CT scan ordered but could not tolerate and hence was not done  He also complains almost lower extremity severe pain that he does have venous stasis dermatitis with some edema.   No open ulcers and no cellulitis   His cough and shortness of breath started about 3 days before admission, he did not have any fever sore throat,  He denies any dysuria. The patient does have a decrease in his appetite. Chest x-ray reviewed, shows small areas of mild fluffy infiltrates  CRP 19 but ferritin is normal, fibrinogen elevated    Interval changes  11/13/2021   Patient Vitals for the past 8 hrs:   BP Temp Temp src Pulse Resp SpO2 Weight   11/13/21 0939     18 93 %    11/13/21 0938     16 93 %    11/13/21 0800 131/70 98.4 °F (36.9 °C) Oral 59 13     11/13/21 0548       (!) 350 lb (158.8 kg)   11/13/21 0400 (!) 141/88   62 12       11/13  Afebrile  VS stable on 4 L NC    Patient is alert and oriented      Summary of relevant labs:  Labs:  WBC5.1-->2.1  [id number] High    D-Dimer, Quant0.48   Procalcitonin0.06      CRP90.2 High    Ferritin 267    Micro:  covid +  Imaging:  Us doppler legs neg  CXR bilat fluffy infil likely covid pneumonia       I have personally reviewed the past medical history, past surgical history, medications, social history, and family history, and I haveupdated the database accordingly. Allergies:   Patient has no known allergies. Review of Systems:     Review of Systems   Constitutional: Positive for activity change and appetite change. HENT: Positive for congestion. Respiratory: Positive for cough and shortness of breath. Negative for apnea. Cardiovascular: Negative for chest pain. Gastrointestinal: Negative for abdominal distention. Endocrine: Negative for cold intolerance. Genitourinary: Negative for dysuria. Musculoskeletal: Negative for arthralgias. Skin: Positive for color change. Allergic/Immunologic: Negative for immunocompromised state. Neurological: Negative for dizziness. Psychiatric/Behavioral: Negative for agitation. Physical Examination :       Physical Exam  Constitutional:       Appearance: Normal appearance. He is obese. He is ill-appearing.    HENT: Head: Normocephalic and atraumatic. Nose: Nose normal.      Mouth/Throat:      Mouth: Mucous membranes are moist.   Eyes:      General: No scleral icterus. Conjunctiva/sclera: Conjunctivae normal.   Cardiovascular:      Rate and Rhythm: Regular rhythm. Heart sounds: No friction rub. Pulmonary:      Breath sounds: No stridor. Abdominal:      Palpations: There is no mass. Tenderness: There is no abdominal tenderness. Hernia: No hernia is present. Genitourinary:     Comments: No manzano  Musculoskeletal:         General: No swelling or deformity. Cervical back: Neck supple. No rigidity. Skin:     Coloration: Skin is not jaundiced. Findings: No bruising. Neurological:      General: No focal deficit present. Mental Status: He is alert and oriented to person, place, and time. Psychiatric:         Mood and Affect: Mood normal.         Thought Content: Thought content normal.         Past Medical History:     Past Medical History:   Diagnosis Date    [facility]chronic obstructive pulmonary disease) [facility]     Diabetes mellitus ([facility] [facility]     ROSEY on CPAP        Past Surgical  History:   No past surgical history on file.     Medications:      albuterol sulfate HFA  2 puff Inhalation 4x daily    apixaban  5 mg Oral BID    insulin lispro  0-6 Units SubCUTAneous TID WC    insulin lispro  0-3 Units SubCUTAneous Nightly    atorvastatin  40 mg Oral Nightly    budesonide-formoterol  2 puff Inhalation BID    sodium chloride flush  10 mL IntraVENous 2 times per day    dexamethasone  6 mg IntraVENous Q24H    ipratropium  2 puff Inhalation 4x daily       Social History:     Social History     Socioeconomic History    Marital status: Single     Spouse name: Not on file    Number of children: Not on file    Years of education: Not on file    Highest education level: Not on file   Occupational History    Not on file   Tobacco Use    Smoking status: Former Smoker     Packs/day: 1.00 Years: 25.00     Pack years: 25.00     Types: Cigarettes     Quit date: 2021     Years since quittin.8    Smokeless tobacco: Never Used   Vaping Use    Vaping Use: Never used   Substance and Sexual Activity    Alcohol use: Not Currently    Drug use: Not Currently    Sexual activity: Not on file   Other Topics Concern    Not on file   Social History Narrative    Not on file     Social Determinants of Health     Financial Resource Strain:     Difficulty of Paying Living Expenses: Not on file   Food Insecurity:     Worried About Running Out of Food in the Last Year: Not on file    Bony of Food in the Last Year: Not on file   Transportation Needs:     Lack of Transportation (Medical): Not on file    Lack of Transportation (Non-Medical):  Not on file   Physical Activity:     Days of Exercise per Week: Not on file    Minutes of Exercise per Session: Not on file   Stress:     Feeling of Stress : Not on file   Social Connections:     Frequency of Communication with Friends and Family: Not on file    Frequency of Social Gatherings with Friends and Family: Not on file    Attends Baptism Services: Not on file    Active Member of [address] [place] [address] or Organizations: Not on file    Attends Club or Organization Meetings: Not on file    Marital Status: Not on file   Intimate Partner Violence:     Fear of Current or Ex-Partner: Not on file    Emotionally Abused: Not on file    Physically Abused: Not on file    Sexually Abused: Not on file   Housing Stability:     Unable to Pay for Housing in the Last Year: Not on file    Number of [place] in the Last Year: Not on file    Unstable Housing in the Last Year: Not on file       Family History:     Family History   Problem Relation Age of Onset    Lung Cancer Mother       Medical Decision Making:   I have independently reviewed/ordered the following labs:    CBC with Differential:   Recent Labs     21  0814 21  0742   WBC 5.1 2.1*   HGB 14.6

## 2021-11-13 NOTE — PLAN OF CARE
1646 by Slim Kaur RN  Outcome: Ongoing  Goal: Maintain absence of muscle cramping  11/12/2021 2029 by Pipe Chacko RN  Outcome: Ongoing  11/12/2021 1646 by Slim Kaur RN  Outcome: Ongoing  Goal: Maintain normal serum potassium, sodium, calcium, phosphorus, and pH  11/12/2021 2029 by Pipe Chacko RN  Outcome: Ongoing  11/12/2021 1646 by Slim Kaur RN  Outcome: Ongoing     Problem: Loneliness or Risk for Loneliness  Goal: Demonstrate positive use of time alone when socialization is not possible  11/12/2021 2029 by Pipe Chacko RN  Outcome: Ongoing  11/12/2021 1646 by Slim Kaur RN  Outcome: Ongoing     Problem: Fatigue  Goal: Verbalize increase energy and improved vitality  11/12/2021 2029 by Pipe Chacko RN  Outcome: Ongoing  11/12/2021 1646 by Slim Kaur RN  Outcome: Ongoing     Problem: Patient Education: Go to Patient Education Activity  Goal: Patient/Family Education  11/12/2021 2029 by Pipe Chacko RN  Outcome: Ongoing  11/12/2021 1646 by Slim Kaur RN  Outcome: Ongoing     Problem: Skin Integrity:  Goal: Will show no infection signs and symptoms  Description: Will show no infection signs and symptoms  11/12/2021 2029 by Pipe Chacko RN  Outcome: Ongoing  11/12/2021 1646 by Slim Kaur RN  Outcome: Ongoing  Goal: Absence of new skin breakdown  Description: Absence of new skin breakdown  11/12/2021 2029 by Pipe Chacko RN  Outcome: Ongoing  11/12/2021 1646 by Slim Kaur RN  Outcome: Ongoing     Problem: Falls - Risk of:  Goal: Will remain free from falls  Description: Will remain free from falls  11/12/2021 2029 by Pipe Chacko RN  Outcome: Ongoing  11/12/2021 1646 by Slim Kaur RN  Outcome: Ongoing  Goal: Absence of physical injury  Description: Absence of physical injury  11/12/2021 2029 by Pipe Chacko RN  Outcome: Ongoing  11/12/2021 1646 by Slim Kaur RN  Outcome: Ongoing     Problem: Confusion - Acute:  Goal: Absence of continued neurological deterioration signs and symptoms  Description: Absence of continued neurological deterioration signs and symptoms  11/12/2021 2029 by Ximena Franco RN  Outcome: Ongoing  11/12/2021 1646 by Will Lopes RN  Outcome: Ongoing  Goal: Mental status will be restored to baseline  Description: Mental status will be restored to baseline  11/12/2021 2029 by Ximena Franco RN  Outcome: Ongoing  11/12/2021 1646 by Will Lopes RN  Outcome: Ongoing     Problem: Discharge Planning:  Goal: Ability to perform activities of daily living will improve  Description: Ability to perform activities of daily living will improve  11/12/2021 2029 by Ximena Franco RN  Outcome: Ongoing  11/12/2021 1646 by Will Lopes RN  Outcome: Ongoing  Goal: Participates in care planning  Description: Participates in care planning  11/12/2021 2029 by Ximena Franco RN  Outcome: Ongoing  11/12/2021 1646 by Will Lopes RN  Outcome: Ongoing     Problem: Injury - Risk of, Physical Injury:  Goal: Will remain free from falls  Description: Will remain free from falls  11/12/2021 2029 by Ximena Franco RN  Outcome: Ongoing  11/12/2021 1646 by Will Lopes RN  Outcome: Ongoing  Goal: Absence of physical injury  Description: Absence of physical injury  11/12/2021 2029 by Ximena Franco RN  Outcome: Ongoing  11/12/2021 1646 by Will Lopes RN  Outcome: Ongoing     Problem: Mood - Altered:  Goal: Mood stable  Description: Mood stable  11/12/2021 2029 by Ximena Franco RN  Outcome: Ongoing  11/12/2021 1646 by Will Lopes RN  Outcome: Ongoing  Goal: Absence of abusive behavior  Description: Absence of abusive behavior  11/12/2021 2029 by Ximena Franco RN  Outcome: Ongoing  11/12/2021 1646 by Will Lopes RN  Outcome: Ongoing  Goal: Verbalizations of feeling emotionally comfortable while being cared for will increase  Description: Verbalizations of feeling emotionally comfortable while being cared for will increase  11/12/2021 2029 by Scarlett Horton RN  Outcome: Ongoing  11/12/2021 1646 by Ella Hathaway RN  Outcome: Ongoing     Problem: Psychomotor Activity - Altered:  Goal: Absence of psychomotor disturbance signs and symptoms  Description: Absence of psychomotor disturbance signs and symptoms  11/12/2021 2029 by Scarlett Horton RN  Outcome: Ongoing  11/12/2021 1646 by Ella Hathaway RN  Outcome: Ongoing     Problem: Sensory Perception - Impaired:  Goal: Demonstrations of improved sensory functioning will increase  Description: Demonstrations of improved sensory functioning will increase  11/12/2021 2029 by Scarlett Horton RN  Outcome: Ongoing  11/12/2021 1646 by Ella Hathaway RN  Outcome: Ongoing  Goal: Decrease in sensory misperception frequency  Description: Decrease in sensory misperception frequency  11/12/2021 2029 by Scarlett Horton RN  Outcome: Ongoing  11/12/2021 1646 by Ella Hathaway RN  Outcome: Ongoing  Goal: Able to refrain from responding to false sensory perceptions  Description: Able to refrain from responding to false sensory perceptions  11/12/2021 2029 by Scarlett Horton RN  Outcome: Ongoing  11/12/2021 1646 by Ella Hathaway RN  Outcome: Ongoing  Goal: Demonstrates accurate environmental perceptions  Description: Demonstrates accurate environmental perceptions  11/12/2021 2029 by Scarlett Horton RN  Outcome: Ongoing  11/12/2021 1646 by Ella Hathaway RN  Outcome: Ongoing  Goal: Able to distinguish between reality-based and nonreality-based thinking  Description: Able to distinguish between reality-based and nonreality-based thinking  11/12/2021 2029 by Scarlett Horton RN  Outcome: Ongoing  11/12/2021 1646 by Ella Hathaway RN  Outcome: Ongoing  Goal: Able to interrupt nonreality-based thinking  Description: Able to interrupt nonreality-based thinking  11/12/2021 2029 by Scarlett Horton RN  Outcome: Ongoing  11/12/2021 1646 by Ella Hathaway RN  Outcome: Ongoing     Problem: Sleep Pattern Disturbance:  Goal: Appears well-rested  Description: Appears well-rested  11/12/2021 2029 by Roel Rhodes RN  Outcome: Ongoing  11/12/2021 1646 by Mavis Rooney RN  Outcome: Ongoing

## 2021-11-13 NOTE — PROGRESS NOTES
Senior Note:      82-year-old male came in with worsening dyspnea, cough requiring 5 L oxygen. Has past medical history of ROSEY on CPAP, COPD, history of DVT on Eliquis. In the ER patient was tested positive for Covid pneumonia received IV Decadron 10 mg and 1 dose of Actemra as per ID recommendations. CRP 90.2    Of note, patient was on Eliquis for DVT however Dopplers done this admission shows no evidence of DVT so we will stop Eliquis. Hospital Problems           Last Modified POA    * (Principal) Pneumonia due to COVID-19 virus 11/12/2021 Yes    Diabetes mellitus type 2, controlled ([facility] [address] 11/12/2021 Yes    GERD (gastroesophageal reflux disease) 11/12/2021 Yes    Sleep apnea 11/12/2021 Yes    Class 3 severe obesity with serious comorbidity in adult [facility]) 11/12/2021 Yes        Plan:    -Continue IV Decadron 6 mg daily.  -Droplet precautions  -Continue DuoNeb and Symbicort breathing treatment.     Alexandra Lundberg MD  PGY-3 Internal Medicine Resident  [address] [place] [address]  11/12/2021 8:22 PM

## 2021-11-13 NOTE — PROGRESS NOTES
[facility]  Internal Medicine Teaching Residency Program  Inpatient Daily Progress Note  ______________________________________________________________________________    Patient: Gamal Ya  YOB: 1965   VOZ:[id number]    Acct: [de-identified]     Room: 2016/2016-01  Admit date: 11/12/2021  Today's date: 11/12/21  Number of days in the hospital: 0    SUBJECTIVE   Admitting Diagnosis: Pneumonia due to COVID-19 virus  CC: shortness of breath  Pt examined at bedside. Chart & results reviewed. Patient hemodynamically stable, afebrile /88 in am; on 4 L NC  Bmp wnl  Blood glucose 189  Wbc 2.1 in am from 5.1  Continue decadron as per ID  LE edema present    ROS:  Constitutional:  negative for chills, fevers, sweats  Respiratory:  negative for cough, positive for shortness of breath and wheezing  Cardiovascular:  negative for chest pain, chest pressure/discomfort, palpitations  Gastrointestinal:  negative for abdominal pain, constipation, diarrhea, nausea, vomiting  Neurological:  negative for dizziness, headache  BRIEF HISTORY     The patient is a pleasant 49 y.o. male presents with a chief complaint of shortness of breath from past 2 days. Patient stated he got exposed to covid from his sister and niece who tested positive for covid 19 - 2 days ago. He also has been having associated cough, non productive, fatigue. He also has difficulty taking deep breaths. Patient also has bilateral LE pain that is chronic for him.     Patient stated he does not use home oxygen at home, does use CPAP at home. He stated he is not vaccinated against covid, however in the EMR it shows as vaccinated.     Denies any chest pain, fever.      PMH of COPD, ROSEY on cpap at home     Patient hemodynamically stable, afebrile  /79, RR 20, on 4 L NC     Labs show   BMP wnl  Blood glucose 116  CRP 90.2  Fibrinogen 578  COVID 19 positive  DVT scan negative    OBJECTIVE     Vital Signs: BP (!) 146/96   Pulse 79   Temp 98.1 °F (36.7 °C) (Axillary)   Resp 17   Wt (!) 346 lb 1.6 oz (157 kg)   SpO2 96%   BMI 43.26 kg/m²     Temp (24hrs), Av.3 °F (36.8 °C), Min:98.1 °F (36.7 °C), Max:98.6 °F (37 °C)    No intake/output data recorded. Physical Exam:  Constitutional: This is a well developed, well nourished, Greater than 40 - Morbid Obesity / Extreme Obesity / Grade III 49y.o. year old male who is alert, oriented, cooperative and in no apparent distress. Head:normocephalic and atraumatic. EENT:  PERRLA. No thrush was noted. Neck: Supple without thyromegaly. No elevated JVP. Respiratory: Chest was symmetrical without dullness to percussion. Breath sounds bilaterally were clear to auscultation. Wheezing present  Cardiovascular: Regular without murmur, clicks, gallops or rubs. Abdomen: Slightly rounded and soft without organomegaly. No rebound, rigidity or guarding was appreciated. Lymphatic: No lymphadenopathy. Extremities:  Bilateral LE edema present. Muscle size, tone and strength are normal.  No involuntary movements are noted. Skin:  Warm and dry. Good color, turgor and pigmentation. No lesions or scars.   No cyanosis or clubbing  Neurological/Psychiatric: The patient's general behavior, level of consciousness, thought content and emotional status is normal.        Medications:  Scheduled Medications:    albuterol  2.5 mg Nebulization 4x daily    atorvastatin  40 mg Oral Nightly    budesonide-formoterol  2 puff Inhalation BID    sodium chloride flush  10 mL IntraVENous 2 times per day    dexamethasone  6 mg IntraVENous Q24H    enoxaparin  40 mg SubCUTAneous Daily    ipratropium  2 puff Inhalation 4x daily     Continuous Infusions:    sodium chloride      sodium chloride 75 mL/hr at 21 1820     PRN Medicationsalbuterol sulfate HFA, 2 puff, Q4H PRN  iopamidol, 85 mL, ONCE PRN  zolpidem, 10 mg, Nightly PRN  sodium chloride flush, 10 mL, PRN  sodium chloride, 25 mL, PRN  magnesium sulfate, 1,000 mg, PRN  ondansetron, 4 mg, Q8H PRN   Or  ondansetron, 4 mg, Q6H PRN  magnesium hydroxide, 30 mL, Daily PRN  senna, 5 mL, BID PRN  acetaminophen, 650 mg, Q6H PRN   Or  acetaminophen, 650 mg, Q6H PRN        Diagnostic Labs:  CBC:   Recent Labs     11/12/21  0814   WBC 5.1   RBC 4.91   HGB 14.6   HCT 46.1   MCV 93.9   RDW 14.1        BMP:   Recent Labs     11/12/21  0814      K 3.9      CO2 20   BUN 13   CREATININE 0.95     BNP: No results for input(s): BNP in the last 72 hours. PT/INR: No results for input(s): PROTIME, INR in the last 72 hours. APTT: No results for input(s): APTT in the last 72 hours. CARDIAC ENZYMES: No results for input(s): CKMB, CKMBINDEX, TROPONINI in the last 72 hours. Invalid input(s): CKTOTAL;3  FASTING LIPID PANEL:No results found for: CHOL, HDL, TRIG  LIVER PROFILE: No results for input(s): AST, ALT, ALB, BILIDIR, BILITOT, ALKPHOS in the last 72 hours. MICROBIOLOGY:   Lab Results   Component Value Date/Time    CULTURE NORMAL RESPIRATORY CARLOS ALBERTO HEAVY GROWTH 06/10/2021 01:48 PM       Imaging:    XR CHEST PORTABLE    Result Date: 11/12/2021  Question mild bilateral patchy bilateral COVID pneumonia. ASSESSMENT & PLAN     ASSESSMENT / PLAN:     Principal Problem:    Pneumonia due to COVID-19 virus  Active Problems:    Diabetes mellitus type 2, controlled (HCC)    GERD (gastroesophageal reflux disease)    Sleep apnea    Class 3 severe obesity with serious comorbidity in adult [facility])  Resolved Problems:    * No resolved hospital problems.  *    COVID 19 pneumonia  - Currently saturating appropriately on 5 L NC  - Daily CRP: 90.2  - Tested positive:  11/12/21  - Symptom onset:  ?  - Out of Isolation:no  - Vaccinated:  J [employer] 08/21 as per EMR, patient stated he did not get vaccination  - Remdesivir: no  - Decadron: 6 mg 11/12  - Actemra/Baricitinib:actemra x 1 - 11/12  - Co-infection: No  - Fluid Balance:  - Glycemic Control:189  - WBC 2.1     COPD   - resumed home med Symbicort, albuterol nebulizer     History of DVT, bilateral LE swelling   - LE doppler negative for doppler  - continue eliqius due to previous history of DVT     ROSEY  - use cpap at night    Hyperglycemia secondary to steroid use  - started on low dose sliding scale  - hypoglycemia protocol  - POCT glucose checks    DVT ppx : eliquis  GI ppx: not indicated    PT/OT: consulted  Discharge Planning / SW: consulted    Keely Giordano MD  Internal Medicine Resident, PGY-1  [address] [place] [address], [place]  11/12/2021, 11:08 PM   Attending Physician Statement  I have discussed the care of Deryl Smoke, including pertinent history and exam findings,  with the resident. I have seen and examined the patient and the key elements of all parts of the encounter have been performed by me. I agree with the assessment, plan and orders as documented by the resident with additions . Covid pneumonia,acute resp. failure  Treatment plan Discussed with nursing staff in detail , all questions answered . Electronically signed by Emma Hein MD on   11/13/21 at 9:48 AM EST  CC time 30 minutes  Please note that this chart was generated using voice recognition Dragon dictation software. Although every effort was made to ensure the accuracy of this automated transcription, some errors in transcription may have occurred.

## 2021-11-14 VITALS
OXYGEN SATURATION: 95 % | TEMPERATURE: 98.7 F | SYSTOLIC BLOOD PRESSURE: 129 MMHG | BODY MASS INDEX: 39.17 KG/M2 | DIASTOLIC BLOOD PRESSURE: 69 MMHG | HEART RATE: 65 BPM | HEIGHT: 75 IN | RESPIRATION RATE: 20 BRPM | WEIGHT: 315 LBS

## 2021-11-14 LAB
ABSOLUTE EOS #: 0 K/UL (ref 0–0.4)
ABSOLUTE IMMATURE GRANULOCYTE: 0.06 K/UL (ref 0–0.3)
ABSOLUTE LYMPH #: 0.64 K/UL (ref 1–4.8)
ABSOLUTE MONO #: 0.7 K/UL (ref 0.1–0.8)
ANION GAP SERPL CALCULATED.3IONS-SCNC: 12 MMOL/L (ref 9–17)
BASOPHILS # BLD: 0 % (ref 0–2)
BASOPHILS ABSOLUTE: 0 K/UL (ref 0–0.2)
BUN BLDV-MCNC: 16 MG/DL (ref 6–20)
BUN/CREAT BLD: ABNORMAL (ref 9–20)
C-REACTIVE PROTEIN: 23.8 MG/L (ref 0–5)
CALCIUM SERPL-MCNC: 8.6 MG/DL (ref 8.6–10.4)
CHLORIDE BLD-SCNC: 108 MMOL/L (ref 98–107)
CO2: 20 MMOL/L (ref 20–31)
CREAT SERPL-MCNC: 0.76 MG/DL (ref 0.7–1.2)
DIFFERENTIAL TYPE: ABNORMAL
EOSINOPHILS RELATIVE PERCENT: 0 % (ref 1–4)
GFR AFRICAN AMERICAN: >60 ML/MIN
GFR NON-AFRICAN AMERICAN: >60 ML/MIN
GFR SERPL CREATININE-BSD FRML MDRD: ABNORMAL ML/MIN/{1.73_M2}
GFR SERPL CREATININE-BSD FRML MDRD: ABNORMAL ML/MIN/{1.73_M2}
GLUCOSE BLD-MCNC: 135 MG/DL (ref 75–110)
GLUCOSE BLD-MCNC: 144 MG/DL (ref 70–99)
GLUCOSE BLD-MCNC: 153 MG/DL (ref 75–110)
GLUCOSE BLD-MCNC: 234 MG/DL (ref 75–110)
HCT VFR BLD CALC: 45.1 % (ref 40.7–50.3)
HEMOGLOBIN: 14 G/DL (ref 13–17)
IMMATURE GRANULOCYTES: 1 %
LYMPHOCYTES # BLD: 10 % (ref 24–44)
MCH RBC QN AUTO: 29.3 PG (ref 25.2–33.5)
MCHC RBC AUTO-ENTMCNC: 31 G/DL (ref 28.4–34.8)
MCV RBC AUTO: 94.4 FL (ref 82.6–102.9)
MONOCYTES # BLD: 11 % (ref 1–7)
MORPHOLOGY: NORMAL
NRBC AUTOMATED: 0 PER 100 WBC
PDW BLD-RTO: 14.1 % (ref 11.8–14.4)
PLATELET # BLD: 199 K/UL (ref 138–453)
PLATELET ESTIMATE: ABNORMAL
PMV BLD AUTO: 11.1 FL (ref 8.1–13.5)
POTASSIUM SERPL-SCNC: 4.6 MMOL/L (ref 3.7–5.3)
RBC # BLD: 4.78 M/UL (ref 4.21–5.77)
RBC # BLD: ABNORMAL 10*6/UL
SEG NEUTROPHILS: 78 % (ref 36–66)
SEGMENTED NEUTROPHILS ABSOLUTE COUNT: 5 K/UL (ref 1.8–7.7)
SODIUM BLD-SCNC: 140 MMOL/L (ref 135–144)
WBC # BLD: 6.4 K/UL (ref 3.5–11.3)
WBC # BLD: ABNORMAL 10*3/UL

## 2021-11-14 PROCEDURE — APPSS30 APP SPLIT SHARED TIME 16-30 MINUTES: Performed by: NURSE PRACTITIONER

## 2021-11-14 PROCEDURE — 6370000000 HC RX 637 (ALT 250 FOR IP)

## 2021-11-14 PROCEDURE — 6370000000 HC RX 637 (ALT 250 FOR IP): Performed by: STUDENT IN AN ORGANIZED HEALTH CARE EDUCATION/TRAINING PROGRAM

## 2021-11-14 PROCEDURE — 97116 GAIT TRAINING THERAPY: CPT

## 2021-11-14 PROCEDURE — 36415 COLL VENOUS BLD VENIPUNCTURE: CPT

## 2021-11-14 PROCEDURE — 99291 CRITICAL CARE FIRST HOUR: CPT | Performed by: INTERNAL MEDICINE

## 2021-11-14 PROCEDURE — 97162 PT EVAL MOD COMPLEX 30 MIN: CPT

## 2021-11-14 PROCEDURE — 2700000000 HC OXYGEN THERAPY PER DAY

## 2021-11-14 PROCEDURE — 2580000003 HC RX 258: Performed by: STUDENT IN AN ORGANIZED HEALTH CARE EDUCATION/TRAINING PROGRAM

## 2021-11-14 PROCEDURE — 6360000002 HC RX W HCPCS: Performed by: INTERNAL MEDICINE

## 2021-11-14 PROCEDURE — 80048 BASIC METABOLIC PNL TOTAL CA: CPT

## 2021-11-14 PROCEDURE — 86140 C-REACTIVE PROTEIN: CPT

## 2021-11-14 PROCEDURE — 94660 CPAP INITIATION&MGMT: CPT

## 2021-11-14 PROCEDURE — 99232 SBSQ HOSP IP/OBS MODERATE 35: CPT | Performed by: INTERNAL MEDICINE

## 2021-11-14 PROCEDURE — 94640 AIRWAY INHALATION TREATMENT: CPT

## 2021-11-14 PROCEDURE — 85025 COMPLETE CBC W/AUTO DIFF WBC: CPT

## 2021-11-14 PROCEDURE — 82947 ASSAY GLUCOSE BLOOD QUANT: CPT

## 2021-11-14 RX ORDER — OXYCODONE HYDROCHLORIDE AND ACETAMINOPHEN 5; 325 MG/1; MG/1
1 TABLET ORAL EVERY 8 HOURS PRN
Status: DISCONTINUED | OUTPATIENT
Start: 2021-11-14 | End: 2021-11-15 | Stop reason: HOSPADM

## 2021-11-14 RX ORDER — DEXAMETHASONE 6 MG/1
6 TABLET ORAL DAILY
Qty: 8 TABLET | Refills: 0 | Status: SHIPPED | OUTPATIENT
Start: 2021-11-14 | End: 2021-11-22

## 2021-11-14 RX ORDER — DEXAMETHASONE 6 MG/1
6 TABLET ORAL DAILY
Qty: 8 TABLET | Refills: 0 | Status: SHIPPED | OUTPATIENT
Start: 2021-11-14 | End: 2021-11-14 | Stop reason: SDUPTHER

## 2021-11-14 RX ADMIN — ALBUTEROL SULFATE 2 PUFF: 90 AEROSOL, METERED RESPIRATORY (INHALATION) at 15:26

## 2021-11-14 RX ADMIN — BUDESONIDE AND FORMOTEROL FUMARATE DIHYDRATE 2 PUFF: 160; 4.5 AEROSOL RESPIRATORY (INHALATION) at 21:27

## 2021-11-14 RX ADMIN — ALBUTEROL SULFATE 2 PUFF: 90 AEROSOL, METERED RESPIRATORY (INHALATION) at 21:27

## 2021-11-14 RX ADMIN — DESMOPRESSIN ACETATE 40 MG: 0.2 TABLET ORAL at 21:57

## 2021-11-14 RX ADMIN — IPRATROPIUM BROMIDE 2 PUFF: 17 AEROSOL, METERED RESPIRATORY (INHALATION) at 11:41

## 2021-11-14 RX ADMIN — OXYCODONE HYDROCHLORIDE AND ACETAMINOPHEN 1 TABLET: 5; 325 TABLET ORAL at 09:09

## 2021-11-14 RX ADMIN — SODIUM CHLORIDE, PRESERVATIVE FREE 10 ML: 5 INJECTION INTRAVENOUS at 10:57

## 2021-11-14 RX ADMIN — IPRATROPIUM BROMIDE 2 PUFF: 17 AEROSOL, METERED RESPIRATORY (INHALATION) at 21:27

## 2021-11-14 RX ADMIN — APIXABAN 5 MG: 5 TABLET, FILM COATED ORAL at 21:57

## 2021-11-14 RX ADMIN — INSULIN LISPRO 1 UNITS: 100 INJECTION, SOLUTION INTRAVENOUS; SUBCUTANEOUS at 22:45

## 2021-11-14 RX ADMIN — IPRATROPIUM BROMIDE 2 PUFF: 17 AEROSOL, METERED RESPIRATORY (INHALATION) at 15:26

## 2021-11-14 RX ADMIN — DEXAMETHASONE SODIUM PHOSPHATE 6 MG: 10 INJECTION INTRAMUSCULAR; INTRAVENOUS at 17:11

## 2021-11-14 RX ADMIN — ALBUTEROL SULFATE 2 PUFF: 90 AEROSOL, METERED RESPIRATORY (INHALATION) at 11:41

## 2021-11-14 RX ADMIN — ALBUTEROL SULFATE 2 PUFF: 90 AEROSOL, METERED RESPIRATORY (INHALATION) at 08:38

## 2021-11-14 RX ADMIN — BUDESONIDE AND FORMOTEROL FUMARATE DIHYDRATE 2 PUFF: 160; 4.5 AEROSOL RESPIRATORY (INHALATION) at 08:38

## 2021-11-14 RX ADMIN — INSULIN LISPRO 2 UNITS: 100 INJECTION, SOLUTION INTRAVENOUS; SUBCUTANEOUS at 17:12

## 2021-11-14 RX ADMIN — APIXABAN 5 MG: 5 TABLET, FILM COATED ORAL at 07:52

## 2021-11-14 RX ADMIN — IPRATROPIUM BROMIDE 2 PUFF: 17 AEROSOL, METERED RESPIRATORY (INHALATION) at 08:38

## 2021-11-14 RX ADMIN — ACETAMINOPHEN 650 MG: 325 TABLET ORAL at 05:37

## 2021-11-14 RX ADMIN — ZOLPIDEM TARTRATE 10 MG: 5 TABLET ORAL at 22:01

## 2021-11-14 ASSESSMENT — PAIN SCALES - GENERAL
PAINLEVEL_OUTOF10: 7
PAINLEVEL_OUTOF10: 10
PAINLEVEL_OUTOF10: 10

## 2021-11-14 ASSESSMENT — PAIN DESCRIPTION - PAIN TYPE: TYPE: CHRONIC PAIN

## 2021-11-14 ASSESSMENT — ENCOUNTER SYMPTOMS
APNEA: 0
SHORTNESS OF BREATH: 1
COLOR CHANGE: 1
ABDOMINAL DISTENTION: 0
COUGH: 1

## 2021-11-14 ASSESSMENT — PAIN DESCRIPTION - ORIENTATION: ORIENTATION: RIGHT;LEFT

## 2021-11-14 ASSESSMENT — PAIN DESCRIPTION - LOCATION: LOCATION: LEG

## 2021-11-14 ASSESSMENT — PAIN - FUNCTIONAL ASSESSMENT: PAIN_FUNCTIONAL_ASSESSMENT: ACTIVITIES ARE NOT PREVENTED

## 2021-11-14 NOTE — PROGRESS NOTES
CLINICAL PHARMACY NOTE: MEDS TO BEDS    Total # of Prescriptions Filled: 1   The following medications were delivered to the patient:  · Dexamethasone 6mg tablet    Additional Documentation: medications delivered to the RN Tonya Abdi on 11.14.21 at 12:05 as the pt is covid positive.  No co pay

## 2021-11-14 NOTE — PROGRESS NOTES
Home Oxygen Evaluation    Home Oxygen Evaluation completed. Patient is on 3 liters per minute via nasal cannula. Resting SpO2 = 95%  Resting SpO2 on room air = 93%    SpO2 on room air with exercise = 92%  SpO2 on oxygen as above with exercise = NA    Nocturnal Oximetry with patient on room air is recommended is SpO2 is between 89% and 95% (requires additional order).     ARNULFO GUARDADO RCP  11:56 AM

## 2021-11-14 NOTE — PROGRESS NOTES
[facility] -   Infectious diseases evaluation. Progress Note    admission date 11/12/2021    reason for consultation:   Covid    Impression :     Covid, pneumonia  Elevated CRP  COPD exacerbation    Discussion / summary of stay / plan of care   · COPD uses 2 L on and off at home, comes in with a 3-day history of cough and shortness of breath requiring now 5 L of oxygen, chest x-ray read as multifocal small areas of Covid. · CT scan ordered but he could not tolerate and hence was not done  · He also complains of lower extremity severe pain. He has venous stasis dermatitis with some edema. No open ulcers and no cellulitis   · Suspecting Covid pneumonia with COPD exacerbation, I am not sure why symptoms are 21days old unless he took some of symptoms for those of underlying COPD. · In the absence of other causes of CRP elevation, he will benefit from Actemra to treat the Covid pneumonia. Recommendations   · ACTEMRA X 1 11/12  · Decadron 6mg daily  · anticoagulation    Infection Control Recommendations   · [place] Precautions  · Contact Isolation   · Airborne isolation  · Droplet Isolation    Antimicrobial Stewardship Recommendations   Off AB    Coordination ofOutpatient Care:   · Estimated Length of IV antimicrobials:  · Patient will need Midline / picc Catheter Insertion:   · Patient will need SNF:  · Patient will need outpatient wound care:     History of Present Illness:   Initial history:  · Mason Delcid is a 49y.o.-year-old male COPD uses 2 L on and off at home, comes in with a 3-day history of cough and shortness of breath requiring now 5 L of oxygen, chest x-ray read as multifocal small areas of Covid. · CT scan ordered but could not tolerate and hence was not done  He also complains almost lower extremity severe pain that he does have venous stasis dermatitis with some edema.   No open ulcers and no cellulitis   His cough and shortness of breath started about 3 days before admission, he did not have any fever sore throat,  He denies any dysuria. The patient does have a decrease in his appetite. Chest x-ray reviewed, shows small areas of mild fluffy infiltrates  CRP 19 but ferritin is normal, fibrinogen elevated    Interval changes  11/14/2021   Patient Vitals for the past 8 hrs:   BP Temp Pulse Resp SpO2   11/14/21 1141    16 95 %   11/14/21 0800 124/81 98 °F (36.7 °C) 68 16 97 %     11/13  Afebrile  VS stable on 4 L NC    Patient is alert and oriented    CURRENT EVALUATION : 11/14/2021    Afebrile  VS stable    Patient feels better and is going to be discharged home to complete his treatment with Decadron    On nasal oxygen at 3 L/min  Respiratory rate 16  O2 saturation 95    Summary of relevant labs:11/14/2021    Labs:  WBC 5.1-->2.1-->6.4  Hemoglobin 14.0  Platelet 434    Fibrinogen 578 High    D-Dimer, Quant0.48   Procalcitonin0.06      CRP 90.2 --> 23.3  Ferritin 267    Micro:  covid +  Imaging:  Us doppler legs neg  CXR bilat fluffy infil likely covid pneumonia       I have personally reviewed the past medical history, past surgical history, medications, social history, and family history, and I haveupdated the database accordingly. Allergies:   Patient has no known allergies. Review of Systems:     Review of Systems   Constitutional: Positive for activity change and appetite change. HENT: Positive for congestion. Respiratory: Positive for cough and shortness of breath. Negative for apnea. Cardiovascular: Negative for chest pain. Gastrointestinal: Negative for abdominal distention. Endocrine: Negative for cold intolerance. Genitourinary: Negative for dysuria. Musculoskeletal: Negative for arthralgias. Skin: Positive for color change. Allergic/Immunologic: Negative for immunocompromised state. Neurological: Negative for dizziness. Psychiatric/Behavioral: Negative for agitation.        Physical Examination : Physical Exam  Constitutional:       Appearance: Normal appearance. He is obese. He is ill-appearing. HENT:      Head: Normocephalic and atraumatic. Nose: Nose normal.      Mouth/Throat:      Mouth: Mucous membranes are moist.   Eyes:      General: No scleral icterus. Conjunctiva/sclera: Conjunctivae normal.   Cardiovascular:      Rate and Rhythm: Regular rhythm. Heart sounds: No friction rub. Pulmonary:      Breath sounds: No stridor. Abdominal:      Palpations: There is no mass. Tenderness: There is no abdominal tenderness. Hernia: No hernia is present. Genitourinary:     Comments: No manzano  Musculoskeletal:         General: No swelling or deformity. Cervical back: Neck supple. No rigidity. Skin:     Coloration: Skin is not jaundiced. Findings: No bruising. Neurological:      General: No focal deficit present. Mental Status: He is alert and oriented to person, place, and time. Psychiatric:         Mood and Affect: Mood normal.         Thought Content: Thought content normal.         Past Medical History:     Past Medical History:   Diagnosis Date    [facility]     Diabetes mellitus ([facility]     ROSEY on CPAP        Past Surgical  History:   No past surgical history on file.     Medications:      albuterol sulfate HFA  2 puff Inhalation 4x daily    apixaban  5 mg Oral BID    insulin lispro  0-6 Units SubCUTAneous TID WC    insulin lispro  0-3 Units SubCUTAneous Nightly    atorvastatin  40 mg Oral Nightly    budesonide-formoterol  2 puff Inhalation BID    sodium chloride flush  10 mL IntraVENous 2 times per day    dexamethasone  6 mg IntraVENous Q24H    ipratropium  2 puff Inhalation 4x daily       Social History:     Social History     Socioeconomic History    Marital status: Single     Spouse name: Not on file    Number of children: Not on file    Years of education: Not on file    Highest education level: Not on file   Occupational History    Not on file   Tobacco Use    Smoking status: Former Smoker     Packs/day: 1.00     Years: 25.00     Pack years: 25.00     Types: Cigarettes     Quit date: 2021     Years since quittin.8    Smokeless tobacco: Never Used   Vaping Use    Vaping Use: Never used   Substance and Sexual Activity    Alcohol use: Not Currently    Drug use: Not Currently    Sexual activity: Not on file   Other Topics Concern    Not on file   Social History Narrative    Not on file     Social Determinants of Health     Financial Resource Strain:     Difficulty of Paying Living Expenses: Not on file   Food Insecurity:     Worried About Running Out of Food in the Last Year: Not on file    Bony of Food in the Last Year: Not on file   Transportation Needs:     Lack of Transportation (Medical): Not on file    Lack of Transportation (Non-Medical):  Not on file   Physical Activity:     Days of Exercise per Week: Not on file    Minutes of Exercise per Session: Not on file   Stress:     Feeling of Stress : Not on file   Social Connections:     Frequency of Communication with Friends and Family: Not on file    Frequency of Social Gatherings with Friends and Family: Not on file    Attends Protestant Services: Not on file    Active Member of [address] [place] [address] [employer] or Organizations: Not on file    Attends Club or Organization Meetings: Not on file    Marital Status: Not on file   Intimate Partner Violence:     Fear of Current or Ex-Partner: Not on file    Emotionally Abused: Not on file    Physically Abused: Not on file    Sexually Abused: Not on file   Housing Stability:     Unable to Pay for Housing in the Last Year: Not on file    Number of [place] in the Last Year: Not on file    Unstable Housing in the Last Year: Not on file       Family History:     Family History   Problem Relation Age of Onset    Lung Cancer Mother       Medical Decision Making:   I have independently reviewed/ordered the following labs:    CBC with Differential:   Recent Labs     11/13/21  0742 11/14/21  0734   WBC 2.1* 6.4   HGB 14.2 14.0   HCT 45.5 45.1    199   LYMPHOPCT 18* 10*   MONOPCT 9 11*     BMP:  Recent Labs     11/13/21  0742 11/14/21  0734    140   K 4.4 4.6    108*   CO2 20 20   BUN 16 16   CREATININE 0.76 0.76     Hepatic Function Panel: No results for input(s): PROT, LABALBU, BILIDIR, IBILI, BILITOT, ALKPHOS, ALT, AST in the last 72 hours. No results for input(s): RPR in the last 72 hours. No results for input(s): HIV in the last 72 hours. No results for input(s): BC in the last 72 hours. Lab Results   Component Value Date    CREATININE 0.76 11/14/2021    GLUCOSE 144 11/14/2021       Detailed results: Thank you for allowing us to participate in the care of this patient. Please call with questions. This note is created with the assistance of a speech recognition program.  While intending to generate adocument that actually reflects the content of the visit, the document can still have some errors including those of syntax and sound a like substitutions which may escape proof reading. It such instances, actual meaningcan be extrapolated by contextual diversion.     Fitz Ralph MD  Office: [phone]  [employer] / office [phone]

## 2021-11-14 NOTE — CARE COORDINATION
Spoke to pt via telephone to discuss transitional planning. He is returning home. He denies needs for home. He needs transportation. He qualifies for 3L home oxygen. He is current with Radha [facility] at [facility]. Call to [facility]. They are able to deliver portable tank today. Order faxed. Will arrange for transport once tank delivered    1401 received call from [facility]. They received order and will set up delivery for tank to the hospital. She does not have an ETA    1800 call to [facility]. Answering service will have  call with ETA. Transport request sent to [address] [place] [address] to place pt on [employer] call. \" provided Cyn Caleb with  [employer] phone number to call when pt is ready for discharge    Discharge Report    [facility] Case Management Department  Written by: Sheryle Kell, RN    Patient Name: Juliana Ruiz  Attending Provider: Claudell Rhodes, MD  Admit Date: 2021  7:20 AM  MRN: [id number]  Account: [de-identified]                     : 1965  Discharge Date: 2021      Disposition: home    Sheryle Kell, RN

## 2021-11-14 NOTE — PROGRESS NOTES
[facility] -   Infectious diseases evaluation  admission date 11/12/2021    reason for consultation:   Covid    Impression :   Current:  Covid, pneumonia  Elevated CRP  COPD exacerbation    Other:  ·   Discussion / summary of stay / plan of care   · COPD uses 2 L on and off at home, comes in with a 3-day history of cough and shortness of breath requiring now 5 L of oxygen, chest x-ray read as multifocal small areas of Covid. · CT scan ordered but could not tolerate and hence was not done  · He also complains almost lower extremity severe pain that he does have venous stasis dermatitis with some edema. No open ulcers and no cellulitis   · Suspecting Covid pneumonia with COPD exacerbation, I am not sure why symptoms are 22-year days old unless he has noticed some of the symptom days  due to ongoing COPD. · In the absence of other causes of CRP, he will benefit from Actemra to treat the Covid pneumonia. Recommendations   · ACTEMRA X 1 11/12  · Decadron 6mg daily  · anticoagulation    Infection Control Recommendations   · [place] Precautions  · Contact Isolation   · Airborne isolation  · Droplet Isolation    Antimicrobial Stewardship Recommendations   Off AB    Coordination ofOutpatient Care:   · Estimated Length of IV antimicrobials:  · Patient will need Midline / picc Catheter Insertion:   · Patient will need SNF:  · Patient will need outpatient wound care:     History of Present Illness:   Initial history:  · Megan Meeks is a 49y.o.-year-old male COPD uses 2 L on and off at home, comes in with a 3-day history of cough and shortness of breath requiring now 5 L of oxygen, chest x-ray read as multifocal small areas of Covid. · CT scan ordered but could not tolerate and hence was not done  He also complains almost lower extremity severe pain that he does have venous stasis dermatitis with some edema.   No open ulcers and no cellulitis   His cough and shortness of breath started about 3 days before admission, he did not have any fever sore throat,  He denies any dysuria. The patient does have a decrease in his appetite. Chest x-ray reviewed, shows small areas of mild fluffy infiltrates  CRP 19 but ferritin is normal, fibrinogen elevated    Interval changes  11/14/2021   Patient Vitals for the past 8 hrs:   BP Temp Pulse Resp SpO2   11/14/21 1141    16 95 %   11/14/21 0800 124/81 98 °F (36.7 °C) 68 16 97 %   11/14/21 0443    17      11/13  Afebrile  VS stable on 4-->3 L NC    Patient is alert and oriented  CRP 23.8    Home O2 eval ordered     SpO2: 95%  RR: 18    Summary of relevant labs:  Labs:  WBC: 6.4  [id number] High    D-Dimer, Quant0.48   Procalcitonin0.06      CRP90.2-->23.8 High    Ferritin 267    Micro:  covid +  Imaging:  Us doppler legs neg  CXR bilat fluffy infil likely covid pneumonia       I have personally reviewed the past medical history, past surgical history, medications, social history, and family history, and I haveupdated the database accordingly. Allergies:   Patient has no known allergies. Review of Systems:     Review of Systems   Constitutional: Positive for activity change and appetite change. HENT: Positive for congestion. Respiratory: Positive for cough and shortness of breath. Negative for apnea. Cardiovascular: Negative for chest pain. Gastrointestinal: Negative for abdominal distention. Endocrine: Negative for cold intolerance. Genitourinary: Negative for dysuria. Musculoskeletal: Negative for arthralgias. Skin: Positive for color change. Allergic/Immunologic: Negative for immunocompromised state. Neurological: Negative for dizziness. Psychiatric/Behavioral: Negative for agitation. Physical Examination :       Physical Exam  Constitutional:       Appearance: Normal appearance. He is obese. He is ill-appearing. HENT:      Head: Normocephalic and atraumatic.       Nose: Nose normal. Mouth/Throat:      Mouth: Mucous membranes are moist.   Eyes:      General: No scleral icterus. Conjunctiva/sclera: Conjunctivae normal.   Cardiovascular:      Rate and Rhythm: Regular rhythm. Heart sounds: No friction rub. Pulmonary:      Breath sounds: No stridor. Abdominal:      Palpations: There is no mass. Tenderness: There is no abdominal tenderness. Hernia: No hernia is present. Genitourinary:     Comments: No manzano  Musculoskeletal:         General: No swelling or deformity. Cervical back: Neck supple. No rigidity. Skin:     Coloration: Skin is not jaundiced. Findings: No bruising. Neurological:      General: No focal deficit present. Mental Status: He is alert and oriented to person, place, and time. Psychiatric:         Mood and Affect: Mood normal.         Thought Content: Thought content normal.         Past Medical History:     Past Medical History:   Diagnosis Date    [facility] disease) [facility]     Diabetes mellitus ([facility]     ROSEY on CPAP        Past Surgical  History:   No past surgical history on file.     Medications:      albuterol sulfate HFA  2 puff Inhalation 4x daily    apixaban  5 mg Oral BID    insulin lispro  0-6 Units SubCUTAneous TID WC    insulin lispro  0-3 Units SubCUTAneous Nightly    atorvastatin  40 mg Oral Nightly    budesonide-formoterol  2 puff Inhalation BID    sodium chloride flush  10 mL IntraVENous 2 times per day    dexamethasone  6 mg IntraVENous Q24H    ipratropium  2 puff Inhalation 4x daily       Social History:     Social History     Socioeconomic History    Marital status: Single     Spouse name: Not on file    Number of children: Not on file    Years of education: Not on file    Highest education level: Not on file   Occupational History    Not on file   Tobacco Use    Smoking status: Former Smoker     Packs/day: 1.00     Years: 25.00     Pack years: 25.00     Types: Cigarettes Quit date: 2021     Years since quittin.8    Smokeless tobacco: Never Used   Vaping Use    Vaping Use: Never used   Substance and Sexual Activity    Alcohol use: Not Currently    Drug use: Not Currently    Sexual activity: Not on file   Other Topics Concern    Not on file   Social History Narrative    Not on file     Social Determinants of Health     Financial Resource Strain:     Difficulty of Paying Living Expenses: Not on file   Food Insecurity:     Worried About Running Out of Food in the Last Year: Not on file    Bony of Food in the Last Year: Not on file   Transportation Needs:     Lack of Transportation (Medical): Not on file    Lack of Transportation (Non-Medical):  Not on file   Physical Activity:     Days of Exercise per Week: Not on file    Minutes of Exercise per Session: Not on file   Stress:     Feeling of Stress : Not on file   Social Connections:     Frequency of Communication with Friends and Family: Not on file    Frequency of Social Gatherings with Friends and Family: Not on file    Attends Che[employer] Services: Not on file    Active Member of [address] [place] [address] [employer] or Organizations: Not on file    Attends Club or Organization Meetings: Not on file    Marital Status: Not on file   Intimate Partner Violence:     Fear of Current or Ex-Partner: Not on file    Emotionally Abused: Not on file    Physically Abused: Not on file    Sexually Abused: Not on file   Housing Stability:     Unable to Pay for Housing in the Last Year: Not on file    Number of [place] in the Last Year: Not on file    Unstable Housing in the Last Year: Not on file       Family History:     Family History   Problem Relation Age of Onset    Lung Cancer Mother       Medical Decision Making:   I have independently reviewed/ordered the following labs:    CBC with Differential:   Recent Labs     21  0742 21  0734   WBC 2.1* 6.4   HGB 14.2 14.0   HCT 45.5 45.1    199   LYMPHOPCT 18* 10* MONOPCT 9 11*     BMP:  Recent Labs     11/13/21  0742 11/14/21  0734    140   K 4.4 4.6    108*   CO2 20 20   BUN 16 16   CREATININE 0.76 0.76     Hepatic Function Panel: No results for input(s): PROT, LABALBU, BILIDIR, IBILI, BILITOT, ALKPHOS, ALT, AST in the last 72 hours. No results for input(s): RPR in the last 72 hours. No results for input(s): HIV in the last 72 hours. No results for input(s): BC in the last 72 hours. Lab Results   Component Value Date    CREATININE 0.76 11/14/2021    GLUCOSE 144 11/14/2021       Detailed results: Thank you for allowing us to participate in the care of this patient. Please call with questions. This note is created with the assistance of a speech recognition program.  While intending to generate adocument that actually reflects the content of the visit, the document can still have some errors including those of syntax and sound a like substitutions which may escape proof reading. It such instances, actual meaningcan be extrapolated by contextual diversion. Barbra Yost, APRN - CNP     ATTESTATION:    I have discussed the case, including pertinent history and exam findings with the APRN. I have evaluated the  History, physical findings and pictures of the patient and the key elements of the encounter have been performed by me. I have reviewed the laboratory data, other diagnostic studies and discussed them with the APRN. I have updated the medical record where necessary. I agree with the assessment, plan and orders as documented by the APRN.     Claribel Lundberg MD.      Office: [phone]  Perfect serve / office [phone]

## 2021-11-14 NOTE — PROGRESS NOTES
Patient was evaluated today for the diagnosis of acute hypoxic respiratory failure secondary to COVID 19 Pneumonia . I entered a DME order for home oxygen because the diagnosis and testing requires the patient to have supplemental oxygen. Condition will improve or be benefited by oxygen use. The patient is not able to perform good mobility in a home setting and therefore does require the use of a portable oxygen system. The need for this equipment was discussed with the patient and he understands and is in agreement. Attending Physician Statement  I have discussed the care of Juliana Ruiz, including pertinent history and exam findings,  with the resident. I have seen and examined the patient and the key elements of all parts of the encounter have been performed by me. I agree with the assessment, plan and orders as documented by the resident with additions . Treatment plan Discussed with nursing staff in detail , all questions answered . Electronically signed by Claudell Rhodes, MD on   11/14/21 at 1:51 PM EST    Please note that this chart was generated using voice recognition Dragon dictation software. Although every effort was made to ensure the accuracy of this automated transcription, some errors in transcription may have occurred.

## 2021-11-14 NOTE — PLAN OF CARE
Problem: Airway Clearance - Ineffective  Goal: Achieve or maintain patent airway  Outcome: Ongoing     Problem: Gas Exchange - Impaired  Goal: Absence of hypoxia  Outcome: Ongoing  Goal: Promote optimal lung function  Outcome: Ongoing     Problem: Breathing Pattern - Ineffective  Goal: Ability to achieve and maintain a regular respiratory rate  Outcome: Ongoing     Problem:  Body Temperature -  Risk of, Imbalanced  Goal: Ability to maintain a body temperature within defined limits  Outcome: Ongoing  Goal: Will regain or maintain usual level of consciousness  Outcome: Ongoing  Goal: Complications related to the disease process, condition or treatment will be avoided or minimized  Outcome: Ongoing     Problem: Isolation Precautions - Risk of Spread of Infection  Goal: Prevent transmission of infection  Outcome: Ongoing     Problem: Nutrition Deficits  Goal: Optimize nutritional status  Outcome: Ongoing     Problem: Risk for Fluid Volume Deficit  Goal: Maintain normal heart rhythm  Outcome: Ongoing  Goal: Maintain absence of muscle cramping  Outcome: Ongoing  Goal: Maintain normal serum potassium, sodium, calcium, phosphorus, and pH  Outcome: Ongoing     Problem: Loneliness or Risk for Loneliness  Goal: Demonstrate positive use of time alone when socialization is not possible  Outcome: Ongoing     Problem: Fatigue  Goal: Verbalize increase energy and improved vitality  Outcome: Ongoing     Problem: Patient Education: Go to Patient Education Activity  Goal: Patient/Family Education  Outcome: Ongoing     Problem: Skin Integrity:  Goal: Will show no infection signs and symptoms  Description: Will show no infection signs and symptoms  Outcome: Ongoing  Goal: Absence of new skin breakdown  Description: Absence of new skin breakdown  Outcome: Ongoing     Problem: Falls - Risk of:  Goal: Will remain free from falls  Description: Will remain free from falls  Outcome: Ongoing  Goal: Absence of physical injury  Description: Absence of physical injury  Outcome: Ongoing     Problem: Confusion - Acute:  Goal: Absence of continued neurological deterioration signs and symptoms  Description: Absence of continued neurological deterioration signs and symptoms  Outcome: Ongoing  Goal: Mental status will be restored to baseline  Description: Mental status will be restored to baseline  Outcome: Ongoing     Problem: Discharge Planning:  Goal: Ability to perform activities of daily living will improve  Description: Ability to perform activities of daily living will improve  Outcome: Ongoing  Goal: Participates in care planning  Description: Participates in care planning  Outcome: Ongoing     Problem: Injury - Risk of, Physical Injury:  Goal: Will remain free from falls  Description: Will remain free from falls  Outcome: Ongoing  Goal: Absence of physical injury  Description: Absence of physical injury  Outcome: Ongoing     Problem: Mood - Altered:  Goal: Mood stable  Description: Mood stable  Outcome: Ongoing  Goal: Absence of abusive behavior  Description: Absence of abusive behavior  Outcome: Ongoing  Goal: Verbalizations of feeling emotionally comfortable while being cared for will increase  Description: Verbalizations of feeling emotionally comfortable while being cared for will increase  Outcome: Ongoing     Problem: Psychomotor Activity - Altered:  Goal: Absence of psychomotor disturbance signs and symptoms  Description: Absence of psychomotor disturbance signs and symptoms  Outcome: Ongoing     Problem: Sensory Perception - Impaired:  Goal: Demonstrations of improved sensory functioning will increase  Description: Demonstrations of improved sensory functioning will increase  Outcome: Ongoing  Goal: Decrease in sensory misperception frequency  Description: Decrease in sensory misperception frequency  Outcome: Ongoing  Goal: Able to refrain from responding to false sensory perceptions  Description: Able to refrain from responding to false sensory perceptions  Outcome: Ongoing  Goal: Demonstrates accurate environmental perceptions  Description: Demonstrates accurate environmental perceptions  Outcome: Ongoing  Goal: Able to distinguish between reality-based and nonreality-based thinking  Description: Able to distinguish between reality-based and nonreality-based thinking  Outcome: Ongoing  Goal: Able to interrupt nonreality-based thinking  Description: Able to interrupt nonreality-based thinking  Outcome: Ongoing     Problem: Sleep Pattern Disturbance:  Goal: Appears well-rested  Description: Appears well-rested  Outcome: Ongoing

## 2021-11-14 NOTE — PROGRESS NOTES
limits     Subjective  General  Patient assessed for rehabilitation services?: Yes  Response To Previous Treatment: Not applicable  Family / Caregiver Present: No  Follows Commands: Within Functional Limits  Subjective  Subjective: Pt up in a chair and agreeable to therapy, RN agreeable to therapy. Pt pleasant and cooperative throughout today's session. Pain Screening  Patient Currently in Pain: Yes  Pain Assessment  Pain Assessment: 0-10  Pain Level: 10  Pain Type: Chronic pain  Pain Location: Leg  Pain Orientation: Right; Left  Functional Pain Assessment: Activities are not prevented  Non-Pharmaceutical Pain Intervention(s): Ambulation/Increased Activity; Distraction; Repositioned;  Emotional support  Response to Pain Intervention: Patient Satisfied  Vital Signs  Patient Currently in Pain: Yes  Pre Treatment Pain Screening  Intervention List: Patient able to continue with treatment    Social/Functional History  Social/Functional History  Lives With: Family (kathy savage.)  Type of Home: House  Home Layout: One level  Home Access: Stairs to enter without rails  Entrance Stairs - Number of Steps: 2  Bathroom Shower/Tub: Tub/Shower unit  Bathroom Toilet: Standard  Home Equipment: Rolling walker (pt reports using RW at a baseline)  ADL Assistance: [address] [place] [address] Avenue: Independent  Homemaking Responsibilities: No (sister performs [de-identified])  Ambulation Assistance: Independent  Transfer Assistance: Independent  Active : No  Patient's  Info: Sister performs all driving  Occupation: On disability  Leisure & Hobbies: watching TV  Cognition   Cognition  Overall Cognitive Status: WFL    Objective     Observation/Palpation  Posture: Fair    AROM RLE (degrees)  RLE AROM: WFL  AROM LLE (degrees)  LLE AROM : WFL  AROM RUE (degrees)  RUE AROM : WFL  AROM LUE (degrees)  LUE AROM : WFL  Strength RLE  Strength RLE: WFL  Strength LLE  Strength LLE: WFL  Strength RUE  Strength RUE: WFL  Strength LUE  Strength LUE: WFL     Sensation  Overall Sensation Status: WFL (pt denies any numbness/tingling)  Bed mobility  Comment: Pt began today's session up in a chair, retired up to a chair at the conclusion of today's session. Transfers  Sit to Stand: Stand by assistance  Stand to sit: Stand by assistance  Comment: Increased time/effort to perform  Ambulation  Ambulation?: Yes  More Ambulation?: No  Ambulation 1  Surface: level tile  Device: Rolling Walker  Assistance: Stand by assistance  Quality of Gait: decreased gait speed, fair stability. Gait Deviations: Slow Ashley; Decreased step length; Decreased step height  Distance: 30 feet  Stairs/Curb  Stairs?: No     Balance  Posture: Fair  Sitting - Static: Good; -  Sitting - Dynamic: Fair; +  Standing - Static: Fair; +  Standing - Dynamic: Fair  Comments: standing balance assessed with a RW        Plan   Plan  Times per week: 4-5  Times per day: Daily  Current Treatment Recommendations: Strengthening, Transfer Training, Endurance Training, ROM, Gait Training, Balance Training, Functional Mobility Training, Stair training, Safety Education & Training, Home Exercise Program, Equipment Evaluation, Education, & procurement, Patient/Caregiver Education & Training  Safety Devices  Type of devices: Patient at risk for falls, Left in chair, Call light within reach, Gait belt  Restraints  Initially in place: No                                                 AM-PAC Score  AM-PAC Inpatient Mobility Raw Score : 19 (11/14/21 1607)  AM-PAC Inpatient T-Scale Score : 45.44 (11/14/21 1607)  Mobility Inpatient CMS 0-100% Score: 41.77 (11/14/21 1607)  Mobility Inpatient CMS G-Code Modifier : CK (11/14/21 1607)          Goals  Short term goals  Time Frame for Short term goals: 14 visits  Short term goal 1: Pt will ambulate 150 feet with a RW and supervision to increase functional independence.   Short term goal 2: Pt will negotiate 2 stairs without a handrail with SBA to allow the pt to enter prior living arrangements. Short term goal 3: Pt will demonstrate good- standing balance to decrease fall risk. Short term goal 4: Pt will tolerate a 30 minute therapy session to promote increased endurance. Short term goal 5: Pt will perform sit<>stand transfer independently to increase functional independence.   Patient Goals   Patient goals : get better       Therapy Time   Individual Concurrent Group Co-treatment   Time In 8658         Time Out 1538         Minutes 16         Timed Code Treatment Minutes: 8 Minutes       Chucky Frausto, PT

## 2021-11-14 NOTE — DISCHARGE INSTR - COC
Continuity of Care Form    Patient Name: Kaci Taveras   :  1965  MRN:  [id number]    Admit date:  2021  Discharge date:  ***    Code Status Order: Full Code   Advance Directives:      Admitting Physician:  Dalila Lewis MD  PCP: DANIEL Amezcua - CNP    Discharging Nurse: [facility] Unit/Room#:   Discharging Unit Phone Number: ***    Emergency Contact:   Extended Emergency Contact Information  Primary Emergency Contact: Robert Burden, [address] [facility] Phone: [phone]  Mobile Phone: [phone]  Relation: Brother/Sister    Past Surgical History:  No past surgical history on file.     Immunization History:   Immunization History   Administered Date(s) Administered    COVID-19, J&J, PF, 0.5 mL 2021       Active Problems:  Patient Active Problem List   Diagnosis Code    Clotting disorder ([facility] D68.9    Diabetes mellitus type 2, controlled ([facility] E11.9    GERD (gastroesophageal reflux disease) K21.9    Sleep apnea G47.30    COPD exacerbation ([facility] J44.1    History of venous embolism and thrombosis of deep vessels of proximal lower extremity ([facility]) I82.4Y9    COPD (chronic obstructive pulmonary disease) (HCC) J44.9    Anxiety F41.9    Cellulitis L03.90    Long term (current) use of anticoagulants Z79.01    Visual impairment H54.7    Class 3 severe obesity with serious comorbidity in adult ([facility] E66.01    Pneumonia due to COVID-19 virus U07.1, J12.82       Isolation/Infection:   Isolation            Droplet Plus          Patient Infection Status       Infection Onset Added Last Indicated Last Indicated By Review Planned Expiration Resolved Resolved By    COVID-19 21 COVID-19, Rapid 21      Resolved    COVID-19 Rule Out 21 COVID-19, Rapid (Ordered)   21 Rule-Out Test Resulted    COVID-19 Rule Out 06/10/21 06/10/21 06/10/21 Respiratory Panel, Molecular, with COVID-19 (Restricted: peds pts or suitable admitted adults) (Ordered)   06/10/21 Rule-Out Test Resulted            Nurse Assessment:  Last Vital Signs: /81   Pulse 68   Temp 98 °F (36.7 °C)   Resp 16   Ht 6' 3\" (1.905 m)   Wt (!) 350 lb (158.8 kg)   SpO2 97%   BMI 43.75 kg/m²     Last documented pain score (0-10 scale): Pain Level: 10  Last Weight:   Wt Readings from Last 1 Encounters:   21 (!) 350 lb (158.8 kg)     Mental Status:  {IP PT MENTAL STATUS:}    IV Access:  { NANCY IV ACCESS:934236813}    Nursing Mobility/ADLs:  Walking   {CHP DME IGRE:754153019}  Transfer  {CHP DME WWFC:949513088}  Bathing  {CHP DME GNKS:473043603}  Dressing  {CHP DME QDDF:411370340}  Toileting  {CHP DME YQUY:448153761}  Feeding  {P DME SPNS:747315411}  Med Admin  {P DME XPRW:607624698}  Med Delivery   { NANCY MED Delivery:373505842}    Wound Care Documentation and Therapy:        Elimination:  Continence: Bowel: {YES / H}  Bladder: {YES / JN:04698}  Urinary Catheter: {Urinary Catheter:691936047}   Colostomy/Ileostomy/Ileal Conduit: {YES / YZ:00771}       Date of Last BM: ***    Intake/Output Summary (Last 24 hours) at 2021 1125  Last data filed at 2021 0607  Gross per 24 hour   Intake 1200 ml   Output 1700 ml   Net -500 ml     I/O last 3 completed shifts:   In: 1200 [P.O.:1200]  Out: 65 [Urine:1700]    Safety Concerns:     [employer] Safety Concerns:497551752}    Impairments/Disabilities:      [employer] Impairments/Disabilities:272985612}    Nutrition Therapy:  Current Nutrition Therapy:   [employer] Diet List:313121521}    Routes of Feeding: {CHP DME Other Feedings:172659436}  Liquids: {Slp liquid thickness:12740}  Daily Fluid Restriction: {CHP DME Yes amt example:037743797}  Last Modified Barium Swallow with Video (Video Swallowing Test): {Done Not Done Pending sale to [facility]:818143826}    Treatments at the Time of Hospital Discharge:   Respiratory Treatments: ***  Oxygen Therapy:  {Therapy; copd oxygen:06747}  Ventilator:    { CC Vent PUWN:234502305}    Rehab Therapies: {THERAPEUTIC INTERVENTION:7591886224}  Weight Bearing Status/Restrictions: 50Terry Gutierrez CC Weight Bearin}  Other Medical Equipment (for information only, NOT a DME order):  {EQUIPMENT:241363601}  Other Treatments: ***    Patient's personal belongings (please select all that are sent with patient):  {CHP DME Belongings:870599504}    RN SIGNATURE:  {Esignature:820458213}    CASE MANAGEMENT/SOCIAL WORK SECTION    Inpatient Status Date: ***    Readmission Risk Assessment Score:  Readmission Risk              Risk of Unplanned Readmission:  22           Discharging to Facility/ Agency   Name:   Address:  Phone:  Fax:    Dialysis Facility (if applicable)   Name:  Address:  Dialysis Schedule:  Phone:  Fax:    / signature: {Esignature:390335868}    PHYSICIAN SECTION    Prognosis: {Prognosis:0719854381}    Condition at Discharge: 50Terry Gutierrez Patient Condition:715589651}    Rehab Potential (if transferring to Rehab): {Prognosis:3698228578}    Recommended Labs or Other Treatments After Discharge: ***    Physician Certification: I certify the above information and transfer of Eli Caro  is necessary for the continuing treatment of the diagnosis listed and that he requires {Admit to Appropriate Level of Care:98660} for {GREATER/LESS:951355393} 30 days.      Update Admission H&P: {CHP DME Changes in VWU:191387656}    PHYSICIAN SIGNATURE:  {Esignature:644551121}

## 2021-11-14 NOTE — PROGRESS NOTES
[facility]  Internal Medicine Teaching Residency Program  Inpatient Daily Progress Note  ______________________________________________________________________________    Patient: Jayden Madden  YOB: 1965   TLQ:[id number]    Acct: [de-identified]     Room: 2016/2016-01  Admit date: 11/12/2021  Today's date: 11/14/21  Number of days in the hospital: 2    SUBJECTIVE   Admitting Diagnosis: Pneumonia due to COVID-19 virus  CC: SOB, cough  Pt examined at bedside. Chart & results reviewed. Patient hemodynamically stable, afebrile. Patient complaining of bilateral LE pain which is chronic, resumed home percocet   On 3 L NC  Bmp and cbc pending    ROS:  Constitutional:  negative for chills, fevers, sweats  Respiratory:  negative for hemoptysis   Cardiovascular:  negative for chest pain, chest pressure/discomfort, lower extremity edema, palpitations  Gastrointestinal:  negative for abdominal pain, constipation, diarrhea, nausea, vomiting  Neurological:  negative for dizziness, headache  BRIEF HISTORY        The patient is a pleasant 56 y. o. male presents with a chief complaint of shortness of breath from past 2 days. Patient stated he got exposed to covid from his sister and niece who tested positive for covid 19 - 2 days ago. He also has been having associated cough, non productive, fatigue. He also has difficulty taking deep breaths. Patient also has bilateral LE pain that is chronic for him.     Patient stated he does not use home oxygen at home, does use CPAP at home.   He stated he is not vaccinated against covid, however in the EMR it shows as vaccinated.   Lizz Alicia any chest pain, fever.      PMH of COPD, ROSEY on cpap at home     Patient hemodynamically stable, afebrile  /79, RR 20, on 4 L NC     Labs show   BMP wnl  Blood glucose 116  CRP 90.2  Fibrinogen 578  COVID 19 positive  DVT scan negative    OBJECTIVE     Vital Signs:  BP (!) 117/90   Pulse 76 Temp 97.7 °F (36.5 °C) (Axillary)   Resp 17   Ht 6' 3\" (1.905 m)   Wt (!) 350 lb (158.8 kg)   SpO2 100%   BMI 43.75 kg/m²     Temp (24hrs), Av.1 °F (36.7 °C), Min:97.7 °F (36.5 °C), Max:98.4 °F (36.9 °C)    In: 1200   Out: 1700 [Urine:1700]    Physical Exam:  Constitutional: This is a well developed, well nourished, Greater than 40 - Morbid Obesity / Extreme Obesity / Grade III 49y.o. year old male who is alert, oriented, cooperative and in no apparent distress. Head:normocephalic and atraumatic. EENT:  PERRLA. No thrush was noted. Neck: Supple without thyromegaly. No elevated JVP. Respiratory: Chest was symmetrical without dullness to percussion. Breath sounds bilaterally were decreased to auscultation. Cardiovascular: Regular without murmur, clicks, gallops or rubs. Abdomen: Slightly rounded and soft without organomegaly. No rebound, rigidity or guarding was appreciated. Lymphatic: No lymphadenopathy. Extremities:  Bilateral LE edema present and tender to palpation. Muscle size, tone and strength are normal.  No involuntary movements are noted. Skin:  Warm and dry. Good color, turgor and pigmentation.  Bilateral LE venous stasis present, brownish discoloration and drying of skin   Neurological/Psychiatric: The patient's general behavior, level of consciousness, thought content and emotional status is normal.        Medications:  Scheduled Medications:    albuterol sulfate HFA  2 puff Inhalation 4x daily    apixaban  5 mg Oral BID    insulin lispro  0-6 Units SubCUTAneous TID     insulin lispro  0-3 Units SubCUTAneous Nightly    atorvastatin  40 mg Oral Nightly    budesonide-formoterol  2 puff Inhalation BID    sodium chloride flush  10 mL IntraVENous 2 times per day    dexamethasone  6 mg IntraVENous Q24H    ipratropium  2 puff Inhalation 4x daily     Continuous Infusions:    dextrose      sodium chloride       PRN Medicationsglucose, 15 g, PRN  dextrose, 12.5 g, PRN  glucagon (rDNA), 1 mg, PRN  dextrose, 100 mL/hr, PRN  iopamidol, 85 mL, ONCE PRN  zolpidem, 10 mg, Nightly PRN  sodium chloride flush, 10 mL, PRN  sodium chloride, 25 mL, PRN  magnesium sulfate, 1,000 mg, PRN  ondansetron, 4 mg, Q8H PRN   Or  ondansetron, 4 mg, Q6H PRN  magnesium hydroxide, 30 mL, Daily PRN  senna, 5 mL, BID PRN  acetaminophen, 650 mg, Q6H PRN   Or  acetaminophen, 650 mg, Q6H PRN        Diagnostic Labs:  CBC:   Recent Labs     11/12/21  0814 11/13/21  0742   WBC 5.1 2.1*   RBC 4.91 4.84   HGB 14.6 14.2   HCT 46.1 45.5   MCV 93.9 94.0   RDW 14.1 13.8    191     BMP:   Recent Labs     11/12/21  0814 11/13/21  0742    136   K 3.9 4.4    107   CO2 20 20   BUN 13 16   CREATININE 0.95 0.76     BNP: No results for input(s): BNP in the last 72 hours. PT/INR: No results for input(s): PROTIME, INR in the last 72 hours. APTT: No results for input(s): APTT in the last 72 hours. CARDIAC ENZYMES: No results for input(s): CKMB, CKMBINDEX, TROPONINI in the last 72 hours. Invalid input(s): CKTOTAL;3  FASTING LIPID PANEL:No results found for: CHOL, HDL, TRIG  LIVER PROFILE: No results for input(s): AST, ALT, ALB, BILIDIR, BILITOT, ALKPHOS in the last 72 hours. MICROBIOLOGY:   Lab Results   Component Value Date/Time    CULTURE NORMAL RESPIRATORY CARLOS ALBERTO HEAVY GROWTH 06/10/2021 01:48 PM       Imaging:    XR CHEST PORTABLE    Result Date: 11/12/2021  Question mild bilateral patchy bilateral COVID pneumonia. ASSESSMENT & PLAN     ASSESSMENT / PLAN:     Principal Problem:    Pneumonia due to COVID-19 virus  Active Problems:    Diabetes mellitus type 2, controlled (HCC)    GERD (gastroesophageal reflux disease)    Sleep apnea    Class 3 severe obesity with serious comorbidity in adult [facility])  Resolved Problems:    * No resolved hospital problems.  *       COVID 19 pneumonia  - Currently saturating appropriately on 3 L NC  - Daily CRP: 90.2 --> 65.4  - Tested positive:  11/12/21  - Symptom onset:  ?  - Out of Isolation:no  - Vaccinated:  J & J 08/21 as per EMR, patient stated he did not get vaccination  - Remdesivir: no  - Decadron: 6 mg 11/12  - Actemra/Baricitinib:actemra x 1 - 11/12  - Co-infection: No  - Fluid Balance:  - Glycemic Control:187  - WBC 2.1     COPD   - resumed home med Symbicort, albuterol nebulizer     History of DVT, bilateral LE swelling   - LE doppler negative for doppler  - continue eliqius due to previous history of DVT     ROSEY  - use cpap at night     Hyperglycemia secondary to steroid use  - started on low dose sliding scale  - hypoglycemia protocol  - POCT glucose checks       DVT ppx : eliquis  GI ppx: not in    PT/OT: consulted  Discharge Planning / Johnathan Vega: consulted    Hoda Horner MD  Internal Medicine Resident, PGY-1  [facility]; [place], [place]  11/14/2021, 7:32 AM   Attending Physician Statement  I have discussed the care of Shelley Matos, including pertinent history and exam findings,  with the resident. I have seen and examined the patient and the key elements of all parts of the encounter have been performed by me. I agree with the assessment, plan and orders as documented by the resident with additions . COVID resp. failure    Treatment plan Discussed with nursing staff in detail , all questions answered . Electronically signed by Syd Rogers MD on   11/14/21 at 1:51 PM EST  CC30 minutesPlease note that this chart was generated using voice recognition Dragon dictation software. Although every effort was made to ensure the accuracy of this automated transcription, some errors in transcription may have occurred.

## 2021-11-15 ENCOUNTER — CARE COORDINATION (OUTPATIENT)
Dept: CASE MANAGEMENT | Age: 56
End: 2021-11-15

## 2021-11-15 NOTE — PLAN OF CARE
Problem: Airway Clearance - Ineffective  Goal: Achieve or maintain patent airway  Outcome: Ongoing     Problem: Gas Exchange - Impaired  Goal: Absence of hypoxia  Outcome: Ongoing  Goal: Promote optimal lung function  Outcome: Ongoing     Problem: Breathing Pattern - Ineffective  Goal: Ability to achieve and maintain a regular respiratory rate  Outcome: Ongoing     Problem:  Body Temperature -  Risk of, Imbalanced  Goal: Ability to maintain a body temperature within defined limits  Outcome: Ongoing  Goal: Will regain or maintain usual level of consciousness  Outcome: Ongoing  Goal: Complications related to the disease process, condition or treatment will be avoided or minimized  Outcome: Ongoing     Problem: Isolation Precautions - Risk of Spread of Infection  Goal: Prevent transmission of infection  Outcome: Ongoing     Problem: Nutrition Deficits  Goal: Optimize nutritional status  Outcome: Ongoing     Problem: Risk for Fluid Volume Deficit  Goal: Maintain normal heart rhythm  Outcome: Ongoing  Goal: Maintain absence of muscle cramping  Outcome: Ongoing  Goal: Maintain normal serum potassium, sodium, calcium, phosphorus, and pH  Outcome: Ongoing     Problem: Loneliness or Risk for Loneliness  Goal: Demonstrate positive use of time alone when socialization is not possible  Outcome: Ongoing     Problem: Fatigue  Goal: Verbalize increase energy and improved vitality  Outcome: Ongoing     Problem: Patient Education: Go to Patient Education Activity  Goal: Patient/Family Education  Outcome: Ongoing     Problem: Skin Integrity:  Goal: Will show no infection signs and symptoms  Description: Will show no infection signs and symptoms  Outcome: Ongoing  Goal: Absence of new skin breakdown  Description: Absence of new skin breakdown  Outcome: Ongoing     Problem: Falls - Risk of:  Goal: Will remain free from falls  Description: Will remain free from falls  Outcome: Ongoing  Goal: Absence of physical injury  Description: Absence of physical injury  Outcome: Ongoing     Problem: Confusion - Acute:  Goal: Absence of continued neurological deterioration signs and symptoms  Description: Absence of continued neurological deterioration signs and symptoms  Outcome: Ongoing  Goal: Mental status will be restored to baseline  Description: Mental status will be restored to baseline  Outcome: Ongoing     Problem: Discharge Planning:  Goal: Ability to perform activities of daily living will improve  Description: Ability to perform activities of daily living will improve  Outcome: Ongoing  Goal: Participates in care planning  Description: Participates in care planning  Outcome: Ongoing     Problem: Injury - Risk of, Physical Injury:  Goal: Will remain free from falls  Description: Will remain free from falls  Outcome: Ongoing  Goal: Absence of physical injury  Description: Absence of physical injury  Outcome: Ongoing     Problem: Mood - Altered:  Goal: Mood stable  Description: Mood stable  Outcome: Ongoing  Goal: Absence of abusive behavior  Description: Absence of abusive behavior  Outcome: Ongoing  Goal: Verbalizations of feeling emotionally comfortable while being cared for will increase  Description: Verbalizations of feeling emotionally comfortable while being cared for will increase  Outcome: Ongoing     Problem: Psychomotor Activity - Altered:  Goal: Absence of psychomotor disturbance signs and symptoms  Description: Absence of psychomotor disturbance signs and symptoms  Outcome: Ongoing     Problem: Sensory Perception - Impaired:  Goal: Demonstrations of improved sensory functioning will increase  Description: Demonstrations of improved sensory functioning will increase  Outcome: Ongoing  Goal: Decrease in sensory misperception frequency  Description: Decrease in sensory misperception frequency  Outcome: Ongoing  Goal: Able to refrain from responding to false sensory perceptions  Description: Able to refrain from responding to false sensory perceptions  Outcome: Ongoing  Goal: Demonstrates accurate environmental perceptions  Description: Demonstrates accurate environmental perceptions  Outcome: Ongoing  Goal: Able to distinguish between reality-based and nonreality-based thinking  Description: Able to distinguish between reality-based and nonreality-based thinking  Outcome: Ongoing  Goal: Able to interrupt nonreality-based thinking  Description: Able to interrupt nonreality-based thinking  Outcome: Ongoing     Problem: Sleep Pattern Disturbance:  Goal: Appears well-rested  Description: Appears well-rested  Outcome: Ongoing     Problem: Pain:  Goal: Pain level will decrease  Description: Pain level will decrease  Outcome: Ongoing  Goal: Control of acute pain  Description: Control of acute pain  Outcome: Ongoing  Goal: Control of chronic pain  Description: Control of chronic pain  Outcome: Ongoing

## 2021-11-15 NOTE — CARE COORDINATION
[facility] Transitions Initial Follow Up Call#1 -Attempted initial 24 hour transitional call to patient. Left VM to return call directly to CTN      Care Transitions Outreach Attempt - day #1    Call within 2 business days of discharge: Yes   Attempted to reach patient for transitions of care follow up. Unable to reach patient. Patient: Gamal Ya   Patient : 1965   MRN: [id number]    Reason for Admission: COVID PNE, COPD  Discharge Date: 21   RARS: Readmission Risk Score: 16.7 ( )    Last Discharge [facility]       Complaint Diagnosis Description Type Department Provider    21 Concern For COVID-19; Shortness of Breath COPD exacerbation ([facility] [address] . .. ED to Hosp-Admission (Discharged) (ADMITTED) [facility] 2 Jelly Meehan MD; Brady Rivas,... Was this an external facility discharge?  No     Noted following upcoming appointments from discharge chart review:   [facility] follow up appointment(s):   Future Appointments   Date Time Provider Mandy Boyd   2/10/2022  4:00 PM Jamie Desai MD Resp Spec Tiffanie Leiva     [id number] [address] follow up appointment(s): none documented - [facility]    Facility: [facility]    Non-face-to-face services provided:  Obtained and reviewed discharge summary and/or continuity of care documents        Francis Ibarra RN

## 2021-11-16 ENCOUNTER — CARE COORDINATION (OUTPATIENT)
Dept: CASE MANAGEMENT | Age: 56
End: 2021-11-16

## 2021-11-16 NOTE — CARE COORDINATION
[facility] Transitions Initial Follow Up Call #2 -Attempted initial 24 hour transitional call to patient/Iris. Left VM to return call directly to CTN - call attempt day #2 - one contact number listed for both patient & designated     Care Transitions Outreach Attempt - day #2    Call within 2 business days of discharge: Yes   Attempted to reach patient for transitions of care follow up. Unable to reach patient on consecutive attempts. CT episode resolved unless patient returns call to CTN      Patient: Gustabo Webb   Patient : 1965   MRN: [id number]    Reason for Admission: COVID PNE, COPD  Discharge Date: 21   RARS: Readmission Risk Score: 16.7 ( )    Last Discharge [facility]       Complaint Diagnosis Description Type Department Provider    21 Concern For COVID-19; Shortness of Breath COPD exacerbation ([facility] [address] . .. ED to Hosp-Admission (Discharged) (ADMITTED) [facility] CAR 2 Dorian Jackson MD; Krishna Boyer,... Was this an external facility discharge?  No     Noted following upcoming appointments from discharge chart review:   [facility] follow up appointment(s):   Future Appointments   Date Time Provider Mandy Boyd   2/10/2022  4:00 PM Samantha Duncan  [address]: [facility]  Non-face-to-face services provided:  Obtained and reviewed discharge summary and/or continuity of care documents      Follow Up  Future Appointments   Date Time Provider Mandy Boyd   2/10/2022  4:00 PM Samantha Duncan MD Resp Spec Randall Boykin RN

## 2021-11-17 NOTE — DISCHARGE SUMMARY
[address]     Department of Internal Medicine - Staff Internal Medicine Teaching Service    INPATIENT DISCHARGE SUMMARY      Patient Identification:  Eli Caro is a 49 y.o. male. :  1965  MRN: [id number]     Acct: [de-identified]   PCP: DANIEL Evans CNP  Admit Date:  2021  Discharge date and time: 2021 10:30 PM   Attending Provider: Dr. Clarita Kaur Problem Lists:  Principal Problem:    Pneumonia due to COVID-19 virus  Active Problems:    Diabetes mellitus type 2, controlled (HCC)    GERD (gastroesophageal reflux disease)    Sleep apnea    Class 3 severe obesity with serious comorbidity in adult [facility])  Resolved Problems:    * No resolved hospital problems. *      HOSPITAL STAY     Brief Inpatient course:   Eli Caro is a 49 y.o. male who was admitted for the management of Pneumonia due to COVID-19 virus, presented to the emergency department with SOB. Patient was exposed to Covid through his sister and niece who tested positive for Covid. Patient had associated cough and fatigue. Patient also had bilateral lower extremity pain which is chronic for him    During the hospital stay patient was treated for -     1. COVID-19 pneumonia -requiring 3 L on nasal cannula received Decadron 6 mg starting , and received 1 dose of Actemra on     2. Patient had history of DVT, bilateral lower extremity swelling present-continuing Eliquis    3. COPD-resumed home medication Symbicort and albuterol nebulizer    4.  Hyperglycemia secondary to steroid use- treated with low-dose sliding scale      Procedures/ Significant Interventions:        Consults:     Consults:     Final Specialist Recommendations/Findings:   IP CONSULT TO IV TEAM  IP CONSULT TO INTERNAL MEDICINE  IP CONSULT TO INFECTIOUS DISEASES  IP CONSULT TO PHARMACY  IP CONSULT TO CASE MANAGEMENT      Any Hospital Acquired Infections: none    Discharge Functional Status:  stable    DISCHARGE PLAN     Disposition: home    Patient Instructions:   Discharge Medication List as of 11/14/2021  6:19 PM      CONTINUE these medications which have CHANGED    Details   dexamethasone (DECADRON) 6 MG tablet Take 1 tablet by mouth daily for 8 days, Disp-8 tablet, R-0Normal         CONTINUE these medications which have NOT CHANGED    Details   oxybutynin (DITROPAN-XL) 10 MG extended release tablet TAKE 1 TABLET BY MOUTH ONCE DAILYHistorical Med      tamsulosin (FLOMAX) 0.4 MG capsule TAKE 1 CAPSULE BY MOUTH ONCE DAILYHistorical Med      oxyCODONE-acetaminophen (PERCOCET) 5-325 MG per tablet Take 1 tablet by mouth every 8 hours as needed. Historical Med      atorvastatin (LIPITOR) 40 MG tablet Take 1 tablet by mouth nightly, Disp-30 tablet, R-0Normal      furosemide (LASIX) 40 MG tablet Take 1 tablet by mouth 2 times daily, Disp-60 tablet, R-1Normal      albuterol (PROVENTIL) (2.5 MG/3ML) 0.083% nebulizer solution USE 1 VIAL IN NEBULIZER EVERY 6 HOURS AS NEEDED FOR WHEEZING FOR SHORTNESS OF BREATHHistorical Med      apixaban (ELIQUIS) 5 MG TABS tablet Take 1 tablet by mouth twice dailyHistorical Med      budesonide-formoterol (SYMBICORT) 160-4.5 MCG/ACT AERO Inhale 2 puffs into the lungs 2 times dailyHistorical Med      hydrOXYzine (VISTARIL) 25 MG capsule TAKE 1 CAPSULE BY MOUTH EVERY 8 HOURS AS NEEDED FOR ANXIETYHistorical Med      pregabalin (LYRICA) 50 MG capsule Take 50 mg by mouth 3 times daily. Historical Med      traMADol (ULTRAM) 50 MG tablet TAKE 1 TABLET BY MOUTH ONCE DAILY AS NEEDED FOR PAINHistorical Med      zolpidem (AMBIEN) 10 MG tablet Take 10 mg by mouth nightly as needed. Historical Med         STOP taking these medications       predniSONE (DELTASONE) 20 MG tablet Comments:   Reason for Stopping:               Activity: activity as tolerated    Diet: regular diet    Follow-up:    DANIEL Hernandez - CNP  [address] [facility] [address] [facility] [place] [address]  [phone]    Schedule an appointment as soon as possible for a visit in 1 week  hsop HERB saenz      Patient Instructions: Hospital for COVID-19 pneumonia. He received Actemra and was continued on Decadron. You are being discharged home in stable condition. You are advised to use supplemental oxygen at home as advised. Continue take Decadron 6 mg daily for another 8 days. Also continue Eliquis 5 mg twice daily for history of DVT, PCP to determine the duration. Continue to use CPAP nightly for sleep apnea. See your PCP in 1 week. Follow up labs: Follow up imaging:     Sherlyn López MD, MD  Internal Medicine Resident, PGY-1  [facility];  [place], [place]  11/17/2021, 10:58 AM

## 2021-12-27 ENCOUNTER — APPOINTMENT (OUTPATIENT)
Dept: ULTRASOUND IMAGING | Age: 56
End: 2021-12-27
Payer: MEDICAID

## 2021-12-27 ENCOUNTER — HOSPITAL ENCOUNTER (EMERGENCY)
Age: 56
Discharge: HOME | End: 2021-12-27
Attending: EMERGENCY MEDICINE
Payer: MEDICAID

## 2021-12-27 VITALS
RESPIRATION RATE: 24 BRPM | HEIGHT: 75 IN | TEMPERATURE: 98.1 F | OXYGEN SATURATION: 98 % | HEART RATE: 91 BPM | SYSTOLIC BLOOD PRESSURE: 128 MMHG | DIASTOLIC BLOOD PRESSURE: 80 MMHG | WEIGHT: 300 LBS | BODY MASS INDEX: 37.3 KG/M2

## 2021-12-27 DIAGNOSIS — G89.29 CHRONIC PAIN OF LOWER EXTREMITY, BILATERAL: ICD-10-CM

## 2021-12-27 DIAGNOSIS — M79.604 CHRONIC PAIN OF LOWER EXTREMITY, BILATERAL: ICD-10-CM

## 2021-12-27 DIAGNOSIS — R60.0 LOWER EXTREMITY EDEMA: Primary | ICD-10-CM

## 2021-12-27 DIAGNOSIS — M79.605 CHRONIC PAIN OF LOWER EXTREMITY, BILATERAL: ICD-10-CM

## 2021-12-27 LAB
ABSOLUTE EOS #: 0.37 K/UL (ref 0–0.4)
ABSOLUTE IMMATURE GRANULOCYTE: ABNORMAL K/UL (ref 0–0.3)
ABSOLUTE LYMPH #: 1.77 K/UL (ref 1–4.8)
ABSOLUTE MONO #: 0.93 K/UL (ref 0.1–0.8)
ALBUMIN SERPL-MCNC: 3.1 G/DL (ref 3.5–5.2)
ALBUMIN/GLOBULIN RATIO: 0.9 (ref 1–2.5)
ALP BLD-CCNC: 115 U/L (ref 40–129)
ALT SERPL-CCNC: 11 U/L (ref 5–41)
ANION GAP SERPL CALCULATED.3IONS-SCNC: 11 MMOL/L (ref 9–17)
AST SERPL-CCNC: 12 U/L
BASOPHILS # BLD: 1 % (ref 0–2)
BASOPHILS ABSOLUTE: 0.09 K/UL (ref 0–0.2)
BILIRUB SERPL-MCNC: 0.94 MG/DL (ref 0.3–1.2)
BUN BLDV-MCNC: 12 MG/DL (ref 6–20)
BUN/CREAT BLD: ABNORMAL (ref 9–20)
CALCIUM SERPL-MCNC: 9.1 MG/DL (ref 8.6–10.4)
CHLORIDE BLD-SCNC: 104 MMOL/L (ref 98–107)
CO2: 19 MMOL/L (ref 20–31)
CREAT SERPL-MCNC: 0.9 MG/DL (ref 0.7–1.2)
DIFFERENTIAL TYPE: ABNORMAL
EOSINOPHILS RELATIVE PERCENT: 4 % (ref 1–4)
GFR AFRICAN AMERICAN: >60 ML/MIN
GFR NON-AFRICAN AMERICAN: >60 ML/MIN
GFR SERPL CREATININE-BSD FRML MDRD: ABNORMAL ML/MIN/{1.73_M2}
GFR SERPL CREATININE-BSD FRML MDRD: ABNORMAL ML/MIN/{1.73_M2}
GLUCOSE BLD-MCNC: 116 MG/DL (ref 70–99)
HCT VFR BLD CALC: 41.2 % (ref 41–53)
HEMOGLOBIN: 13.9 G/DL (ref 13.5–17.5)
IMMATURE GRANULOCYTES: ABNORMAL %
LYMPHOCYTES # BLD: 19 % (ref 24–44)
MCH RBC QN AUTO: 30.4 PG (ref 26–34)
MCHC RBC AUTO-ENTMCNC: 33.7 G/DL (ref 31–37)
MCV RBC AUTO: 90.3 FL (ref 80–100)
MONOCYTES # BLD: 10 % (ref 1–7)
MORPHOLOGY: ABNORMAL
NRBC AUTOMATED: ABNORMAL PER 100 WBC
PDW BLD-RTO: 18 % (ref 12.5–15.4)
PLATELET # BLD: ABNORMAL K/UL (ref 140–450)
PLATELET ESTIMATE: ABNORMAL
PMV BLD AUTO: ABNORMAL FL (ref 6–12)
POTASSIUM SERPL-SCNC: 4.3 MMOL/L (ref 3.7–5.3)
RBC # BLD: 4.56 M/UL (ref 4.5–5.9)
RBC # BLD: ABNORMAL 10*6/UL
SEG NEUTROPHILS: 66 % (ref 36–66)
SEGMENTED NEUTROPHILS ABSOLUTE COUNT: 6.14 K/UL (ref 1.8–7.7)
SODIUM BLD-SCNC: 134 MMOL/L (ref 135–144)
TOTAL PROTEIN: 6.6 G/DL (ref 6.4–8.3)
WBC # BLD: 9.3 K/UL (ref 3.5–11)
WBC # BLD: ABNORMAL 10*3/UL

## 2021-12-27 PROCEDURE — 6360000002 HC RX W HCPCS: Performed by: EMERGENCY MEDICINE

## 2021-12-27 PROCEDURE — 96372 THER/PROPH/DIAG INJ SC/IM: CPT

## 2021-12-27 PROCEDURE — 99284 EMERGENCY DEPT VISIT MOD MDM: CPT

## 2021-12-27 PROCEDURE — 93971 EXTREMITY STUDY: CPT

## 2021-12-27 PROCEDURE — 6370000000 HC RX 637 (ALT 250 FOR IP): Performed by: EMERGENCY MEDICINE

## 2021-12-27 PROCEDURE — 36415 COLL VENOUS BLD VENIPUNCTURE: CPT

## 2021-12-27 PROCEDURE — 85025 COMPLETE CBC W/AUTO DIFF WBC: CPT

## 2021-12-27 PROCEDURE — 80053 COMPREHEN METABOLIC PANEL: CPT

## 2021-12-27 RX ORDER — ALPRAZOLAM 0.5 MG/1
0.5 TABLET ORAL 2 TIMES DAILY PRN
Qty: 6 TABLET | Refills: 0 | Status: SHIPPED | OUTPATIENT
Start: 2021-12-27 | End: 2021-12-31

## 2021-12-27 RX ORDER — OXYCODONE HYDROCHLORIDE AND ACETAMINOPHEN 5; 325 MG/1; MG/1
1 TABLET ORAL ONCE
Status: COMPLETED | OUTPATIENT
Start: 2021-12-27 | End: 2021-12-27

## 2021-12-27 RX ADMIN — HYDROMORPHONE HYDROCHLORIDE 1 MG: 1 INJECTION, SOLUTION INTRAMUSCULAR; INTRAVENOUS; SUBCUTANEOUS at 13:03

## 2021-12-27 RX ADMIN — OXYCODONE HYDROCHLORIDE AND ACETAMINOPHEN 1 TABLET: 5; 325 TABLET ORAL at 18:13

## 2021-12-27 ASSESSMENT — PAIN SCALES - GENERAL
PAINLEVEL_OUTOF10: 10

## 2021-12-27 ASSESSMENT — ENCOUNTER SYMPTOMS
COLOR CHANGE: 1
SHORTNESS OF BREATH: 0
SORE THROAT: 0
VOMITING: 0
DIARRHEA: 0

## 2021-12-27 NOTE — ED NOTES
Writer spoke with patient and family. Pt and family expresses wishes for patient to be placed in an inpatient rehab facility. Pt's family states that they want him to go to [facility], which doesn't accept his insurance. His family states that they would like him to be admitted. Writer explains to patient and family that per Dr Alvaro Morales, he won't be admitted.   Per Physician office per care navigator, that PT/ OT will come out and do an eval.  Pt has [facility], who will come out for an eval.    Shelby Mendoza, RN  12/27/21 [facility], RN  12/27/21 [id number]

## 2021-12-27 NOTE — ED PROVIDER NOTES
[id number] [facility] ED  [address] [facility] [address]  Phone: [phone]  Fax: [phone] [address] [facility]  [place]      Pt Name: Libertad Maloney  MRN: [id number]  [place] 1965  Date of evaluation: 12/27/2021  Provider: Alexa Tanner, [facility]       Chief Complaint   Patient presents with    Extremity Weakness         HISTORY OF PRESENT ILLNESS   (Location/Symptom, Timing/Onset,Context/Setting, Quality, Duration, Modifying Factors, Severity)  Note limiting factors. Libertad Maloney is a 49 y.o. male who presents to the emergency department for the evaluation of pain bilaterally in his legs. Patient states that this has been an ongoing issue for him, however, it is gotten worse over the past couple of days. He was sitting in his bed today and states he could not handle the pain, he called EMS and they brought him here for evaluation. His chief complaint is written as weakness, however, he does not have weakness in his extremities he has pain in his extremities and he cannot walk on them because of that he states. He is prescribed Percocet at home and he states he has been taking it for pain. He denies any other symptoms. He has no back pain, no fever, no chest pain, no cough, no new numbness or weakness in the arms or legs. He states he has chronic swelling and some redness in both legs    Nursing Notes were reviewed. REVIEW OF SYSTEMS    (2-9systems for level 4, 10 or more for level 5)     Review of Systems   Constitutional: Negative for fever. HENT: Negative for sore throat. Respiratory: Negative for shortness of breath. Cardiovascular: Negative for chest pain. Gastrointestinal: Negative for diarrhea and vomiting. Genitourinary: Negative for dysuria. Musculoskeletal:        Bilateral leg pain   Skin: Positive for color change. Negative for rash. Neurological: Negative for weakness.    All other systems reviewed and are negative. Except asnoted above the remainder of the review of systems was reviewed and negative. PAST MEDICAL HISTORY     Past Medical History:   Diagnosis Date    [facility]chronic obstructive [facility] disease) [facility]     Diabetes mellitus ([facility]     ROSEY on CPAP          SURGICAL HISTORY     History reviewed. No pertinent surgical history. CURRENT MEDICATIONS     Previous Medications    ALBUTEROL (PROVENTIL) (2.5 MG/3ML) 0.083% NEBULIZER SOLUTION    USE 1 VIAL IN NEBULIZER EVERY 6 HOURS AS NEEDED FOR WHEEZING FOR SHORTNESS OF BREATH    APIXABAN (ELIQUIS) 5 MG TABS TABLET    Take 1 tablet by mouth twice daily    ATORVASTATIN (LIPITOR) 40 MG TABLET    Take 1 tablet by mouth nightly    BUDESONIDE-FORMOTEROL (SYMBICORT) 160-4.5 MCG/ACT AERO    Inhale 2 puffs into the lungs 2 times daily    FUROSEMIDE (LASIX) 40 MG TABLET    Take 1 tablet by mouth 2 times daily    HYDROXYZINE (VISTARIL) 25 MG CAPSULE    TAKE 1 CAPSULE BY MOUTH EVERY 8 HOURS AS NEEDED FOR ANXIETY    OXYBUTYNIN (DITROPAN-XL) 10 MG EXTENDED RELEASE TABLET    TAKE 1 TABLET BY MOUTH ONCE DAILY    OXYCODONE-ACETAMINOPHEN (PERCOCET) 5-325 MG PER TABLET    Take 1 tablet by mouth every 8 hours as needed. PREGABALIN (LYRICA) 50 MG CAPSULE    Take 50 mg by mouth 3 times daily. TAMSULOSIN (FLOMAX) 0.4 MG CAPSULE    TAKE 1 CAPSULE BY MOUTH ONCE DAILY    TRAMADOL (ULTRAM) 50 MG TABLET    TAKE 1 TABLET BY MOUTH ONCE DAILY AS NEEDED FOR PAIN    ZOLPIDEM (AMBIEN) 10 MG TABLET    Take 10 mg by mouth nightly as needed. ALLERGIES     Patient has no known allergies.     FAMILY HISTORY       Family History   Problem Relation Age of Onset    Lung Cancer Mother           SOCIAL HISTORY       Social History     Socioeconomic History    Marital status: Single     Spouse name: None    Number of children: None    Years of education: None    Highest education level: None   Occupational History    None   Tobacco Use    Smoking status: Former Smoker     Packs/day: 1.00     Years: 25.00     Pack years: 25.00     Types: Cigarettes     Quit date: 2021     Years since quittin.9    Smokeless tobacco: Never Used   Vaping Use    Vaping Use: Never used   Substance and Sexual Activity    Alcohol use: Not Currently    Drug use: Not Currently    Sexual activity: None   Other Topics Concern    None   Social History Narrative    None     Social Determinants of Health     Financial Resource Strain:     Difficulty of Paying Living Expenses: Not on file   Food Insecurity:     Worried About Running Out of Food in the Last Year: Not on file    Bony of Food in the Last Year: Not on file   Transportation Needs:     Lack of Transportation (Medical): Not on file    Lack of Transportation (Non-Medical):  Not on file   Physical Activity:     Days of Exercise per Week: Not on file    Minutes of Exercise per Session: Not on file   Stress:     Feeling of Stress : Not on file   Social Connections:     Frequency of Communication with Friends and Family: Not on file    Frequency of Social Gatherings with Friends and Family: Not on file    Attends Congregational Services: Not on file    Active Member of [address] [place] [address] [employer] or Organizations: Not on file    Attends Club or Organization Meetings: Not on file    Marital Status: Not on file   Intimate Partner Violence:     Fear of Current or Ex-Partner: Not on file    Emotionally Abused: Not on file    Physically Abused: Not on file    Sexually Abused: Not on file   Housing Stability:     Unable to Pay for Housing in the Last Year: Not on file    Number of [place] in the Last Year: Not on file    Unstable Housing in the Last Year: Not on file       SCREENINGS             PHYSICAL EXAM    (up to 7 for level 4, 8 or more for level 5)     ED Triage Vitals [21 1156]   BP Temp Temp Source Pulse Resp SpO2 Height Weight   128/80 98.1 °F (36.7 °C) Oral 91 24 98 % 6' 3\" (1.905 m) 300 lb (136.1 kg)       Physical Exam  Vitals and nursing note reviewed. Constitutional:       General: He is not in acute distress. Appearance: Normal appearance. He is not ill-appearing or toxic-appearing. HENT:      Head: Normocephalic and atraumatic. Nose: Nose normal. No congestion. Mouth/Throat:      Mouth: Mucous membranes are moist.   Eyes:      General:         Right eye: No discharge. Left eye: No discharge. Conjunctiva/sclera: Conjunctivae normal.   Cardiovascular:      Rate and Rhythm: Normal rate and regular rhythm. Pulses: Normal pulses. Heart sounds: Normal heart sounds. No murmur heard. Pulmonary:      Effort: Pulmonary effort is normal. No respiratory distress. Breath sounds: Normal breath sounds. No wheezing. Abdominal:      General: Abdomen is flat. There is no distension. Palpations: Abdomen is soft. Tenderness: There is no abdominal tenderness. Musculoskeletal:         General: No deformity or signs of injury. Normal range of motion. Cervical back: Normal range of motion. Right lower leg: Edema present. Left lower leg: Edema present. Comments: 1+ edema bilaterally in the lower extremities with some overlying erythema, this is from the ankle to the mid shin, there is no unilateral calf swelling or asymmetry noted   Skin:     General: Skin is warm and dry. Capillary Refill: Capillary refill takes less than 2 seconds. Findings: Erythema present. No rash. Neurological:      General: No focal deficit present. Mental Status: He is alert and oriented to person, place, and time. Mental status is at baseline. Sensory: No sensory deficit. Motor: No weakness. Comments: Speaking normally. No facial asymmetry. Moving all 4 extremities.   Gait testing initially deferred   Psychiatric:         Mood and Affect: Mood normal.         EMERGENCY DEPARTMENT COURSE and DIFFERENTIAL DIAGNOSIS/MDM:   Vitals:    Vitals:    12/27/21 1156 BP: 128/80   Pulse: 91   Resp: 24   Temp: 98.1 °F (36.7 °C)   TempSrc: Oral   SpO2: 98%   Weight: 136.1 kg (300 lb)   Height: 6' 3\" (1.905 m)       Patient presents to the emergency department with the complaints described above. Vital signs were grossly normal, he is nontoxic, resting comfortably in bed, he is neurovascularly intact. At this time, I do believe pain is his primary issue, there is nothing to suggest that this is a neurologic deficit or complaint. He is neurovascularly intact bilaterally in the lower extremities. I did review his chart, I saw back in November he had bilateral lower extremity ultrasounds, I do think we need to repeat this today to rule out DVT, will get some routine blood work, there is no signs of acute decompensated heart failure, will reevaluate. Pain medication ordered. DIAGNOSTIC RESULTS     LABS:  Labs Reviewed   CBC WITH AUTO DIFFERENTIAL - Abnormal; Notable for the following components:       Result Value    RDW 18.0 (*)     Lymphocytes 19 (*)     Monocytes 10 (*)     Absolute Mono # 0.93 (*)     All other components within normal limits   COMPREHENSIVE METABOLIC PANEL - Abnormal; Notable for the following components:    Glucose 116 (*)     Sodium 134 (*)     CO2 19 (*)     Albumin 3.1 (*)     Albumin/Globulin Ratio 0.9 (*)     All other components within normal limits       All other labs were within normal range or not returned as of this dictation. RADIOLOGY:  US DUP LOWER EXTREMITY RIGHT SHIRAZ   Final Result   No evidence of DVT in either visualized lower extremity. US DUP LOWER EXTREMITY LEFT SHIRAZ   Final Result   No evidence of DVT in either visualized lower extremity. ED Course as of 12/27/21 1322   Mon Dec 27, 2021   1311 Laboratory results are grossly unremarkable [TS]   1312 Ultrasound of the lower extremity showed no evidence of DVT.   Patient does have chronic lower extremity edema and is essentially having exacerbation of chronic pain in the lower extremities. He is already on Percocet at home. I have talked to him about elevation of the lower extremities, pain management, will do a few days of gabapentin and have talked about what to return to ER for    At this time the patient is without objective evidence of an acute process requiring hospitalization or inpatient management. They have remained hemodynamically stable and are stable for discharge with outpatient follow-up. Standard anticipatory guidance given to patient upon discharge. Have given them a specific time frame in which to follow-up and who to follow-up with. I have also advised them that they should return to the emergency department if they get worse, or not getting better or develop any new or concerning symptoms. Patient demonstrates understanding. [TS]      ED Course User Index  [TS] Evita Kent, DO     The patient's wife asked about a rehab facility which I stated may be a good idea but they will need to discuss their primary care physician. He does have home health. He has some concern about being able to get back in the house we will try to arrange ambulance transport, she can then help him with activities of daily living, I have asked him to contact the primary care doctor today to start to discuss either further home health or subacute rehab placement. PROCEDURES:  Unless otherwise noted below, none     Procedures    FINAL IMPRESSION      1. Lower extremity edema    2. Chronic pain of lower extremity, bilateral          DISPOSITION/PLAN   DISPOSITION Decision To Discharge 12/27/2021 01:13:17 PM      PATIENT REFERRED TO:  DANIEL Babin - CNP  [facility], [id number] [address]  [phone]    In 3 days        DISCHARGE MEDICATIONS:  New Prescriptions    ALPRAZOLAM (XANAX) 0.5 MG TABLET    Take 1 tablet by mouth 2 times daily as needed for Sleep for up to 4 days.           (Please note that portions of this note were completed with a voice recognition program.  Efforts were made to edit the dictations but occasionally words are mis-transcribed.)    Romy Riley DO (electronically signed)  Board Certified Emergency Physician         Romy Riley DO  12/27/21 3277

## 2021-12-27 NOTE — ED NOTES
Writer updated patient and family on plan of care and to contact [address].       Susie Gee RN  12/27/21 7122

## 2025-04-04 ENCOUNTER — HOSPITAL ENCOUNTER (OUTPATIENT)
Dept: MEDSURG UNIT | Age: 60
Discharge: HOME | End: 2025-04-04
Attending: INTERNAL MEDICINE | Admitting: INTERNAL MEDICINE

## 2025-04-05 LAB
ALBUMIN SERPL-MCNC: 2.5 G/DL (ref 3.5–5.2)
ALBUMIN/GLOB SERPL: 0.7 {RATIO} (ref 1–2.5)
ALP SERPL-CCNC: 116 U/L (ref 40–129)
ALT SERPL-CCNC: 22 U/L (ref 10–50)
ANION GAP SERPL CALCULATED.3IONS-SCNC: 13 MMOL/L (ref 9–16)
AST SERPL-CCNC: 43 U/L (ref 10–50)
BASOPHILS # BLD: 0.07 K/UL (ref 0–0.2)
BASOPHILS NFR BLD: 1 % (ref 0–2)
BILIRUB SERPL-MCNC: 0.3 MG/DL (ref 0–1.2)
BUN SERPL-MCNC: 12 MG/DL (ref 6–20)
CALCIUM SERPL-MCNC: 8.3 MG/DL (ref 8.6–10.4)
CHLORIDE SERPL-SCNC: 97 MMOL/L (ref 98–107)
CO2 SERPL-SCNC: 29 MMOL/L (ref 20–31)
CREAT SERPL-MCNC: 1 MG/DL (ref 0.7–1.2)
EOSINOPHIL # BLD: 0.32 K/UL (ref 0–0.44)
EOSINOPHILS RELATIVE PERCENT: 3 % (ref 1–4)
ERYTHROCYTE [DISTWIDTH] IN BLOOD BY AUTOMATED COUNT: 16.5 % (ref 11.8–14.4)
GFR, ESTIMATED: >90 ML/MIN/1.73M2
GLUCOSE SERPL-MCNC: 86 MG/DL (ref 74–99)
HCT VFR BLD AUTO: 32.4 % (ref 40.7–50.3)
HGB BLD-MCNC: 10.5 G/DL (ref 13–17)
IMM GRANULOCYTES # BLD AUTO: 0.39 K/UL (ref 0–0.3)
IMM GRANULOCYTES NFR BLD: 4 %
LYMPHOCYTES NFR BLD: 2.3 K/UL (ref 1.1–3.7)
LYMPHOCYTES RELATIVE PERCENT: 24 % (ref 24–43)
MAGNESIUM SERPL-MCNC: 1.7 MG/DL (ref 1.6–2.6)
MCH RBC QN AUTO: 30.7 PG (ref 25.2–33.5)
MCHC RBC AUTO-ENTMCNC: 32.4 G/DL (ref 28.4–34.8)
MCV RBC AUTO: 94.7 FL (ref 82.6–102.9)
MONOCYTES NFR BLD: 0.97 K/UL (ref 0.1–1.2)
MONOCYTES NFR BLD: 10 % (ref 3–12)
NEUTROPHILS NFR BLD: 58 % (ref 36–65)
NEUTS SEG NFR BLD: 5.47 K/UL (ref 1.5–8.1)
NRBC BLD-RTO: 0 PER 100 WBC
PHOSPHATE SERPL-MCNC: 2.8 MG/DL (ref 2.5–4.5)
PLATELET # BLD AUTO: 243 K/UL (ref 138–453)
PMV BLD AUTO: 11.5 FL (ref 8.1–13.5)
POTASSIUM SERPL-SCNC: 3.2 MMOL/L (ref 3.7–5.3)
PROT SERPL-MCNC: 6.1 G/DL (ref 6.6–8.7)
RBC # BLD AUTO: 3.42 M/UL (ref 4.21–5.77)
RBC # BLD: ABNORMAL 10*6/UL
SODIUM SERPL-SCNC: 139 MMOL/L (ref 136–145)
WBC OTHER # BLD: 9.5 K/UL (ref 3.5–11.3)

## 2025-04-05 PROCEDURE — 83735 ASSAY OF MAGNESIUM: CPT

## 2025-04-05 PROCEDURE — 84100 ASSAY OF PHOSPHORUS: CPT

## 2025-04-05 PROCEDURE — 85025 COMPLETE CBC W/AUTO DIFF WBC: CPT

## 2025-04-05 PROCEDURE — 80053 COMPREHEN METABOLIC PANEL: CPT

## 2025-04-08 LAB
MAGNESIUM SERPL-MCNC: 2 MG/DL (ref 1.6–2.6)
POTASSIUM SERPL-SCNC: 3.1 MMOL/L (ref 3.7–5.3)

## 2025-04-09 LAB — POTASSIUM SERPL-SCNC: 3.2 MMOL/L (ref 3.7–5.3)

## 2025-04-10 LAB — POTASSIUM SERPL-SCNC: 4 MMOL/L (ref 3.7–5.3)

## 2025-04-11 LAB
ANION GAP SERPL CALCULATED.3IONS-SCNC: 12 MMOL/L (ref 9–16)
BUN SERPL-MCNC: 20 MG/DL (ref 6–20)
CALCIUM SERPL-MCNC: 9.5 MG/DL (ref 8.6–10.4)
CHLORIDE SERPL-SCNC: 96 MMOL/L (ref 98–107)
CO2 SERPL-SCNC: 31 MMOL/L (ref 20–31)
CREAT SERPL-MCNC: 1.1 MG/DL (ref 0.7–1.2)
GFR, ESTIMATED: 75 ML/MIN/1.73M2
GLUCOSE SERPL-MCNC: 120 MG/DL (ref 74–99)
POTASSIUM SERPL-SCNC: 3.7 MMOL/L (ref 3.7–5.3)
SODIUM SERPL-SCNC: 139 MMOL/L (ref 136–145)

## 2025-04-14 LAB
ANION GAP SERPL CALCULATED.3IONS-SCNC: 11 MMOL/L (ref 9–16)
BUN SERPL-MCNC: 18 MG/DL (ref 6–20)
CALCIUM SERPL-MCNC: 9.2 MG/DL (ref 8.6–10.4)
CHLORIDE SERPL-SCNC: 96 MMOL/L (ref 98–107)
CO2 SERPL-SCNC: 33 MMOL/L (ref 20–31)
CREAT SERPL-MCNC: 1.3 MG/DL (ref 0.7–1.2)
ERYTHROCYTE [DISTWIDTH] IN BLOOD BY AUTOMATED COUNT: 17.3 % (ref 11.8–14.4)
GFR, ESTIMATED: 64 ML/MIN/1.73M2
GLUCOSE SERPL-MCNC: 122 MG/DL (ref 74–99)
HCT VFR BLD AUTO: 30 % (ref 40.7–50.3)
HGB BLD-MCNC: 9.7 G/DL (ref 13–17)
MCH RBC QN AUTO: 30.9 PG (ref 25.2–33.5)
MCHC RBC AUTO-ENTMCNC: 32.3 G/DL (ref 28.4–34.8)
MCV RBC AUTO: 95.5 FL (ref 82.6–102.9)
NRBC BLD-RTO: 0 PER 100 WBC
PLATELET # BLD AUTO: 202 K/UL (ref 138–453)
PMV BLD AUTO: 10.8 FL (ref 8.1–13.5)
POTASSIUM SERPL-SCNC: 3.2 MMOL/L (ref 3.7–5.3)
RBC # BLD AUTO: 3.14 M/UL (ref 4.21–5.77)
SODIUM SERPL-SCNC: 140 MMOL/L (ref 136–145)
WBC OTHER # BLD: 9.3 K/UL (ref 3.5–11.3)

## 2025-04-15 LAB — POTASSIUM SERPL-SCNC: 3.4 MMOL/L (ref 3.7–5.3)

## 2025-04-21 LAB
ANION GAP SERPL CALCULATED.3IONS-SCNC: 11 MMOL/L (ref 9–16)
BUN SERPL-MCNC: 12 MG/DL (ref 6–20)
CALCIUM SERPL-MCNC: 9.1 MG/DL (ref 8.6–10.4)
CHLORIDE SERPL-SCNC: 98 MMOL/L (ref 98–107)
CO2 SERPL-SCNC: 31 MMOL/L (ref 20–31)
CREAT SERPL-MCNC: 1 MG/DL (ref 0.7–1.2)
ERYTHROCYTE [DISTWIDTH] IN BLOOD BY AUTOMATED COUNT: 17.6 % (ref 11.8–14.4)
GFR, ESTIMATED: >90 ML/MIN/1.73M2
GLUCOSE SERPL-MCNC: 99 MG/DL (ref 74–99)
HCT VFR BLD AUTO: 31.2 % (ref 40.7–50.3)
HGB BLD-MCNC: 10.1 G/DL (ref 13–17)
MCH RBC QN AUTO: 31.3 PG (ref 25.2–33.5)
MCHC RBC AUTO-ENTMCNC: 32.4 G/DL (ref 28.4–34.8)
MCV RBC AUTO: 96.6 FL (ref 82.6–102.9)
NRBC BLD-RTO: 0 PER 100 WBC
PLATELET # BLD AUTO: 196 K/UL (ref 138–453)
PMV BLD AUTO: 11.4 FL (ref 8.1–13.5)
POTASSIUM SERPL-SCNC: 3.6 MMOL/L (ref 3.7–5.3)
RBC # BLD AUTO: 3.23 M/UL (ref 4.21–5.77)
SODIUM SERPL-SCNC: 141 MMOL/L (ref 136–145)
WBC OTHER # BLD: 7.2 K/UL (ref 3.5–11.3)

## 2025-04-22 LAB — POTASSIUM SERPL-SCNC: 3.7 MMOL/L (ref 3.7–5.3)

## 2025-04-28 LAB
ANION GAP SERPL CALCULATED.3IONS-SCNC: 9 MMOL/L (ref 9–16)
BUN SERPL-MCNC: 14 MG/DL (ref 6–20)
CALCIUM SERPL-MCNC: 8.9 MG/DL (ref 8.6–10.4)
CHLORIDE SERPL-SCNC: 100 MMOL/L (ref 98–107)
CO2 SERPL-SCNC: 31 MMOL/L (ref 20–31)
CREAT SERPL-MCNC: 1 MG/DL (ref 0.7–1.2)
ERYTHROCYTE [DISTWIDTH] IN BLOOD BY AUTOMATED COUNT: 18.2 % (ref 11.8–14.4)
GFR, ESTIMATED: 89 ML/MIN/1.73M2
GLUCOSE SERPL-MCNC: 130 MG/DL (ref 74–99)
HCT VFR BLD AUTO: 29.4 % (ref 40.7–50.3)
HGB BLD-MCNC: 9.4 G/DL (ref 13–17)
MCH RBC QN AUTO: 31.4 PG (ref 25.2–33.5)
MCHC RBC AUTO-ENTMCNC: 32 G/DL (ref 28.4–34.8)
MCV RBC AUTO: 98.3 FL (ref 82.6–102.9)
NRBC BLD-RTO: 0 PER 100 WBC
PLATELET # BLD AUTO: 193 K/UL (ref 138–453)
PMV BLD AUTO: 11.7 FL (ref 8.1–13.5)
POTASSIUM SERPL-SCNC: 3.7 MMOL/L (ref 3.7–5.3)
RBC # BLD AUTO: 2.99 M/UL (ref 4.21–5.77)
SODIUM SERPL-SCNC: 141 MMOL/L (ref 136–145)
WBC OTHER # BLD: 8.3 K/UL (ref 3.5–11.3)

## 2025-05-05 LAB
ANION GAP SERPL CALCULATED.3IONS-SCNC: 10 MMOL/L (ref 9–16)
BUN SERPL-MCNC: 17 MG/DL (ref 6–20)
CALCIUM SERPL-MCNC: 9 MG/DL (ref 8.6–10.4)
CHLORIDE SERPL-SCNC: 103 MMOL/L (ref 98–107)
CO2 SERPL-SCNC: 31 MMOL/L (ref 20–31)
CREAT SERPL-MCNC: 1 MG/DL (ref 0.7–1.2)
ERYTHROCYTE [DISTWIDTH] IN BLOOD BY AUTOMATED COUNT: 17.8 % (ref 11.8–14.4)
GFR, ESTIMATED: 87 ML/MIN/1.73M2
GLUCOSE SERPL-MCNC: 108 MG/DL (ref 74–99)
HCT VFR BLD AUTO: 30.9 % (ref 40.7–50.3)
HGB BLD-MCNC: 9.9 G/DL (ref 13–17)
MCH RBC QN AUTO: 31.8 PG (ref 25.2–33.5)
MCHC RBC AUTO-ENTMCNC: 32 G/DL (ref 28.4–34.8)
MCV RBC AUTO: 99.4 FL (ref 82.6–102.9)
NRBC BLD-RTO: 0 PER 100 WBC
PLATELET # BLD AUTO: 182 K/UL (ref 138–453)
PMV BLD AUTO: 11.4 FL (ref 8.1–13.5)
POTASSIUM SERPL-SCNC: 3.7 MMOL/L (ref 3.7–5.3)
RBC # BLD AUTO: 3.11 M/UL (ref 4.21–5.77)
SODIUM SERPL-SCNC: 143 MMOL/L (ref 136–145)
WBC OTHER # BLD: 9.4 K/UL (ref 3.5–11.3)